# Patient Record
Sex: FEMALE | Race: WHITE | NOT HISPANIC OR LATINO | Employment: OTHER | URBAN - METROPOLITAN AREA
[De-identification: names, ages, dates, MRNs, and addresses within clinical notes are randomized per-mention and may not be internally consistent; named-entity substitution may affect disease eponyms.]

---

## 2020-11-24 ENCOUNTER — OFFICE VISIT (OUTPATIENT)
Dept: FAMILY MEDICINE CLINIC | Facility: CLINIC | Age: 80
End: 2020-11-24
Payer: COMMERCIAL

## 2020-11-24 VITALS
HEART RATE: 55 BPM | RESPIRATION RATE: 16 BRPM | SYSTOLIC BLOOD PRESSURE: 152 MMHG | HEIGHT: 60 IN | WEIGHT: 101 LBS | OXYGEN SATURATION: 96 % | BODY MASS INDEX: 19.83 KG/M2 | DIASTOLIC BLOOD PRESSURE: 64 MMHG | TEMPERATURE: 97.5 F

## 2020-11-24 DIAGNOSIS — E78.5 HYPERLIPIDEMIA, UNSPECIFIED HYPERLIPIDEMIA TYPE: ICD-10-CM

## 2020-11-24 DIAGNOSIS — R53.83 FATIGUE, UNSPECIFIED TYPE: ICD-10-CM

## 2020-11-24 DIAGNOSIS — I25.119 CORONARY ARTERY DISEASE INVOLVING NATIVE CORONARY ARTERY OF NATIVE HEART WITH ANGINA PECTORIS (HCC): ICD-10-CM

## 2020-11-24 DIAGNOSIS — B37.9 CANDIDIASIS: ICD-10-CM

## 2020-11-24 DIAGNOSIS — R25.1 TREMOR: Primary | ICD-10-CM

## 2020-11-24 DIAGNOSIS — Z78.0 POSTMENOPAUSAL: ICD-10-CM

## 2020-11-24 DIAGNOSIS — I10 ESSENTIAL HYPERTENSION: ICD-10-CM

## 2020-11-24 DIAGNOSIS — I77.9 BILATERAL CAROTID ARTERY DISEASE, UNSPECIFIED TYPE (HCC): ICD-10-CM

## 2020-11-24 PROCEDURE — 1036F TOBACCO NON-USER: CPT | Performed by: INTERNAL MEDICINE

## 2020-11-24 PROCEDURE — 3078F DIAST BP <80 MM HG: CPT | Performed by: INTERNAL MEDICINE

## 2020-11-24 PROCEDURE — 99203 OFFICE O/P NEW LOW 30 MIN: CPT | Performed by: INTERNAL MEDICINE

## 2020-11-24 PROCEDURE — G0439 PPPS, SUBSEQ VISIT: HCPCS | Performed by: INTERNAL MEDICINE

## 2020-11-24 PROCEDURE — 1170F FXNL STATUS ASSESSED: CPT | Performed by: INTERNAL MEDICINE

## 2020-11-24 PROCEDURE — 1160F RVW MEDS BY RX/DR IN RCRD: CPT | Performed by: INTERNAL MEDICINE

## 2020-11-24 PROCEDURE — 3725F SCREEN DEPRESSION PERFORMED: CPT | Performed by: INTERNAL MEDICINE

## 2020-11-24 PROCEDURE — 1125F AMNT PAIN NOTED PAIN PRSNT: CPT | Performed by: INTERNAL MEDICINE

## 2020-11-24 PROCEDURE — 3077F SYST BP >= 140 MM HG: CPT | Performed by: INTERNAL MEDICINE

## 2020-11-24 RX ORDER — ATORVASTATIN CALCIUM 40 MG/1
40 TABLET, FILM COATED ORAL DAILY
Qty: 90 TABLET | Refills: 1 | Status: SHIPPED | OUTPATIENT
Start: 2020-11-24 | End: 2021-05-02

## 2020-11-24 RX ORDER — LOSARTAN POTASSIUM 50 MG/1
TABLET ORAL
COMMUNITY
Start: 2020-08-19 | End: 2020-11-24 | Stop reason: SDUPTHER

## 2020-11-24 RX ORDER — ATORVASTATIN CALCIUM 40 MG/1
40 TABLET, FILM COATED ORAL DAILY
COMMUNITY
End: 2020-11-24 | Stop reason: SDUPTHER

## 2020-11-24 RX ORDER — AMLODIPINE BESYLATE 5 MG/1
5 TABLET ORAL 2 TIMES DAILY
COMMUNITY
End: 2020-11-24 | Stop reason: SDUPTHER

## 2020-11-24 RX ORDER — PROPRANOLOL HYDROCHLORIDE 40 MG/1
40 TABLET ORAL 2 TIMES DAILY
Qty: 180 TABLET | Refills: 1 | Status: SHIPPED | OUTPATIENT
Start: 2020-11-24 | End: 2021-08-03

## 2020-11-24 RX ORDER — LORAZEPAM 0.5 MG/1
0.25 TABLET ORAL 2 TIMES DAILY
Qty: 10 TABLET | Refills: 1 | Status: SHIPPED | OUTPATIENT
Start: 2020-11-24 | End: 2022-01-19

## 2020-11-24 RX ORDER — AMLODIPINE BESYLATE 5 MG/1
5 TABLET ORAL 2 TIMES DAILY
Qty: 180 TABLET | Refills: 1 | Status: SHIPPED | OUTPATIENT
Start: 2020-11-24 | End: 2021-05-02

## 2020-11-24 RX ORDER — PROPRANOLOL HYDROCHLORIDE 40 MG/1
TABLET ORAL
COMMUNITY
Start: 2020-11-04 | End: 2020-11-24 | Stop reason: SDUPTHER

## 2020-11-24 RX ORDER — LOSARTAN POTASSIUM 50 MG/1
50 TABLET ORAL DAILY
Qty: 90 TABLET | Refills: 1 | Status: SHIPPED | OUTPATIENT
Start: 2020-11-24 | End: 2020-12-24 | Stop reason: SDUPTHER

## 2020-12-21 ENCOUNTER — LAB (OUTPATIENT)
Dept: LAB | Facility: MEDICAL CENTER | Age: 80
End: 2020-12-21
Payer: COMMERCIAL

## 2020-12-21 ENCOUNTER — HOSPITAL ENCOUNTER (OUTPATIENT)
Dept: RADIOLOGY | Facility: MEDICAL CENTER | Age: 80
Discharge: HOME | End: 2020-12-21
Payer: COMMERCIAL

## 2020-12-21 DIAGNOSIS — E78.5 HYPERLIPIDEMIA, UNSPECIFIED HYPERLIPIDEMIA TYPE: ICD-10-CM

## 2020-12-21 DIAGNOSIS — Z78.0 POSTMENOPAUSAL: ICD-10-CM

## 2020-12-21 DIAGNOSIS — R53.83 FATIGUE, UNSPECIFIED TYPE: ICD-10-CM

## 2020-12-21 DIAGNOSIS — Z12.31 VISIT FOR SCREENING MAMMOGRAM: ICD-10-CM

## 2020-12-21 DIAGNOSIS — I10 ESSENTIAL HYPERTENSION: ICD-10-CM

## 2020-12-21 LAB
ALBUMIN SERPL BCP-MCNC: 4 G/DL (ref 3.5–5)
ALP SERPL-CCNC: 81 U/L (ref 46–116)
ALT SERPL W P-5'-P-CCNC: 34 U/L (ref 12–78)
ANION GAP SERPL CALCULATED.3IONS-SCNC: 6 MMOL/L (ref 4–13)
AST SERPL W P-5'-P-CCNC: 34 U/L (ref 5–45)
BILIRUB SERPL-MCNC: 0.52 MG/DL (ref 0.2–1)
BUN SERPL-MCNC: 17 MG/DL (ref 5–25)
CALCIUM SERPL-MCNC: 10.2 MG/DL (ref 8.3–10.1)
CHLORIDE SERPL-SCNC: 102 MMOL/L (ref 100–108)
CHOLEST SERPL-MCNC: 172 MG/DL (ref 50–200)
CO2 SERPL-SCNC: 31 MMOL/L (ref 21–32)
CREAT SERPL-MCNC: 1.13 MG/DL (ref 0.6–1.3)
ERYTHROCYTE [DISTWIDTH] IN BLOOD BY AUTOMATED COUNT: 13.4 % (ref 11.6–15.1)
GFR SERPL CREATININE-BSD FRML MDRD: 46 ML/MIN/1.73SQ M
GLUCOSE P FAST SERPL-MCNC: 90 MG/DL (ref 65–99)
HCT VFR BLD AUTO: 44.5 % (ref 34.8–46.1)
HDLC SERPL-MCNC: 48 MG/DL
HGB BLD-MCNC: 14 G/DL (ref 11.5–15.4)
LDLC SERPL CALC-MCNC: 94 MG/DL (ref 0–100)
MCH RBC QN AUTO: 30.8 PG (ref 26.8–34.3)
MCHC RBC AUTO-ENTMCNC: 31.5 G/DL (ref 31.4–37.4)
MCV RBC AUTO: 98 FL (ref 82–98)
NONHDLC SERPL-MCNC: 124 MG/DL
PLATELET # BLD AUTO: 216 THOUSANDS/UL (ref 149–390)
PMV BLD AUTO: 9.8 FL (ref 8.9–12.7)
POTASSIUM SERPL-SCNC: 4 MMOL/L (ref 3.5–5.3)
PROT SERPL-MCNC: 7.6 G/DL (ref 6.4–8.2)
RBC # BLD AUTO: 4.55 MILLION/UL (ref 3.81–5.12)
SODIUM SERPL-SCNC: 139 MMOL/L (ref 136–145)
TRIGL SERPL-MCNC: 149 MG/DL
TSH SERPL DL<=0.05 MIU/L-ACNC: 0.87 UIU/ML (ref 0.36–3.74)
WBC # BLD AUTO: 4.05 THOUSAND/UL (ref 4.31–10.16)

## 2020-12-21 PROCEDURE — 85027 COMPLETE CBC AUTOMATED: CPT

## 2020-12-21 PROCEDURE — 77080 DXA BONE DENSITY AXIAL: CPT

## 2020-12-21 PROCEDURE — 80061 LIPID PANEL: CPT

## 2020-12-21 PROCEDURE — 36415 COLL VENOUS BLD VENIPUNCTURE: CPT

## 2020-12-21 PROCEDURE — 84443 ASSAY THYROID STIM HORMONE: CPT

## 2020-12-21 PROCEDURE — 80053 COMPREHEN METABOLIC PANEL: CPT

## 2020-12-24 ENCOUNTER — OFFICE VISIT (OUTPATIENT)
Dept: FAMILY MEDICINE CLINIC | Facility: CLINIC | Age: 80
End: 2020-12-24
Payer: COMMERCIAL

## 2020-12-24 VITALS
HEIGHT: 60 IN | SYSTOLIC BLOOD PRESSURE: 148 MMHG | HEART RATE: 66 BPM | OXYGEN SATURATION: 96 % | BODY MASS INDEX: 19.44 KG/M2 | DIASTOLIC BLOOD PRESSURE: 64 MMHG | WEIGHT: 99 LBS | RESPIRATION RATE: 16 BRPM | TEMPERATURE: 97.3 F

## 2020-12-24 DIAGNOSIS — I10 ESSENTIAL HYPERTENSION: Primary | ICD-10-CM

## 2020-12-24 DIAGNOSIS — K12.1 MOUTH ULCER: ICD-10-CM

## 2020-12-24 DIAGNOSIS — M81.0 OSTEOPOROSIS, UNSPECIFIED OSTEOPOROSIS TYPE, UNSPECIFIED PATHOLOGICAL FRACTURE PRESENCE: ICD-10-CM

## 2020-12-24 DIAGNOSIS — E78.5 DYSLIPIDEMIA: ICD-10-CM

## 2020-12-24 PROCEDURE — 99214 OFFICE O/P EST MOD 30 MIN: CPT | Performed by: INTERNAL MEDICINE

## 2020-12-24 PROCEDURE — 1036F TOBACCO NON-USER: CPT | Performed by: INTERNAL MEDICINE

## 2020-12-24 PROCEDURE — 3077F SYST BP >= 140 MM HG: CPT | Performed by: INTERNAL MEDICINE

## 2020-12-24 PROCEDURE — 3725F SCREEN DEPRESSION PERFORMED: CPT | Performed by: INTERNAL MEDICINE

## 2020-12-24 PROCEDURE — 3078F DIAST BP <80 MM HG: CPT | Performed by: INTERNAL MEDICINE

## 2020-12-24 PROCEDURE — 1160F RVW MEDS BY RX/DR IN RCRD: CPT | Performed by: INTERNAL MEDICINE

## 2020-12-24 RX ORDER — EZETIMIBE 10 MG/1
10 TABLET ORAL DAILY
Qty: 90 TABLET | Refills: 1 | Status: SHIPPED | OUTPATIENT
Start: 2020-12-24 | End: 2021-05-10 | Stop reason: SDUPTHER

## 2020-12-24 RX ORDER — AMOXICILLIN 500 MG/1
500 CAPSULE ORAL EVERY 8 HOURS SCHEDULED
Qty: 30 CAPSULE | Refills: 0 | Status: SHIPPED | OUTPATIENT
Start: 2020-12-24 | End: 2021-01-03

## 2020-12-24 RX ORDER — LOSARTAN POTASSIUM 100 MG/1
100 TABLET ORAL DAILY
Qty: 90 TABLET | Refills: 1 | Status: SHIPPED | OUTPATIENT
Start: 2020-12-24 | End: 2021-08-10 | Stop reason: ALTCHOICE

## 2021-01-20 ENCOUNTER — LAB (OUTPATIENT)
Dept: LAB | Facility: MEDICAL CENTER | Age: 81
End: 2021-01-20
Payer: COMMERCIAL

## 2021-01-20 DIAGNOSIS — I10 ESSENTIAL HYPERTENSION: ICD-10-CM

## 2021-01-20 DIAGNOSIS — E78.5 DYSLIPIDEMIA: ICD-10-CM

## 2021-01-20 LAB
ALBUMIN SERPL BCP-MCNC: 4.1 G/DL (ref 3.5–5)
ALP SERPL-CCNC: 85 U/L (ref 46–116)
ALT SERPL W P-5'-P-CCNC: 23 U/L (ref 12–78)
ANION GAP SERPL CALCULATED.3IONS-SCNC: 0 MMOL/L (ref 4–13)
AST SERPL W P-5'-P-CCNC: 26 U/L (ref 5–45)
BILIRUB SERPL-MCNC: 0.51 MG/DL (ref 0.2–1)
BUN SERPL-MCNC: 19 MG/DL (ref 5–25)
CALCIUM SERPL-MCNC: 10.5 MG/DL (ref 8.3–10.1)
CHLORIDE SERPL-SCNC: 105 MMOL/L (ref 100–108)
CHOLEST SERPL-MCNC: 144 MG/DL (ref 50–200)
CO2 SERPL-SCNC: 33 MMOL/L (ref 21–32)
CREAT SERPL-MCNC: 1.25 MG/DL (ref 0.6–1.3)
GFR SERPL CREATININE-BSD FRML MDRD: 41 ML/MIN/1.73SQ M
GLUCOSE P FAST SERPL-MCNC: 88 MG/DL (ref 65–99)
HDLC SERPL-MCNC: 60 MG/DL
LDLC SERPL CALC-MCNC: 63 MG/DL (ref 0–100)
NONHDLC SERPL-MCNC: 84 MG/DL
POTASSIUM SERPL-SCNC: 4.2 MMOL/L (ref 3.5–5.3)
PROT SERPL-MCNC: 7.8 G/DL (ref 6.4–8.2)
SODIUM SERPL-SCNC: 138 MMOL/L (ref 136–145)
TRIGL SERPL-MCNC: 103 MG/DL

## 2021-01-20 PROCEDURE — 80053 COMPREHEN METABOLIC PANEL: CPT

## 2021-01-20 PROCEDURE — 80061 LIPID PANEL: CPT

## 2021-01-20 PROCEDURE — 36415 COLL VENOUS BLD VENIPUNCTURE: CPT

## 2021-01-25 ENCOUNTER — OFFICE VISIT (OUTPATIENT)
Dept: FAMILY MEDICINE CLINIC | Facility: CLINIC | Age: 81
End: 2021-01-25
Payer: COMMERCIAL

## 2021-01-25 VITALS
HEART RATE: 73 BPM | DIASTOLIC BLOOD PRESSURE: 68 MMHG | OXYGEN SATURATION: 99 % | WEIGHT: 100 LBS | BODY MASS INDEX: 19.63 KG/M2 | RESPIRATION RATE: 16 BRPM | HEIGHT: 60 IN | TEMPERATURE: 97.1 F | SYSTOLIC BLOOD PRESSURE: 140 MMHG

## 2021-01-25 DIAGNOSIS — I10 HYPERTENSION, UNSPECIFIED TYPE: ICD-10-CM

## 2021-01-25 DIAGNOSIS — I25.119 CORONARY ARTERY DISEASE INVOLVING NATIVE CORONARY ARTERY OF NATIVE HEART WITH ANGINA PECTORIS (HCC): Primary | ICD-10-CM

## 2021-01-25 DIAGNOSIS — I77.9 BILATERAL CAROTID ARTERY DISEASE, UNSPECIFIED TYPE (HCC): ICD-10-CM

## 2021-01-25 DIAGNOSIS — E78.5 DYSLIPIDEMIA: ICD-10-CM

## 2021-01-25 PROCEDURE — 1160F RVW MEDS BY RX/DR IN RCRD: CPT | Performed by: INTERNAL MEDICINE

## 2021-01-25 PROCEDURE — 3078F DIAST BP <80 MM HG: CPT | Performed by: INTERNAL MEDICINE

## 2021-01-25 PROCEDURE — 3077F SYST BP >= 140 MM HG: CPT | Performed by: INTERNAL MEDICINE

## 2021-01-25 PROCEDURE — 99213 OFFICE O/P EST LOW 20 MIN: CPT | Performed by: INTERNAL MEDICINE

## 2021-01-25 PROCEDURE — 1036F TOBACCO NON-USER: CPT | Performed by: INTERNAL MEDICINE

## 2021-01-25 RX ORDER — NITROGLYCERIN 0.4 MG/1
0.4 TABLET SUBLINGUAL
Qty: 25 TABLET | Refills: 1 | Status: SHIPPED | OUTPATIENT
Start: 2021-01-25 | End: 2022-02-08 | Stop reason: SDUPTHER

## 2021-01-25 NOTE — PROGRESS NOTES
Falls Plan of Care: balance, strength, and gait training instructions were provided  Denies fall      Assessment/Plan:         Diagnoses and all orders for this visit:    Coronary artery disease involving native coronary artery of native heart with angina pectoris ([place] [address] )  Comments:  stable  Orders:  -     nitroglycerin (NITROSTAT) 0 4 mg SL tablet; Place 1 tablet (0 4 mg total) under the tongue every 5 (five) minutes as needed for chest pain Nitrostat brand necessary    Hypertension, unspecified type  Comments:  continue same    Dyslipidemia  Comments:  on statin and zetia    Bilateral carotid artery disease, unspecified type ([place] [address] )  Comments:  denies amarousa fugax          Subjective:      Patient ID: Brit Blackwell is a [de-identified] y o  female  Denies cp/sob/h/a  Has not seen card lately  Will rx ntg   rto for f/u bp  Denies amarousa fugax      The following portions of the patient's history were reviewed and updated as appropriate: She  has a past medical history of Dyslipidemia, Hypertension, and Tremor  She   Patient Active Problem List    Diagnosis Date Noted    Bilateral carotid artery disease ([place] [address] ) 01/25/2021    Hypertension     Dyslipidemia     Coronary artery disease involving native coronary artery of native heart with angina pectoris ([place] [address] ) 11/24/2020     She  has a past surgical history that includes Cardiac surgery; Carotid angioplasty; and Closed manipulation shoulder  Her family history includes Coronary artery disease in her mother  She  reports that she has never smoked  She has never used smokeless tobacco  She reports that she does not drink alcohol or use drugs    Current Outpatient Medications   Medication Sig Dispense Refill    amLODIPine (NORVASC) 5 mg tablet Take 1 tablet (5 mg total) by mouth 2 (two) times a day 180 tablet 1    atorvastatin (LIPITOR) 40 mg tablet Take 1 tablet (40 mg total) by mouth daily 90 tablet 1    ezetimibe (ZETIA) 10 mg tablet Take 1 tablet (10 mg total) by mouth daily 90 tablet 1    LORazepam (ATIVAN) 0 5 mg tablet Take 0 5 tablets (0 25 mg total) by mouth 2 (two) times a day 10 tablet 1    losartan (COZAAR) 100 MG tablet Take 1 tablet (100 mg total) by mouth daily 90 tablet 1    propranolol (INDERAL) 40 mg tablet Take 1 tablet (40 mg total) by mouth 2 (two) times a day 180 tablet 1    nitroglycerin (NITROSTAT) 0 4 mg SL tablet Place 1 tablet (0 4 mg total) under the tongue every 5 (five) minutes as needed for chest pain Nitrostat brand necessary 25 tablet 1     No current facility-administered medications for this visit  Current Outpatient Medications on File Prior to Visit   Medication Sig    amLODIPine (NORVASC) 5 mg tablet Take 1 tablet (5 mg total) by mouth 2 (two) times a day    atorvastatin (LIPITOR) 40 mg tablet Take 1 tablet (40 mg total) by mouth daily    ezetimibe (ZETIA) 10 mg tablet Take 1 tablet (10 mg total) by mouth daily    LORazepam (ATIVAN) 0 5 mg tablet Take 0 5 tablets (0 25 mg total) by mouth 2 (two) times a day    losartan (COZAAR) 100 MG tablet Take 1 tablet (100 mg total) by mouth daily    propranolol (INDERAL) 40 mg tablet Take 1 tablet (40 mg total) by mouth 2 (two) times a day     No current facility-administered medications on file prior to visit  She has No Known Allergies       Review of Systems   Constitutional: Negative  HENT: Negative  Respiratory: Negative  Negative for shortness of breath  Cardiovascular: Negative  Negative for chest pain  Neurological: Negative  Negative for headaches  Objective:      /68 (BP Location: Left arm, Patient Position: Sitting, Cuff Size: Standard)   Pulse 73   Temp (!) 97 1 °F (36 2 °C) (Temporal)   Resp 16   Ht 4' 11 75" (1 518 m)   Wt 45 4 kg (100 lb)   SpO2 99%   BMI 19 69 kg/m²          Physical Exam  HENT:      Head: Normocephalic and atraumatic        Right Ear: Tympanic membrane and ear canal normal       Left Ear: Tympanic membrane and ear canal normal    Neck:      Musculoskeletal: Neck supple  Cardiovascular:      Rate and Rhythm: Normal rate and regular rhythm  Pulmonary:      Effort: Pulmonary effort is normal       Breath sounds: Normal breath sounds  Neurological:      Mental Status: She is alert

## 2021-03-06 ENCOUNTER — IMMUNIZATIONS (OUTPATIENT)
Dept: FAMILY MEDICINE CLINIC | Facility: HOSPITAL | Age: 81
End: 2021-03-06

## 2021-03-06 DIAGNOSIS — Z23 ENCOUNTER FOR IMMUNIZATION: Primary | ICD-10-CM

## 2021-03-06 PROCEDURE — 91300 SARS-COV-2 / COVID-19 MRNA VACCINE (PFIZER-BIONTECH) 30 MCG: CPT

## 2021-03-06 PROCEDURE — 0001A SARS-COV-2 / COVID-19 MRNA VACCINE (PFIZER-BIONTECH) 30 MCG: CPT

## 2021-03-25 ENCOUNTER — IMMUNIZATIONS (OUTPATIENT)
Dept: FAMILY MEDICINE CLINIC | Facility: HOSPITAL | Age: 81
End: 2021-03-25

## 2021-03-25 DIAGNOSIS — Z23 ENCOUNTER FOR IMMUNIZATION: Primary | ICD-10-CM

## 2021-03-25 PROCEDURE — 91300 SARS-COV-2 / COVID-19 MRNA VACCINE (PFIZER-BIONTECH) 30 MCG: CPT

## 2021-03-25 PROCEDURE — 0002A SARS-COV-2 / COVID-19 MRNA VACCINE (PFIZER-BIONTECH) 30 MCG: CPT

## 2021-03-29 ENCOUNTER — TRANSCRIBE ORDERS (OUTPATIENT)
Dept: NEUROLOGY | Facility: CLINIC | Age: 81
End: 2021-03-29

## 2021-04-01 ENCOUNTER — CONSULT (OUTPATIENT)
Dept: NEUROLOGY | Facility: CLINIC | Age: 81
End: 2021-04-01
Payer: COMMERCIAL

## 2021-04-01 VITALS
BODY MASS INDEX: 20.03 KG/M2 | HEIGHT: 60 IN | DIASTOLIC BLOOD PRESSURE: 60 MMHG | WEIGHT: 102 LBS | HEART RATE: 57 BPM | SYSTOLIC BLOOD PRESSURE: 120 MMHG

## 2021-04-01 DIAGNOSIS — R25.1 TREMOR: ICD-10-CM

## 2021-04-01 DIAGNOSIS — I65.23 BILATERAL CAROTID ARTERY STENOSIS: ICD-10-CM

## 2021-04-01 DIAGNOSIS — G25.0 TREMOR, ESSENTIAL: Primary | ICD-10-CM

## 2021-04-01 PROCEDURE — 99204 OFFICE O/P NEW MOD 45 MIN: CPT | Performed by: PSYCHIATRY & NEUROLOGY

## 2021-04-01 PROCEDURE — 1036F TOBACCO NON-USER: CPT | Performed by: PSYCHIATRY & NEUROLOGY

## 2021-04-01 RX ORDER — PRIMIDONE 50 MG/1
25 TABLET ORAL
Qty: 30 TABLET | Refills: 1 | Status: SHIPPED | OUTPATIENT
Start: 2021-04-01 | End: 2021-05-17 | Stop reason: SDUPTHER

## 2021-04-01 RX ORDER — ASPIRIN 325 MG
325 TABLET ORAL DAILY
COMMUNITY

## 2021-04-01 NOTE — PATIENT INSTRUCTIONS
Consider buying weighted utensils, available on [facility] primidone 25 mg at bedtime   Carotid US of bilateral carotids  Follow up in 6 months

## 2021-04-01 NOTE — ASSESSMENT & PLAN NOTE
Patient is an 25-year-old female with past medical history of vascular risk factors who presents with 1 year history of right hand tremors  Tremor is worse with intention, not present at rest   Tremor previously treated with propranolol 40 mg bid which offered some benefit initially however later became refractory to his medication  Physical exam is significant for a low-amplitude high-frequency tremor that is worse with intention  No presence of head bobbing or left hand involvement  Plan:  · Patient's history and physical exam is consistent with an essential tremor  · Will start patient on primidone 25 mg q h s   As this is long-acting showed provide benefit throughout today  · Will not discontinue propanolol now given her BP control, today stable 120/60  · Stew Fajardo will reach out to PCP about propanolol in case they would be comfortable discontinuing, not needed for tremor  · Discussed getting a MRI of the brain to evaluate for more central causes of her tremor however patient opted defer this option for the future  · Suggested patient to look into buying weighted utensils to assist with her eating, available on [address] [facility] [address] or at local pharmacy  · Will have patient follow up with me in 6 months

## 2021-04-01 NOTE — PROGRESS NOTES
Patient ID: Lorri Cuellar is a [de-identified] y o  female  Assessment/Plan:    Tremor, essential  Patient is an 71-year-old female with past medical history of vascular risk factors who presents with 1 year history of right hand tremors  Tremor is worse with intention, not present at rest   Tremor previously treated with propranolol 40 mg bid which offered some benefit initially however later became refractory to his medication  Physical exam is significant for a low-amplitude high-frequency tremor that is worse with intention  No presence of head bobbing or left hand involvement  Plan:  · Patient's history and physical exam is consistent with an essential tremor  · Will start patient on primidone 25 mg q h s  As this is long-acting showed provide benefit throughout today  · Will not discontinue propanolol now given her BP control, today stable 120/60  · Nicki Mayen will reach out to PCP about propanolol in case they would be comfortable discontinuing, not needed for tremor  · Discussed getting a MRI of the brain to evaluate for more central causes of her tremor however patient opted defer this option for the future  · Suggested patient to look into buying weighted utensils to assist with her eating, available on [employer] or at local pharmacy  · Will have patient follow up with me in 6 months    Bilateral carotid artery disease ([facility] [address] )  Patient with a history of bilateral carotid artery disease status post bilateral endarterectomy  Denies having any history of stroke  At home takes aspirin 325 daily as well as Lipitor 40 mg daily  Plan:  · Will order carotid ultrasound to evaluate progression  · Continue aspirin and statin  · Follow-up with results in 6 months       Diagnoses and all orders for this visit:    Tremor, essential  -     primidone (MYSOLINE) 50 mg tablet; Take 0 5 tablets (25 mg total) by mouth daily at bedtime    Tremor  Comments:  referr to neuro    her hr is  50's  Orders:  -     Ambulatory referral to Neurology  -     primidone (MYSOLINE) 50 mg tablet; Take 0 5 tablets (25 mg total) by mouth daily at bedtime    Bilateral carotid artery stenosis  -     VAS carotid complete study; Future    Other orders  -     aspirin 325 mg tablet; Take 325 mg by mouth daily         Subjective:    HPI     Patient is an 26-year-old right-handed female  with past medical history of CAD s/p quadruple bypass, HTN on Norvasc, carotid stenosis s/p bilateral endarterectomy (no hx of stroke) and  hyperlipidemia who presents accompanied by her sister for evaluation of right hand tremor  Tremor onset was approximately 1 year after patient suffered a fall and dislocated her right shoulder  Since then she has been having a worsening low amplitude high frequency tremor of the right hand that is worse with intention  Not present at rest   Left hand as well as head are spared  This has caused patient to have trouble with fine motor movement and doing things such as writing, eating and holding a cup of coffee (Patient only drinks a cup of decaffeinated coffee a day)  Patient cannot voluntarily control tremor  Patient recently moved from [facility]  Her physicians there started her on propranolol 40 mg b i d  She states that at the beginning she noticed a difference however it stopped working  On chart review I noticed that she was also prescribed Ativan 0 5 mg  She states that she took half of a little yellow pill however she does not recall if this helped with her tremors  patient states that about a year ago she had a change in medication however she does not recall which one  Prior to tremor starting Wendi Cornell tripped over something and dislocated her right shoulder  She did not require surgery, this was just readjusted and treated with a sling and medications  s   The only supplements patient takes his own may go, Co Q10, vitamin-D and calcium       On review of systems, patient endorses an unintentional weight loss accompanied by loss of appetite  In [facility] she used to weigh 110-115 lb  Today she weighs 103 and in November she used to weigh 101  She denies getting lost in familiar places, hallucinations, any problems sleeping or problems walking  She is able to do all her ADLs including cooking, bathing, fastening buttons and cleaning  Patient lives with her sister  Patient does not drive  Patient was never a smoker or drinker  She used to work at a [employer] factory, sewing  She denies being exposed to any strange chemical   Denies any family history of movement disorder tremors  Family history only significant for cardiac conditions and cancer  The following portions of the patient's history were reviewed and updated as appropriate: allergies, current medications, past family history, past medical history, past social history, past surgical history and problem list and ros  Objective:    Blood pressure 120/60, pulse 57, height 5' (1 524 m), weight 46 3 kg (102 lb)  Physical Exam  Eyes:      General: Lids are normal       Extraocular Movements: Extraocular movements intact  Neurological:      Deep Tendon Reflexes: Strength normal       Reflex Scores:       Bicep reflexes are 2+ on the right side and 2+ on the left side  Brachioradialis reflexes are 2+ on the right side and 2+ on the left side  Patellar reflexes are 2+ on the right side and 2+ on the left side  Achilles reflexes are 0 on the right side and 0 on the left side  Neurological Exam  Mental Status  Awake, alert and oriented to person, place and time  Cranial Nerves  CN II: Visual fields full to confrontation  CN III, IV, VI: Extraocular movements intact bilaterally  Normal lids and orbits bilaterally  CN VII: Full and symmetric facial movement  CN VIII: Hearing is normal   CN XI: Shoulder shrug strength is normal     Motor  Decreased muscle bulk throughout  Normal muscle tone   Strength is 5/5 throughout all four extremities  No asymmetric weakness of the upper extremities noted  Sensory  Light touch is normal in upper and lower extremities  Temperature is normal in upper and lower extremities  Vibration is normal in upper and lower extremities  Reflexes                                           Right                      Left  Brachioradialis                    2+                         2+  Biceps                                 2+                         2+  Patellar                                2+                         2+  Achilles                                0                         0    Right pathological reflexes: Crossed adductor absent  Left pathological reflexes: Crossed adductor absent  Coordination  Tremor worse with intention and increasing amplitude upon reaching target with right hand  No tremor on the left hand  Normal heel-to-shin bilaterally  Normal rapid alternating movements  Finger tap not rhythmic, no decrement noted  Gait  Stooped posture   Short strides  ROS:    Review of Systems   Constitutional: Negative  Negative for appetite change and fever  HENT: Negative  Negative for hearing loss, tinnitus, trouble swallowing and voice change  Eyes: Negative  Negative for photophobia and pain  Respiratory: Negative  Negative for shortness of breath  Cardiovascular: Negative  Negative for palpitations  Gastrointestinal: Negative  Negative for nausea and vomiting  Endocrine: Negative  Negative for cold intolerance  Genitourinary: Negative  Negative for dysuria, frequency and urgency  Musculoskeletal: Negative  Negative for myalgias and neck pain  Skin: Negative  Negative for rash  Neurological: Positive for tremors  Negative for dizziness, seizures, syncope, facial asymmetry, speech difficulty, weakness, light-headedness, numbness and headaches  Patient stated that she has right hand tremors  Hematological: Negative    Does not bruise/bleed easily  Psychiatric/Behavioral: Negative  Negative for confusion, hallucinations and sleep disturbance

## 2021-04-01 NOTE — ASSESSMENT & PLAN NOTE
Patient with a history of bilateral carotid artery disease status post bilateral endarterectomy  Denies having any history of stroke  At home takes aspirin 325 daily as well as Lipitor 40 mg daily      Plan:  · Will order carotid ultrasound to evaluate progression  · Continue aspirin and statin  · Follow-up with results in 6 months

## 2021-04-06 ENCOUNTER — TELEPHONE (OUTPATIENT)
Dept: FAMILY MEDICINE CLINIC | Facility: CLINIC | Age: 81
End: 2021-04-06

## 2021-04-06 NOTE — TELEPHONE ENCOUNTER
Patient was told by neuro that she needed to slowly stop the propranolol since she is now taking primidone, but was told to contact pcp to ask how to start weaning off medication?

## 2021-04-20 ENCOUNTER — HOSPITAL ENCOUNTER (OUTPATIENT)
Dept: RADIOLOGY | Facility: MEDICAL CENTER | Age: 81
Discharge: HOME | End: 2021-04-20
Payer: COMMERCIAL

## 2021-04-20 DIAGNOSIS — I65.23 BILATERAL CAROTID ARTERY STENOSIS: ICD-10-CM

## 2021-04-20 PROCEDURE — 93880 EXTRACRANIAL BILAT STUDY: CPT

## 2021-04-20 PROCEDURE — 93880 EXTRACRANIAL BILAT STUDY: CPT | Performed by: SURGERY

## 2021-04-21 ENCOUNTER — TELEPHONE (OUTPATIENT)
Dept: NEUROLOGY | Facility: CLINIC | Age: 81
End: 2021-04-21

## 2021-04-21 NOTE — TELEPHONE ENCOUNTER
Called patient with results of the VAS which showed a 50-69% stenosis noted in the R ICA with an heterogenous and irregular plaque  Due to patient being asymptomatic I advised her to continue taking her aspirin and her statin  Last lipid panel 1/20/21 shows good management of cholesterol  In regards to her tremor she reports not being quite as shaky as before  Will continue her on current dose  She has follow up appointment with me on 10/07/21

## 2021-04-30 DIAGNOSIS — I10 ESSENTIAL HYPERTENSION: ICD-10-CM

## 2021-04-30 DIAGNOSIS — E78.5 HYPERLIPIDEMIA, UNSPECIFIED HYPERLIPIDEMIA TYPE: ICD-10-CM

## 2021-05-02 RX ORDER — AMLODIPINE BESYLATE 5 MG/1
TABLET ORAL
Qty: 180 TABLET | Refills: 1 | Status: SHIPPED | OUTPATIENT
Start: 2021-05-02 | End: 2021-10-25 | Stop reason: SDUPTHER

## 2021-05-02 RX ORDER — ATORVASTATIN CALCIUM 40 MG/1
TABLET, FILM COATED ORAL
Qty: 90 TABLET | Refills: 1 | Status: SHIPPED | OUTPATIENT
Start: 2021-05-02 | End: 2021-10-25 | Stop reason: SDUPTHER

## 2021-05-02 RX ORDER — LOSARTAN POTASSIUM 50 MG/1
TABLET ORAL
Qty: 90 TABLET | Refills: 1 | Status: SHIPPED | OUTPATIENT
Start: 2021-05-02 | End: 2021-08-03

## 2021-05-03 ENCOUNTER — TELEPHONE (OUTPATIENT)
Dept: NEUROLOGY | Facility: CLINIC | Age: 81
End: 2021-05-03

## 2021-05-03 NOTE — TELEPHONE ENCOUNTER
Patient's sister Desiree Horton calling in  She states patient recently saw Dr Fern Infante who changed her medications for tremors  Patient previously on propranolol and was placed on primidone 50 mg  She states the medication was effective the first few days of taking the medication but her tremors have now returned as they were before  She confirmed patient is taking 50 mg nightly  Seeking further recommendations       Please advise    cb#: [phone], okay to leave detailed message

## 2021-05-05 NOTE — TELEPHONE ENCOUNTER
Ann Baldwin, DO  You 1 hour ago (8:34 AM)     Can increase primidone to half tab AM and continue with 50 mg nightly x 1 week if no sleepiness with morning dose can increase to 50 mg AM and PM thanks    Message text

## 2021-05-05 NOTE — TELEPHONE ENCOUNTER
Called and let Shital Porter know about patient's primidone prescription  Advised patient can increase to 50mg at night with 25mg qam x 1week and if no side effects (daytime sleepiness) can increase to 50mg BID

## 2021-05-10 DIAGNOSIS — I10 ESSENTIAL HYPERTENSION: ICD-10-CM

## 2021-05-11 RX ORDER — EZETIMIBE 10 MG/1
10 TABLET ORAL DAILY
Qty: 90 TABLET | Refills: 1 | Status: SHIPPED | OUTPATIENT
Start: 2021-05-11 | End: 2021-10-25 | Stop reason: SDUPTHER

## 2021-05-17 DIAGNOSIS — G25.0 TREMOR, ESSENTIAL: ICD-10-CM

## 2021-05-17 DIAGNOSIS — R25.1 TREMOR: ICD-10-CM

## 2021-05-17 NOTE — TELEPHONE ENCOUNTER
Patient's daughter returning call to office for refill    She confirms patient takes half tab in AM and full tab in PM    Please review order below and sign if agreeable

## 2021-05-17 NOTE — TELEPHONE ENCOUNTER
Received refill request for primidone from [address] [place] [address] daughter to confirm dose, continuous ring  Unable to leave message  Will attempt at later time

## 2021-05-18 RX ORDER — PRIMIDONE 50 MG/1
TABLET ORAL
Qty: 135 TABLET | Refills: 1 | Status: SHIPPED | OUTPATIENT
Start: 2021-05-18 | End: 2021-05-24 | Stop reason: SDUPTHER

## 2021-05-24 DIAGNOSIS — R25.1 TREMOR: ICD-10-CM

## 2021-05-24 DIAGNOSIS — G25.0 TREMOR, ESSENTIAL: ICD-10-CM

## 2021-05-24 RX ORDER — PRIMIDONE 50 MG/1
TABLET ORAL
Qty: 135 TABLET | Refills: 0 | Status: SHIPPED | OUTPATIENT
Start: 2021-05-24 | End: 2021-08-03 | Stop reason: SDUPTHER

## 2021-05-24 NOTE — TELEPHONE ENCOUNTER
Took call from [place], she states that they received primidone from [facility], but they lost the medication  They would like a one time 90 day rx sent to ihsan that they can use [facility] to obtain  Please sign script so they can get this, as patient will be out of medication    TY

## 2021-06-17 ENCOUNTER — TELEPHONE (OUTPATIENT)
Dept: FAMILY MEDICINE CLINIC | Facility: CLINIC | Age: 81
End: 2021-06-17

## 2021-06-17 NOTE — TELEPHONE ENCOUNTER
Pt's sister, David Martinez called to get name of pt's cardiologist   Pt is having cataract surgery & needs clearance from cardiology, but has not seen cardiology in some time  David Martinez is requesting cardiologists name & number  They only remember that it was an [address] [place] [address] provider

## 2021-07-06 ENCOUNTER — RA CDI HCC (OUTPATIENT)
Dept: OTHER | Facility: HOSPITAL | Age: 81
End: 2021-07-06

## 2021-07-06 NOTE — PROGRESS NOTES
[facility]  coding opportunities          Chart reviewed, no opportunity found: CHART REVIEWED, NO OPPORTUNITY FOUND      unsure on having enough clinical indication/rx to send for the [address] 119-cad w angina as unsure if having chest pain and is usually more for inpt care?  Not on any long-acting nitrates- and not seeing any usage of nitrostat--not sure is clinically appropriate for the outpt setting or currently LM               Patients insurance company: [employer] [address] [place] [address] ([employer])

## 2021-07-07 ENCOUNTER — OFFICE VISIT (OUTPATIENT)
Dept: CARDIOLOGY CLINIC | Facility: CLINIC | Age: 81
End: 2021-07-07
Payer: COMMERCIAL

## 2021-07-07 VITALS
BODY MASS INDEX: 19.04 KG/M2 | SYSTOLIC BLOOD PRESSURE: 130 MMHG | HEART RATE: 89 BPM | HEIGHT: 60 IN | OXYGEN SATURATION: 97 % | DIASTOLIC BLOOD PRESSURE: 60 MMHG | RESPIRATION RATE: 18 BRPM | TEMPERATURE: 97.8 F | WEIGHT: 97 LBS

## 2021-07-07 DIAGNOSIS — I65.23 BILATERAL CAROTID ARTERY STENOSIS: ICD-10-CM

## 2021-07-07 DIAGNOSIS — I25.119 CORONARY ARTERY DISEASE INVOLVING NATIVE CORONARY ARTERY OF NATIVE HEART WITH ANGINA PECTORIS (HCC): Primary | ICD-10-CM

## 2021-07-07 DIAGNOSIS — I10 HYPERTENSION, UNSPECIFIED TYPE: ICD-10-CM

## 2021-07-07 PROCEDURE — 3078F DIAST BP <80 MM HG: CPT | Performed by: INTERNAL MEDICINE

## 2021-07-07 PROCEDURE — 3075F SYST BP GE 130 - 139MM HG: CPT | Performed by: INTERNAL MEDICINE

## 2021-07-07 PROCEDURE — 1160F RVW MEDS BY RX/DR IN RCRD: CPT | Performed by: INTERNAL MEDICINE

## 2021-07-07 PROCEDURE — 93000 ELECTROCARDIOGRAM COMPLETE: CPT | Performed by: INTERNAL MEDICINE

## 2021-07-07 PROCEDURE — 1036F TOBACCO NON-USER: CPT | Performed by: INTERNAL MEDICINE

## 2021-07-07 PROCEDURE — 99214 OFFICE O/P EST MOD 30 MIN: CPT | Performed by: INTERNAL MEDICINE

## 2021-07-07 NOTE — PROGRESS NOTES
Consultation - Cardiology Office  [facility]  Charis ePng [de-identified] y o  female MRN: [id number]  : 1940  Unit/Bed#:  Encounter: [id number]      ASSESSMENT:  Perioperative cardiac risk assessment for bilateral cataract surgery    CAD, s/p CABG 2008 x 4    Hypertension    Dyslipidemia    History of bilateral carotid artery disease status post carotid revascularization probably with CEA      RECOMMENDATIONS:  Carotid ultrasound  Echocardiogram  Pharmacologic nuclear stress test            Thank you for your consultation  If you have any question please call me at [phone]      Primary Care Physician Requesting Consult: Arely Pham DO      Reason for Consult / Principal Problem:  Perioperative risk assessment        HPI :     Charis Peng is a [de-identified]y o  year old female who was referred by primary care doctor for evaluation of perioperative cardiac risk  Patient has a history of coronary artery disease and peripheral arterial disease  She had 4 vessel bypass in  and bilateral carotid artery revascularization 1 in  and 1 more recently  She has however had not had any vascular or cardiac follow-up and no recent testing that would show objective evaluation of her carotid arteries or her cardiac structure and coronary artery status  She does have a 2/6 systolic murmur and the amount of her activities restricted to walking across the street to her sister's house      REVIEW OF SYSTEMS:      Constitutional: Negative for activity change, appetite change, chills, fatigue, fever and unexpected weight change  HENT: Negative for congestion, sore throat and trouble swallowing  Eyes: Negative for discharge and redness  Respiratory: Negative for apnea, cough, chest tightness, shortness of breath and wheezing      Cardiovascular: Negative for chest pain, shortness of breath, palpitations and leg swelling  Gastrointestinal: Negative for abdominal distention, abdominal pain, anal bleeding, blood in stool, constipation, diarrhea, nausea and vomiting  Endocrine: Negative for polydipsia, polyphagia and polyuria  Genitourinary: Negative for difficulty urinating, dysuria, flank pain and hematuria  Musculoskeletal: Negative for arthralgias, myalgias and neck stiffness  Skin: Negative for pallor and rash  Allergic/Immunologic: Negative for environmental allergies  Neurological: Negative for dizziness, syncope, light-headedness, numbness and headaches  Hematological: Negative for adenopathy  Does not bruise/bleed easily  Psychiatric/Behavioral: Negative for confusion and hallucinations  The patient is not nervous/anxious        Historical Information   Past Medical History:   Diagnosis Date    Dyslipidemia     Hypertension     Tremor      Past Surgical History:   Procedure Laterality Date    CARDIAC SURGERY      CAROTID ARTERY ANGIOPLASTY      CLOSED MANIPULATION SHOULDER       Social History     Substance and Sexual Activity   Alcohol Use Never     Social History     Substance and Sexual Activity   Drug Use Never     Social History     Tobacco Use   Smoking Status Never Smoker   Smokeless Tobacco Never Used     Family History:   Family History   Problem Relation Age of Onset    Coronary artery disease Mother        Meds/Allergies     No Known Allergies    Current Outpatient Medications:     amLODIPine (NORVASC) 5 mg tablet, TAKE 1 TABLET BY MOUTH 2 TIMES DAILY, Disp: 180 tablet, Rfl: 1    aspirin 325 mg tablet, Take 325 mg by mouth daily, Disp: , Rfl:     atorvastatin (LIPITOR) 40 mg tablet, TAKE 1 TABLET BY MOUTH DAILY, Disp: 90 tablet, Rfl: 1    ezetimibe (ZETIA) 10 mg tablet, Take 1 tablet (10 mg total) by mouth daily, Disp: 90 tablet, Rfl: 1    LORazepam (ATIVAN) 0 5 mg tablet, Take 0 5 tablets (0 25 mg total) by mouth 2 (two) times a day, Disp: 10 tablet, Rfl: 1    losartan (COZAAR) 100 MG tablet, Take 1 tablet (100 mg total) by mouth daily, Disp: 90 tablet, Rfl: 1    losartan (COZAAR) 50 mg tablet, TAKE 1 TABLET BY MOUTH DAILY, Disp: 90 tablet, Rfl: 1    nitroglycerin (NITROSTAT) 0 4 mg SL tablet, Place 1 tablet (0 4 mg total) under the tongue every 5 (five) minutes as needed for chest pain Nitrostat brand necessary, Disp: 25 tablet, Rfl: 1    primidone (MYSOLINE) 50 mg tablet, Take half tab (25 mg) in AM and 1 full tab (50 mg) at bedtime, Disp: 135 tablet, Rfl: 0    propranolol (INDERAL) 40 mg tablet, Take 1 tablet (40 mg total) by mouth 2 (two) times a day (Patient not taking: Reported on 7/7/2021), Disp: 180 tablet, Rfl: 1    Vitals: Blood pressure 130/60, pulse 89, temperature 97 8 °F (36 6 °C), temperature source Temporal, resp  rate 18, height 5' (1 524 m), weight 44 kg (97 lb), SpO2 97 %  Body mass index is 18 94 kg/m²  Vitals:    07/07/21 0937   Weight: 44 kg (97 lb)     BP Readings from Last 3 Encounters:   07/07/21 130/60   04/01/21 120/60   01/25/21 140/68         PHYSICAL EXAMINATION:  Neurologic:  Alert & oriented x 3, no new focal deficits, Not in any acute distress,  Constitutional:  Well developed, well nourished, non-toxic appearance   Eyes: conjunctiva normal,   HENT:  Atraumatic, oropharynx moist, Neck- normal range of motion, no tenderness,  Neck supple   Respiratory:  Bilateral air entry, mostly clear to auscultation  Cardiovascular: S1-S2 regular with a I/VI systolic murmur   GI:  Soft, nondistended, normal bowel sounds, nontender, no hepatosplenomegaly appreciated  Musculoskeletal:  No edema, no tenderness, no deformities  Skin:  Well hydrated, no rash   Lymphatic:  No lymphadenopathy noted   Extremities:  No edema and distal pulses are present    Diagnostic Studies Review Cardio:      EKG:  Normal sinus rhythm, heart rate 73 per minute    Cardiac testing:   No results found for this or any previous visit  Imaging:  Chest X-Ray:   No Chest XR results available for this patient      CT-scan of the chest:     No CTA results available for this patient  Lab Review   Lab Results   Component Value Date    WBC 4 05 (L) 12/21/2020    HGB 14 0 12/21/2020    HCT 44 5 12/21/2020    MCV 98 12/21/2020    RDW 13 4 12/21/2020     12/21/2020     BMP:  Lab Results   Component Value Date    SODIUM 138 01/20/2021    K 4 2 01/20/2021     01/20/2021    CO2 33 (H) 01/20/2021    BUN 19 01/20/2021    CREATININE 1 25 01/20/2021    GLUF 88 01/20/2021    CALCIUM 10 5 (H) 01/20/2021    EGFR 41 01/20/2021     LFT:  Lab Results   Component Value Date    AST 26 01/20/2021    ALT 23 01/20/2021    ALKPHOS 85 01/20/2021    TP 7 8 01/20/2021    ALB 4 1 01/20/2021      Lab Results   Component Value Date    [id number] 0 874 12/21/2020     No components found for: TSH3  No results found for: HGBA1C  Lipid Profile:   Lab Results   Component Value Date    CHOLESTEROL 144 01/20/2021    HDL 60 01/20/2021    LDLCALC 63 01/20/2021    TRIG 103 01/20/2021     Lab Results   Component Value Date    CHOLESTEROL 144 01/20/2021    CHOLESTEROL 172 12/21/2020     No results found for: CKTOTAL, CKMB, CKMBINDEX, TROPONINI  No results found for: NTBNP   No results found for this or any previous visit (from the past 672 hour(s))  Dr Sophy Wilson MD, [facility]      "This note has been constructed using a voice recognition system  Therefore there may be syntax, spelling, and/or grammatical errors   Please call if you have any questions  "

## 2021-07-14 ENCOUNTER — HOSPITAL ENCOUNTER (OUTPATIENT)
Dept: RADIOLOGY | Facility: MEDICAL CENTER | Age: 81
Discharge: HOME | End: 2021-07-14
Payer: COMMERCIAL

## 2021-07-14 DIAGNOSIS — I65.23 BILATERAL CAROTID ARTERY STENOSIS: ICD-10-CM

## 2021-07-14 PROCEDURE — 93880 EXTRACRANIAL BILAT STUDY: CPT

## 2021-07-14 PROCEDURE — 93880 EXTRACRANIAL BILAT STUDY: CPT | Performed by: SURGERY

## 2021-07-15 ENCOUNTER — TELEPHONE (OUTPATIENT)
Dept: CARDIOLOGY CLINIC | Facility: CLINIC | Age: 81
End: 2021-07-15

## 2021-07-15 NOTE — TELEPHONE ENCOUNTER
----- Message from Ruth Green MD sent at 7/15/2021 12:59 PM EDT -----  Please call and inform patient  that the ultrasound of her neck shows  her left internal carotid artery is widely open  The right internal carotid artery has mild stenosis  The right external carotid artery has severe stenosis   No image it action needed   Will discuss further at next office visit

## 2021-07-16 NOTE — TELEPHONE ENCOUNTER
Spoke with patient's sister Denver Colander, message given per Dr Garlon Bare Denver Colander veralized understanding and will keep upcoming appointment

## 2021-07-19 ENCOUNTER — RA CDI HCC (OUTPATIENT)
Dept: OTHER | Facility: HOSPITAL | Age: 81
End: 2021-07-19

## 2021-07-19 NOTE — PROGRESS NOTES
[facility]  coding opportunities          Chart reviewed, no opportunity found: CHART REVIEWED, NO OPPORTUNITY FOUND                     Patients insurance company: [employer] (Medicare Advantage and Commercial)

## 2021-07-27 ENCOUNTER — RA CDI HCC (OUTPATIENT)
Dept: OTHER | Facility: HOSPITAL | Age: 81
End: 2021-07-27

## 2021-07-27 NOTE — PROGRESS NOTES
[facility]  coding opportunities          Chart reviewed, no opportunity found: CHART REVIEWED, NO OPPORTUNITY FOUND      noted bpa data-not enough clinical indication or rx to ask for malnutrition currenlty per outpt guidelines/policy-not seeing this dx in records and no rx               Patients insurance company: [employer] ([employer])

## 2021-07-30 NOTE — PROGRESS NOTES
Assessment & Plan:     I25 119 Coronary artery disease involving native coronary artery of native heart with angina pectoris ([facility] )  I have evaluated the patient and found the condition to be: Stable  I have evaluated the patient and: Recommended continue with same treatment plan    I77 9 Bilateral carotid artery disease ([facility] )  I have evaluated the patient and found the condition to be: Stable  I have evaluated the patient and: Recommended continue with same treatment plan    I10 Hypertension  I have evaluated the patient and found the condition to be: Stable  I have evaluated the patient and: Recommended continue with same treatment plan    E78 5 Dyslipidemia  I have evaluated the patient and found the condition to be: Stable  I have evaluated the patient and: Recommended continue with same treatment plan         Subjective:     Patient ID: Oh Monroe is a [de-identified] y o  female     Chief Complaint   Patient presents with    Enhanced visit    Follow-up      Denies amarousa fugax  Will see card for carotid results  Denies cp/sob      Review of Systems   Constitutional: Negative  HENT: Negative  Respiratory: Negative  Cardiovascular: Negative  Gastrointestinal: Negative  Objective:      /58 (BP Location: Left arm, Patient Position: Sitting, Cuff Size: Standard)   Pulse 68   Temp 98 °F (36 7 °C) (Temporal)   Resp 16   Ht 5' (1 524 m)   Wt 43 5 kg (96 lb)   SpO2 96%   BMI 18 75 kg/m²     Problem List Items Addressed This Visit        Nervous and Auditory    Tremor, essential    Relevant Medications    primidone (MYSOLINE) 50 mg tablet      Other Visit Diagnoses     Candidiasis    -  Primary    Relevant Medications    terconazole (TERAZOL 7) 0 4 % vaginal cream    Tremor        referr to neuro  her hr is  50's    Relevant Medications    primidone (MYSOLINE) 50 mg tablet          Physical Exam  Constitutional:       Appearance: Normal appearance     HENT:      Head: Normocephalic and atraumatic  Right Ear: Tympanic membrane and ear canal normal       Left Ear: Tympanic membrane and ear canal normal    Cardiovascular:      Rate and Rhythm: Normal rate and regular rhythm  Pulmonary:      Effort: Pulmonary effort is normal       Breath sounds: Normal breath sounds  Musculoskeletal:      Cervical back: Neck supple  Lymphadenopathy:      Cervical: No cervical adenopathy  Neurological:      Mental Status: She is alert

## 2021-08-03 ENCOUNTER — OFFICE VISIT (OUTPATIENT)
Dept: FAMILY MEDICINE CLINIC | Facility: CLINIC | Age: 81
End: 2021-08-03
Payer: COMMERCIAL

## 2021-08-03 VITALS
OXYGEN SATURATION: 96 % | HEART RATE: 68 BPM | RESPIRATION RATE: 16 BRPM | WEIGHT: 96 LBS | TEMPERATURE: 98 F | HEIGHT: 60 IN | BODY MASS INDEX: 18.85 KG/M2 | SYSTOLIC BLOOD PRESSURE: 136 MMHG | DIASTOLIC BLOOD PRESSURE: 58 MMHG

## 2021-08-03 DIAGNOSIS — I25.119 CORONARY ARTERY DISEASE INVOLVING NATIVE CORONARY ARTERY OF NATIVE HEART WITH ANGINA PECTORIS (HCC): ICD-10-CM

## 2021-08-03 DIAGNOSIS — I65.23 BILATERAL CAROTID ARTERY STENOSIS: ICD-10-CM

## 2021-08-03 DIAGNOSIS — G25.0 TREMOR, ESSENTIAL: ICD-10-CM

## 2021-08-03 DIAGNOSIS — E78.5 DYSLIPIDEMIA: ICD-10-CM

## 2021-08-03 DIAGNOSIS — I10 HYPERTENSION, UNSPECIFIED TYPE: ICD-10-CM

## 2021-08-03 DIAGNOSIS — B37.9 CANDIDIASIS: Primary | ICD-10-CM

## 2021-08-03 DIAGNOSIS — R25.1 TREMOR: ICD-10-CM

## 2021-08-03 PROCEDURE — 3075F SYST BP GE 130 - 139MM HG: CPT | Performed by: INTERNAL MEDICINE

## 2021-08-03 PROCEDURE — 3725F SCREEN DEPRESSION PERFORMED: CPT | Performed by: INTERNAL MEDICINE

## 2021-08-03 PROCEDURE — 99213 OFFICE O/P EST LOW 20 MIN: CPT | Performed by: INTERNAL MEDICINE

## 2021-08-03 PROCEDURE — 1101F PT FALLS ASSESS-DOCD LE1/YR: CPT | Performed by: INTERNAL MEDICINE

## 2021-08-03 PROCEDURE — 3078F DIAST BP <80 MM HG: CPT | Performed by: INTERNAL MEDICINE

## 2021-08-03 PROCEDURE — T1015 CLINIC SERVICE: HCPCS | Performed by: INTERNAL MEDICINE

## 2021-08-03 PROCEDURE — 3288F FALL RISK ASSESSMENT DOCD: CPT | Performed by: INTERNAL MEDICINE

## 2021-08-03 RX ORDER — PRIMIDONE 50 MG/1
TABLET ORAL
Qty: 135 TABLET | Refills: 0 | Status: SHIPPED | OUTPATIENT
Start: 2021-08-03 | End: 2021-11-18

## 2021-08-03 NOTE — PROGRESS NOTES
Assessment/Plan:         Diagnoses and all orders for this visit:    Candidiasis  -     terconazole (TERAZOL 7) 0 4 % vaginal cream; Insert 1 applicator into the vagina daily at bedtime    Tremor  Comments:  referr to neuro  her hr is  50's  Orders:  -     primidone (MYSOLINE) 50 mg tablet; Take half tab (25 mg) in AM and 1 full tab (50 mg) at bedtime    Tremor, essential  -     primidone (MYSOLINE) 50 mg tablet; Take half tab (25 mg) in AM and 1 full tab (50 mg) at bedtime    Coronary artery disease involving native coronary artery of native heart with angina pectoris (HCC)    Hypertension, unspecified type    Bilateral carotid artery stenosis    Dyslipidemia          Subjective:      Patient ID: Dea Otoole is a [de-identified] y o  female  Pt denies cp/sob/h/a or amarousa fugax  Needs some rx      The following portions of the patient's history were reviewed and updated as appropriate: She  has a past medical history of Dyslipidemia, Hypertension, and Tremor  She   Patient Active Problem List    Diagnosis Date Noted    Tremor, essential 04/01/2021    Bilateral carotid artery disease ([facility] ) 01/25/2021    Hypertension     Dyslipidemia     Coronary artery disease involving native coronary artery of native heart with angina pectoris ([facility] ) 11/24/2020     She  has a past surgical history that includes Cardiac surgery; Carotid angioplasty; and Closed manipulation shoulder  Her family history includes Coronary artery disease in her mother  She  reports that she has never smoked  She has never used smokeless tobacco  She reports that she does not drink alcohol and does not use drugs    Current Outpatient Medications   Medication Sig Dispense Refill    amLODIPine (NORVASC) 5 mg tablet TAKE 1 TABLET BY MOUTH 2 TIMES DAILY 180 tablet 1    aspirin 325 mg tablet Take 325 mg by mouth daily      atorvastatin (LIPITOR) 40 mg tablet TAKE 1 TABLET BY MOUTH DAILY 90 tablet 1    ezetimibe (ZETIA) 10 mg tablet Take 1 tablet (10 mg total) by mouth daily 90 tablet 1    nitroglycerin (NITROSTAT) 0 4 mg SL tablet Place 1 tablet (0 4 mg total) under the tongue every 5 (five) minutes as needed for chest pain Nitrostat brand necessary 25 tablet 1    primidone (MYSOLINE) 50 mg tablet Take half tab (25 mg) in AM and 1 full tab (50 mg) at bedtime 135 tablet 0    LORazepam (ATIVAN) 0 5 mg tablet Take 0 5 tablets (0 25 mg total) by mouth 2 (two) times a day 10 tablet 1    losartan (COZAAR) 100 MG tablet Take 1 tablet (100 mg total) by mouth daily 90 tablet 1    terconazole (TERAZOL 7) 0 4 % vaginal cream Insert 1 applicator into the vagina daily at bedtime 45 g 1     No current facility-administered medications for this visit  Current Outpatient Medications on File Prior to Visit   Medication Sig    amLODIPine (NORVASC) 5 mg tablet TAKE 1 TABLET BY MOUTH 2 TIMES DAILY    aspirin 325 mg tablet Take 325 mg by mouth daily    atorvastatin (LIPITOR) 40 mg tablet TAKE 1 TABLET BY MOUTH DAILY    ezetimibe (ZETIA) 10 mg tablet Take 1 tablet (10 mg total) by mouth daily    nitroglycerin (NITROSTAT) 0 4 mg SL tablet Place 1 tablet (0 4 mg total) under the tongue every 5 (five) minutes as needed for chest pain Nitrostat brand necessary    LORazepam (ATIVAN) 0 5 mg tablet Take 0 5 tablets (0 25 mg total) by mouth 2 (two) times a day    losartan (COZAAR) 100 MG tablet Take 1 tablet (100 mg total) by mouth daily     No current facility-administered medications on file prior to visit  She has No Known Allergies       Review of Systems   Constitutional: Negative  HENT: Negative  Respiratory: Negative  Cardiovascular: Negative  Gastrointestinal: Negative            Objective:      /58 (BP Location: Left arm, Patient Position: Sitting, Cuff Size: Standard)   Pulse 68   Temp 98 °F (36 7 °C) (Temporal)   Resp 16   Ht 5' (1 524 m)   Wt 43 5 kg (96 lb)   SpO2 96%   BMI 18 75 kg/m²          Physical Exam  Constitutional: Appearance: Normal appearance  HENT:      Head: Normocephalic and atraumatic  Right Ear: Tympanic membrane and ear canal normal       Left Ear: Tympanic membrane and ear canal normal    Cardiovascular:      Rate and Rhythm: Normal rate and regular rhythm  Pulmonary:      Effort: Pulmonary effort is normal       Breath sounds: Normal breath sounds  Musculoskeletal:      Cervical back: Neck supple  Lymphadenopathy:      Cervical: No cervical adenopathy  Neurological:      Mental Status: She is alert

## 2021-08-04 ENCOUNTER — OFFICE VISIT (OUTPATIENT)
Dept: CARDIOLOGY CLINIC | Facility: CLINIC | Age: 81
End: 2021-08-04
Payer: COMMERCIAL

## 2021-08-04 VITALS
TEMPERATURE: 97.7 F | BODY MASS INDEX: 18.97 KG/M2 | HEIGHT: 60 IN | WEIGHT: 96.6 LBS | DIASTOLIC BLOOD PRESSURE: 60 MMHG | HEART RATE: 80 BPM | RESPIRATION RATE: 18 BRPM | SYSTOLIC BLOOD PRESSURE: 122 MMHG | OXYGEN SATURATION: 99 %

## 2021-08-04 DIAGNOSIS — I65.23 BILATERAL CAROTID ARTERY STENOSIS: ICD-10-CM

## 2021-08-04 DIAGNOSIS — E78.5 DYSLIPIDEMIA: Primary | ICD-10-CM

## 2021-08-04 PROCEDURE — 1160F RVW MEDS BY RX/DR IN RCRD: CPT | Performed by: INTERNAL MEDICINE

## 2021-08-04 PROCEDURE — 99214 OFFICE O/P EST MOD 30 MIN: CPT | Performed by: INTERNAL MEDICINE

## 2021-08-04 PROCEDURE — 1036F TOBACCO NON-USER: CPT | Performed by: INTERNAL MEDICINE

## 2021-08-04 NOTE — PROGRESS NOTES
Progress Note - Cardiology Office  [facility]    Jorge Joe [de-identified] y o  female MRN: [id number]  : 1940  Encounter: [id number]      ASSESSMENT:  Perioperative cardiac risk assessment for bilateral cataract surgery    Waiting for cardiac tests which are scheduled for      CAD, s/p CABG 2008 x 4  Patient has not had any regular follow-up or testing    Hypertension  BP today is 122/60 mmHg     Dyslipidemia  On atorvastatin 40 mg and Zetia 10 mg  Last labs were in January     History of bilateral carotid artery disease , s/p left CEA   Carotid ultrasound, 2021    Right: ICA 50/69% stenosis  ECA severe stenosis, antegrade vertebral flow    Left: widely patent ICA and endarterectomy site  Antegrade vertebral flow     RECOMMENDATIONS:  Vascular follow-up  Echocardiogram 21  Pharmacologic nuclear stress test 21   Lipid profile and LFTs    Will determine perioperative cardiac risk after reviewing results of above cardiac tests         Please call [phone] if any questions  HPI :     Jorge Joe is a [de-identified]y o  year old female who came for follow up  She denies any new or acute symptoms today  She has history of carotid artery stenosis, CEA, s/p CABG in  with no follow-up or testing since then  She was requested to have echocardiogram, pharmacologic nuclear stress test which are still pending      REVIEW OF SYSTEMS:  Complains of visual disturbance  Denies chest pain, unusual dyspnea, palpitations or syncope    Historical Information   Past Medical History:   Diagnosis Date    Dyslipidemia     Hypertension     Tremor      Past Surgical History:   Procedure Laterality Date    CARDIAC SURGERY      CAROTID ARTERY ANGIOPLASTY      CLOSED MANIPULATION SHOULDER       Social History     Substance and Sexual Activity   Alcohol Use Never     Social History     Substance and Sexual Activity   Drug Use Never     Social History     Tobacco Use   Smoking Status Never Smoker   Smokeless Tobacco Never Used     Family History:   Family History   Problem Relation Age of Onset    Coronary artery disease Mother        Meds/Allergies     No Known Allergies    Current Outpatient Medications:     amLODIPine (NORVASC) 5 mg tablet, TAKE 1 TABLET BY MOUTH 2 TIMES DAILY, Disp: 180 tablet, Rfl: 1    aspirin 325 mg tablet, Take 325 mg by mouth daily, Disp: , Rfl:     atorvastatin (LIPITOR) 40 mg tablet, TAKE 1 TABLET BY MOUTH DAILY, Disp: 90 tablet, Rfl: 1    ezetimibe (ZETIA) 10 mg tablet, Take 1 tablet (10 mg total) by mouth daily, Disp: 90 tablet, Rfl: 1    LORazepam (ATIVAN) 0 5 mg tablet, Take 0 5 tablets (0 25 mg total) by mouth 2 (two) times a day, Disp: 10 tablet, Rfl: 1    losartan (COZAAR) 100 MG tablet, Take 1 tablet (100 mg total) by mouth daily, Disp: 90 tablet, Rfl: 1    nitroglycerin (NITROSTAT) 0 4 mg SL tablet, Place 1 tablet (0 4 mg total) under the tongue every 5 (five) minutes as needed for chest pain Nitrostat brand necessary, Disp: 25 tablet, Rfl: 1    primidone (MYSOLINE) 50 mg tablet, Take half tab (25 mg) in AM and 1 full tab (50 mg) at bedtime, Disp: 135 tablet, Rfl: 0    terconazole (TERAZOL 7) 0 4 % vaginal cream, Insert 1 applicator into the vagina daily at bedtime, Disp: 45 g, Rfl: 1    Vitals:  /60 mmHg  HR 80/Min  BP Readings from Last 3 Encounters:   08/03/21 136/58   07/07/21 130/60   04/01/21 120/60       Physical Exam:  Physical Exam    Neurologic:  Alert & oriented x 3, no new focal deficits, Not in any acute distress,  Constitutional:  Well developed, well nourished, non-toxic appearance   Eyes: conjunctiva normal,   HENT:  Atraumatic, oropharynx moist, Neck- normal range of motion, no tenderness,  Neck supple, No JVP, + right carotid bruit  Respiratory:  Bilateral air entry, mostly clear to auscultation  Cardiovascular: S1-S2 regular with a I/VI ejection systolic murmur   GI:  Soft, nondistended, normal bowel sounds, nontender, no hepatosplenomegaly appreciated  Musculoskeletal:  No edema, no tenderness, no deformities  Skin:  Well hydrated, no rash   Lymphatic:  No lymphadenopathy noted   Extremities:  No edema and distal pulses are present          Cardiac testing:   No results found for this or any previous visit  Imaging:  Chest X-Ray:   No Chest XR results available for this patient  CT-scan of the chest:     No CTA results available for this patient  Lab Review   Lab Results   Component Value Date    WBC 4 05 (L) 12/21/2020    HGB 14 0 12/21/2020    HCT 44 5 12/21/2020    MCV 98 12/21/2020    RDW 13 4 12/21/2020     12/21/2020     BMP:  Lab Results   Component Value Date    SODIUM 138 01/20/2021    K 4 2 01/20/2021     01/20/2021    CO2 33 (H) 01/20/2021    BUN 19 01/20/2021    CREATININE 1 25 01/20/2021    GLUF 88 01/20/2021    CALCIUM 10 5 (H) 01/20/2021    EGFR 41 01/20/2021     LFT:  Lab Results   Component Value Date    AST 26 01/20/2021    ALT 23 01/20/2021    ALKPHOS 85 01/20/2021    TP 7 8 01/20/2021    ALB 4 1 01/20/2021      No components found for: [employer] [facility]  Lab Results   Component Value Date    [id number] 0 874 12/21/2020     No results found for: HGBA1C  Lipid Profile:   Lab Results   Component Value Date    CHOLESTEROL 144 01/20/2021    HDL 60 01/20/2021    LDLCALC 63 01/20/2021    TRIG 103 01/20/2021     Lab Results   Component Value Date    CHOLESTEROL 144 01/20/2021    CHOLESTEROL 172 12/21/2020     No results found for: CKTOTAL, CKMB, CKMBINDEX, TROPONINI  No results found for: NTBNP   No results found for this or any previous visit (from the past 672 hour(s))  Dr Ramiro Diaz MD, [facility]      "This note has been constructed using a voice recognition system  Therefore there may be syntax, spelling, and/or grammatical errors   Please call if you have any questions  "    ADDENDUM:  08/26/2021:   Patient's pharmacologic stress test was reviewed which is reported as normal study with normal LV systolic function  In view of this result, the patient has acceptable cardiac risk to undergo cataract surgery  If needed aspirin can be stopped for 5-7 days prior to surgery and then restarted once cleared by the ophthalmologist       Dr Nieves Solis MD, [facility]      "This note has been constructed using a voice recognition system  Therefore there may be syntax, spelling, and/or grammatical errors  Please call if you have any questions  "    12/01/2021:  Patient was cleared with acceptable cardiac risk to undergo cataract surgery on 08/26/2021    Dr Nieves Solis MD, [facility]      "This note has been constructed using a voice recognition system  Therefore there may be syntax, spelling, and/or grammatical errors   Please call if you have any questions  "

## 2021-08-05 ENCOUNTER — TELEPHONE (OUTPATIENT)
Dept: FAMILY MEDICINE CLINIC | Facility: CLINIC | Age: 81
End: 2021-08-05

## 2021-08-05 NOTE — TELEPHONE ENCOUNTER
Kristie Yang called in today confused about her Losartan  She was reading over visit summary and saw it saw Cathy Anthony was to be taking 100mg daily  However she's currently taking 50mg daily  Please advise on correct dosage       OK to leave message if Kristie Yang doesn't answer #[phone]

## 2021-08-06 ENCOUNTER — APPOINTMENT (OUTPATIENT)
Dept: LAB | Facility: MEDICAL CENTER | Age: 81
End: 2021-08-06
Payer: COMMERCIAL

## 2021-08-06 DIAGNOSIS — E78.5 DYSLIPIDEMIA: ICD-10-CM

## 2021-08-06 DIAGNOSIS — I10 ESSENTIAL HYPERTENSION: ICD-10-CM

## 2021-08-06 LAB
ALBUMIN SERPL BCP-MCNC: 3.9 G/DL (ref 3.5–5)
ALP SERPL-CCNC: 94 U/L (ref 46–116)
ALT SERPL W P-5'-P-CCNC: 26 U/L (ref 12–78)
AST SERPL W P-5'-P-CCNC: 29 U/L (ref 5–45)
BILIRUB DIRECT SERPL-MCNC: 0.08 MG/DL (ref 0–0.2)
BILIRUB SERPL-MCNC: 0.37 MG/DL (ref 0.2–1)
CHOLEST SERPL-MCNC: 162 MG/DL (ref 50–200)
HDLC SERPL-MCNC: 85 MG/DL
LDLC SERPL CALC-MCNC: 60 MG/DL (ref 0–100)
NONHDLC SERPL-MCNC: 77 MG/DL
PROT SERPL-MCNC: 7.8 G/DL (ref 6.4–8.2)
TRIGL SERPL-MCNC: 85 MG/DL

## 2021-08-06 PROCEDURE — 80076 HEPATIC FUNCTION PANEL: CPT

## 2021-08-06 PROCEDURE — 80061 LIPID PANEL: CPT | Performed by: INTERNAL MEDICINE

## 2021-08-06 PROCEDURE — 36415 COLL VENOUS BLD VENIPUNCTURE: CPT

## 2021-08-09 ENCOUNTER — TELEPHONE (OUTPATIENT)
Dept: CARDIOLOGY CLINIC | Facility: CLINIC | Age: 81
End: 2021-08-09

## 2021-08-09 NOTE — TELEPHONE ENCOUNTER
----- Message from Amos Dixon MD sent at 8/6/2021  2:10 PM EDT -----   Please call and inform patient that her blood test blood test showed that  her cholesterol is in normal range, andliver enzymes are normal     Continue current medications  Recommend repeat lipid profile and Liver Enzymes/Hepatic Panel in 6 months

## 2021-08-09 NOTE — TELEPHONE ENCOUNTER
Attempt to reach patient no avail v/m to leave a message;the [phone];PZUZ no v/m to leave a message

## 2021-08-10 RX ORDER — LOSARTAN POTASSIUM 50 MG/1
50 TABLET ORAL DAILY
COMMUNITY
Start: 2021-08-07 | End: 2021-10-25 | Stop reason: SDUPTHER

## 2021-08-10 NOTE — TELEPHONE ENCOUNTER
Pt is taking 50mg and Bp is stable  She'll continue on the dose per Dr Codi Sepulveda  Can we correct this in her chart?

## 2021-08-25 ENCOUNTER — HOSPITAL ENCOUNTER (OUTPATIENT)
Dept: NON INVASIVE DIAGNOSTICS | Facility: CLINIC | Age: 81
Discharge: HOME | End: 2021-08-25
Payer: COMMERCIAL

## 2021-08-25 DIAGNOSIS — I10 HYPERTENSION, UNSPECIFIED TYPE: ICD-10-CM

## 2021-08-25 DIAGNOSIS — I25.119 CORONARY ARTERY DISEASE INVOLVING NATIVE CORONARY ARTERY OF NATIVE HEART WITH ANGINA PECTORIS (HCC): ICD-10-CM

## 2021-08-25 DIAGNOSIS — I65.23 BILATERAL CAROTID ARTERY STENOSIS: ICD-10-CM

## 2021-08-25 PROCEDURE — 78452 HT MUSCLE IMAGE SPECT MULT: CPT | Performed by: INTERNAL MEDICINE

## 2021-08-25 PROCEDURE — 93017 CV STRESS TEST TRACING ONLY: CPT

## 2021-08-25 PROCEDURE — 93016 CV STRESS TEST SUPVJ ONLY: CPT | Performed by: INTERNAL MEDICINE

## 2021-08-25 PROCEDURE — A9502 TC99M TETROFOSMIN: HCPCS

## 2021-08-25 PROCEDURE — 78452 HT MUSCLE IMAGE SPECT MULT: CPT

## 2021-08-25 PROCEDURE — G1004 CDSM NDSC: HCPCS

## 2021-08-25 PROCEDURE — 93018 CV STRESS TEST I&R ONLY: CPT | Performed by: INTERNAL MEDICINE

## 2021-08-25 RX ADMIN — REGADENOSON 0.4 MG: 0.08 INJECTION, SOLUTION INTRAVENOUS at 12:45

## 2021-08-27 ENCOUNTER — TELEPHONE (OUTPATIENT)
Dept: CARDIOLOGY CLINIC | Facility: CLINIC | Age: 81
End: 2021-08-27

## 2021-08-27 NOTE — TELEPHONE ENCOUNTER
Judy Huitron will inform patient of stress test results and cardiac clearance for Cataract Extraction  Judy Huitron stated, patient cannot not do the cataract procedure due to other appointments

## 2021-08-27 NOTE — TELEPHONE ENCOUNTER
----- Message from Umesh Hernandez MD sent at 8/26/2021  4:11 PM EDT -----  Please inform the patient that the stress test showed NO evidence of any significant blockage in the blood vessels of your heart    Cardiac clearance for cataract surgery has been updated in my last progress note

## 2021-09-02 ENCOUNTER — HOSPITAL ENCOUNTER (OUTPATIENT)
Dept: NON INVASIVE DIAGNOSTICS | Facility: MEDICAL CENTER | Age: 81
Discharge: HOME | End: 2021-09-02
Payer: COMMERCIAL

## 2021-09-02 DIAGNOSIS — I25.119 CORONARY ARTERY DISEASE INVOLVING NATIVE CORONARY ARTERY OF NATIVE HEART WITH ANGINA PECTORIS (HCC): ICD-10-CM

## 2021-09-02 PROCEDURE — 93306 TTE W/DOPPLER COMPLETE: CPT | Performed by: INTERNAL MEDICINE

## 2021-09-02 PROCEDURE — 93306 TTE W/DOPPLER COMPLETE: CPT

## 2021-09-03 ENCOUNTER — TELEPHONE (OUTPATIENT)
Dept: CARDIOLOGY CLINIC | Facility: CLINIC | Age: 81
End: 2021-09-03

## 2021-09-03 NOTE — TELEPHONE ENCOUNTER
----- Message from Santos Garcia MD sent at 9/3/2021 11:46 AM EDT -----   Please call and inform patient that the Echocardiogram showed normal pumping function of the heart  No significant valve abnormality was seen

## 2021-09-17 ENCOUNTER — TELEPHONE (OUTPATIENT)
Dept: ADMINISTRATIVE | Facility: HOSPITAL | Age: 81
End: 2021-09-17

## 2021-09-17 ENCOUNTER — CONSULT (OUTPATIENT)
Dept: VASCULAR SURGERY | Facility: CLINIC | Age: 81
End: 2021-09-17
Payer: COMMERCIAL

## 2021-09-17 VITALS
DIASTOLIC BLOOD PRESSURE: 55 MMHG | TEMPERATURE: 98 F | WEIGHT: 93 LBS | HEIGHT: 60 IN | HEART RATE: 64 BPM | SYSTOLIC BLOOD PRESSURE: 119 MMHG | BODY MASS INDEX: 18.26 KG/M2

## 2021-09-17 DIAGNOSIS — I65.23 BILATERAL CAROTID ARTERY STENOSIS: ICD-10-CM

## 2021-09-17 PROCEDURE — 1036F TOBACCO NON-USER: CPT | Performed by: SURGERY

## 2021-09-17 PROCEDURE — 3078F DIAST BP <80 MM HG: CPT | Performed by: SURGERY

## 2021-09-17 PROCEDURE — 99204 OFFICE O/P NEW MOD 45 MIN: CPT | Performed by: SURGERY

## 2021-09-17 PROCEDURE — 3074F SYST BP LT 130 MM HG: CPT | Performed by: SURGERY

## 2021-09-17 NOTE — TELEPHONE ENCOUNTER
Spoke with patient  Patient expressed they would not like to schedule the appointment(s) listed below  Per patient, they will reach out to our office when they are ready to schedule  Provided patient with call back number ([phone] option 3, should they need to reschedule  Provided patient with call back number ([phone] option 3, should they need to reschedule  Patient's appointment(s) are scheduled for 09/17/2022      Dopplers  [] Abdominal Aorta Iliac (AOIL)  [x] Carotid (CV)   [] Celiac and/or Mesenteric  [] Endovascular Aortic Repair (EVAR)   [] Hemodialysis Access (HD)   [] Lower Limb Arterial (TRUDY)  [] Lower Limb Venous Duplex with Reflux (LEVDR)  [] Renal Artery  [] Upper Limb (UEA)    Advanced Imaging   [] CTA abdomen    [] CTA abdomen & pelvis    [] CT abdomen with/ without contrast  [] CT abdomen with contrast  [] CT abdomen without contrast    [] CT abdomen & pelvis with/ without contrast  [] CT abdomen & pelvis with contrast  [] CT abdomen & pelvis without contrast    Office Visit   [] New patient, patient last seen over 3 years ago  [] Risk factor modification   No OV per Dr Bao Ortega

## 2021-09-17 NOTE — LETTER
September 17, 2021     Alberta Lawrence, DO  [address] [facility] [address]    Patient: Casa Rangel   YOB: 1940   Date of Visit: 9/17/2021       Dear Dr Melissa Hay:    Thank you for referring Mitch Cho to me for evaluation  Below are the relevant portions of my assessment and plan of care  Patient is an 80-year-old female with history of bilateral carotid endarterectomy in approximately 2008, she cannot remember the actual date  The left carotid endarterectomy was performed within the [facility] system, the right carotid endarterectomy was performed in [facility] where the patient had lived for the last several years  She has now moved back to the area  Patient had a CV done on 7/14/21, which shows velocities consistent with 50-69% stenosis in the right ICA and no significant velocity elevations left ICA  Patient remains asymptomatic  Patient denies stroke/TIA symptoms  Patient denies confusion, sudden change in the eye site and headaches  Recommend annual surveillance with carotid duplex  Patient is non smoker  Patient is taking aspirin and atorvastatin  If you have questions, please do not hesitate to call me  I look forward to following Cathy Cruz along with you           Sincerely,        Ghazal Grier MD        CC: Rickey Curry MD

## 2021-09-17 NOTE — PROGRESS NOTES
Assessment/Plan:    Bilateral carotid artery disease (HCC)  Asymptomatic carotid stenosis  Patient with remote history of bilateral carotid endarterectomy  Minimal atherosclerotic disease in the left ICA with velocities consistent with less than 50% stenosis  Velocities in right ICA consistent with 50-69% stenosis  Patient is on aspirin and statin  Recommend annual carotid duplex evaluation  Diagnoses and all orders for this visit:    Bilateral carotid artery stenosis  -     Ambulatory referral to Vascular Surgery  -     VAS carotid complete study; Future          Subjective:      Patient ID: Mai Hines is a [de-identified] y o  female  Patient is an 49-year-old female with history of bilateral carotid endarterectomy in approximately 2008, she cannot remember the actual date  The left carotid endarterectomy was performed within the [facility] system, the right carotid endarterectomy was performed in [facility] where the patient had lived for the last several years  She has now moved back to the area  Patient had a CV done on 7/14/21, which shows velocities consistent with 50-69% stenosis in the right ICA and no significant velocity elevations left ICA  Patient remains asymptomatic  Patient denies stroke/TIA symptoms  Patient denies confusion, sudden change in the eye site and headaches  Recommend annual surveillance with carotid duplex  Patient is non smoker  Patient is taking aspirin and atorvastatin  The following portions of the patient's history were reviewed and updated as appropriate: allergies, current medications, past family history, past medical history, past social history, past surgical history and problem list     Review of Systems   Constitutional: Negative  HENT: Negative  Eyes: Negative  Respiratory: Negative  Cardiovascular: Negative  Gastrointestinal: Negative  Endocrine: Negative  Genitourinary: Negative  Musculoskeletal: Negative  Skin: Negative  Allergic/Immunologic: Negative  Neurological: Negative  Hematological: Negative  Psychiatric/Behavioral: Negative  Objective:      /55 (BP Location: Right arm, Patient Position: Sitting, Cuff Size: Standard)   Pulse 64   Temp 98 °F (36 7 °C) (Tympanic)   Ht 5' (1 524 m)   Wt 42 2 kg (93 lb)   BMI 18 16 kg/m²          Physical Exam  Vitals and nursing note reviewed  Constitutional:       Appearance: Normal appearance  She is well-developed  HENT:      Head: Normocephalic and atraumatic  Eyes:      Conjunctiva/sclera: Conjunctivae normal       Pupils: Pupils are equal, round, and reactive to light  Neck:      Vascular: No JVD  Cardiovascular:      Rate and Rhythm: Normal rate and regular rhythm  Heart sounds: Normal heart sounds  Pulmonary:      Effort: Pulmonary effort is normal  No respiratory distress  Breath sounds: Normal breath sounds  No stridor  No wheezing or rales  Chest:      Chest wall: No tenderness  Abdominal:      General: There is no distension  Palpations: Abdomen is soft  There is no mass  Tenderness: There is no abdominal tenderness  There is no guarding or rebound  Musculoskeletal:         General: No tenderness or deformity  Normal range of motion  Cervical back: Normal range of motion and neck supple  Skin:     General: Skin is warm and dry  Findings: No erythema or rash  Neurological:      General: No focal deficit present  Mental Status: She is alert and oriented to person, place, and time  Cranial Nerves: No cranial nerve deficit  Sensory: No sensory deficit  Motor: No weakness  Gait: Gait normal    Psychiatric:         Behavior: Behavior normal          Thought Content:  Thought content normal

## 2021-09-17 NOTE — ASSESSMENT & PLAN NOTE
Asymptomatic carotid stenosis  Patient with remote history of bilateral carotid endarterectomy  Minimal atherosclerotic disease in the left ICA with velocities consistent with less than 50% stenosis  Velocities in right ICA consistent with 50-69% stenosis  Patient is on aspirin and statin  Recommend annual carotid duplex evaluation

## 2021-10-07 ENCOUNTER — OFFICE VISIT (OUTPATIENT)
Dept: NEUROLOGY | Facility: CLINIC | Age: 81
End: 2021-10-07
Payer: COMMERCIAL

## 2021-10-07 VITALS
SYSTOLIC BLOOD PRESSURE: 110 MMHG | WEIGHT: 93.6 LBS | HEART RATE: 66 BPM | BODY MASS INDEX: 17.67 KG/M2 | DIASTOLIC BLOOD PRESSURE: 70 MMHG | HEIGHT: 61 IN | TEMPERATURE: 97.6 F

## 2021-10-07 DIAGNOSIS — G25.0 TREMOR, ESSENTIAL: Primary | ICD-10-CM

## 2021-10-07 PROCEDURE — 1160F RVW MEDS BY RX/DR IN RCRD: CPT | Performed by: PSYCHIATRY & NEUROLOGY

## 2021-10-07 PROCEDURE — 1036F TOBACCO NON-USER: CPT | Performed by: PSYCHIATRY & NEUROLOGY

## 2021-10-07 PROCEDURE — 99214 OFFICE O/P EST MOD 30 MIN: CPT | Performed by: PSYCHIATRY & NEUROLOGY

## 2021-10-07 PROCEDURE — 3074F SYST BP LT 130 MM HG: CPT | Performed by: PSYCHIATRY & NEUROLOGY

## 2021-10-07 PROCEDURE — 3078F DIAST BP <80 MM HG: CPT | Performed by: PSYCHIATRY & NEUROLOGY

## 2021-10-07 RX ORDER — MIRTAZAPINE 15 MG/1
TABLET, FILM COATED ORAL
Qty: 30 TABLET | Refills: 3 | Status: SHIPPED | OUTPATIENT
Start: 2021-10-07 | End: 2022-02-08 | Stop reason: SDUPTHER

## 2021-10-25 DIAGNOSIS — I10 ESSENTIAL HYPERTENSION: ICD-10-CM

## 2021-10-25 DIAGNOSIS — E78.5 HYPERLIPIDEMIA, UNSPECIFIED HYPERLIPIDEMIA TYPE: ICD-10-CM

## 2021-10-25 RX ORDER — LOSARTAN POTASSIUM 50 MG/1
50 TABLET ORAL DAILY
Qty: 90 TABLET | Refills: 1 | Status: SHIPPED | OUTPATIENT
Start: 2021-10-25 | End: 2022-02-08 | Stop reason: SDUPTHER

## 2021-10-25 RX ORDER — EZETIMIBE 10 MG/1
10 TABLET ORAL DAILY
Qty: 90 TABLET | Refills: 1 | Status: SHIPPED | OUTPATIENT
Start: 2021-10-25 | End: 2022-02-08 | Stop reason: SDUPTHER

## 2021-10-25 RX ORDER — AMLODIPINE BESYLATE 5 MG/1
5 TABLET ORAL 2 TIMES DAILY
Qty: 180 TABLET | Refills: 1 | Status: SHIPPED | OUTPATIENT
Start: 2021-10-25 | End: 2022-02-08 | Stop reason: SDUPTHER

## 2021-10-25 RX ORDER — ATORVASTATIN CALCIUM 40 MG/1
40 TABLET, FILM COATED ORAL DAILY
Qty: 90 TABLET | Refills: 1 | Status: SHIPPED | OUTPATIENT
Start: 2021-10-25 | End: 2022-02-08 | Stop reason: SDUPTHER

## 2021-11-12 ENCOUNTER — PRE-OP CATARACT MEASUREMENTS (OUTPATIENT)
Dept: URBAN - METROPOLITAN AREA CLINIC 6 | Facility: CLINIC | Age: 81
End: 2021-11-12

## 2021-11-12 DIAGNOSIS — H25.813: ICD-10-CM

## 2021-11-12 PROCEDURE — 1036F TOBACCO NON-USER: CPT

## 2021-11-12 PROCEDURE — 92014 COMPRE OPH EXAM EST PT 1/>: CPT

## 2021-11-12 PROCEDURE — G8427 DOCREV CUR MEDS BY ELIG CLIN: HCPCS

## 2021-11-12 PROCEDURE — 92136 OPHTHALMIC BIOMETRY: CPT

## 2021-11-12 ASSESSMENT — VISUAL ACUITY
OS_PH: 20/40-2
OD_CC: 20/60
OS_CC: 20/60
OD_PH: 20/50
OS_GLARE: 20/400
OD_GLARE: 20/400-1

## 2021-11-13 ENCOUNTER — IMMUNIZATIONS (OUTPATIENT)
Dept: FAMILY MEDICINE CLINIC | Facility: HOSPITAL | Age: 81
End: 2021-11-13

## 2021-11-13 DIAGNOSIS — Z23 ENCOUNTER FOR IMMUNIZATION: Primary | ICD-10-CM

## 2021-11-13 PROCEDURE — 0001A COVID-19 PFIZER VACC 0.3 ML: CPT | Performed by: NURSE PRACTITIONER

## 2021-11-13 PROCEDURE — 91300 COVID-19 PFIZER VACC 0.3 ML: CPT | Performed by: NURSE PRACTITIONER

## 2021-11-18 ENCOUNTER — OFFICE VISIT (OUTPATIENT)
Dept: FAMILY MEDICINE CLINIC | Facility: CLINIC | Age: 81
End: 2021-11-18
Payer: COMMERCIAL

## 2021-11-18 VITALS
BODY MASS INDEX: 17.94 KG/M2 | OXYGEN SATURATION: 98 % | DIASTOLIC BLOOD PRESSURE: 68 MMHG | SYSTOLIC BLOOD PRESSURE: 120 MMHG | HEART RATE: 64 BPM | RESPIRATION RATE: 16 BRPM | WEIGHT: 95 LBS | TEMPERATURE: 97.4 F | HEIGHT: 61 IN

## 2021-11-18 DIAGNOSIS — I25.119 CORONARY ARTERY DISEASE INVOLVING NATIVE CORONARY ARTERY OF NATIVE HEART WITH ANGINA PECTORIS (HCC): ICD-10-CM

## 2021-11-18 DIAGNOSIS — I10 HYPERTENSION, UNSPECIFIED TYPE: ICD-10-CM

## 2021-11-18 DIAGNOSIS — I65.23 BILATERAL CAROTID ARTERY STENOSIS: ICD-10-CM

## 2021-11-18 DIAGNOSIS — G25.0 TREMOR, ESSENTIAL: ICD-10-CM

## 2021-11-18 DIAGNOSIS — Z01.818 PRE-OP EXAM: ICD-10-CM

## 2021-11-18 DIAGNOSIS — E78.5 DYSLIPIDEMIA: ICD-10-CM

## 2021-11-18 DIAGNOSIS — H26.9 CATARACT OF BOTH EYES, UNSPECIFIED CATARACT TYPE: Primary | ICD-10-CM

## 2021-11-18 PROBLEM — E46 PROTEIN-CALORIE MALNUTRITION, UNSPECIFIED SEVERITY (HCC): Status: ACTIVE | Noted: 2021-11-18

## 2021-11-18 PROCEDURE — 93000 ELECTROCARDIOGRAM COMPLETE: CPT | Performed by: INTERNAL MEDICINE

## 2021-11-18 PROCEDURE — 99214 OFFICE O/P EST MOD 30 MIN: CPT | Performed by: INTERNAL MEDICINE

## 2021-11-18 PROCEDURE — 3078F DIAST BP <80 MM HG: CPT | Performed by: INTERNAL MEDICINE

## 2021-11-18 PROCEDURE — 1036F TOBACCO NON-USER: CPT | Performed by: INTERNAL MEDICINE

## 2021-11-18 PROCEDURE — 3725F SCREEN DEPRESSION PERFORMED: CPT | Performed by: INTERNAL MEDICINE

## 2021-11-18 PROCEDURE — 1160F RVW MEDS BY RX/DR IN RCRD: CPT | Performed by: INTERNAL MEDICINE

## 2021-11-18 PROCEDURE — 1101F PT FALLS ASSESS-DOCD LE1/YR: CPT | Performed by: INTERNAL MEDICINE

## 2021-11-18 PROCEDURE — 3288F FALL RISK ASSESSMENT DOCD: CPT | Performed by: INTERNAL MEDICINE

## 2021-11-18 PROCEDURE — 3074F SYST BP LT 130 MM HG: CPT | Performed by: INTERNAL MEDICINE

## 2021-11-30 ENCOUNTER — PREPPED CHART (OUTPATIENT)
Dept: URBAN - METROPOLITAN AREA CLINIC 6 | Facility: CLINIC | Age: 81
End: 2021-11-30

## 2021-12-01 ENCOUNTER — TELEPHONE (OUTPATIENT)
Dept: CARDIOLOGY CLINIC | Facility: CLINIC | Age: 81
End: 2021-12-01

## 2021-12-02 ENCOUNTER — SURGERY/PROCEDURE (OUTPATIENT)
Dept: URBAN - METROPOLITAN AREA SURGICAL CENTER 6 | Facility: SURGICAL CENTER | Age: 81
End: 2021-12-02

## 2021-12-02 DIAGNOSIS — H25.812: ICD-10-CM

## 2021-12-02 PROCEDURE — 66984 XCAPSL CTRC RMVL W/O ECP: CPT

## 2021-12-03 ENCOUNTER — 1 DAY POST-OP (OUTPATIENT)
Dept: URBAN - METROPOLITAN AREA CLINIC 6 | Facility: CLINIC | Age: 81
End: 2021-12-03

## 2021-12-03 DIAGNOSIS — Z96.1: ICD-10-CM

## 2021-12-03 PROCEDURE — 1036F TOBACCO NON-USER: CPT

## 2021-12-03 PROCEDURE — 99024 POSTOP FOLLOW-UP VISIT: CPT

## 2021-12-03 PROCEDURE — G8427 DOCREV CUR MEDS BY ELIG CLIN: HCPCS

## 2021-12-03 ASSESSMENT — TONOMETRY: OS_IOP_MMHG: 9

## 2021-12-03 ASSESSMENT — VISUAL ACUITY: OS_SC: 20/50+1

## 2021-12-10 ENCOUNTER — 1 WEEK POST-OP (OUTPATIENT)
Dept: URBAN - METROPOLITAN AREA CLINIC 6 | Facility: CLINIC | Age: 81
End: 2021-12-10

## 2021-12-10 DIAGNOSIS — Z96.1: ICD-10-CM

## 2021-12-10 DIAGNOSIS — H25.811: ICD-10-CM

## 2021-12-10 PROCEDURE — 92136 OPHTHALMIC BIOMETRY: CPT

## 2021-12-10 PROCEDURE — 1036F TOBACCO NON-USER: CPT

## 2021-12-10 PROCEDURE — G8427 DOCREV CUR MEDS BY ELIG CLIN: HCPCS

## 2021-12-10 PROCEDURE — 99024 POSTOP FOLLOW-UP VISIT: CPT

## 2021-12-10 ASSESSMENT — TONOMETRY
OS_IOP_MMHG: 7
OD_IOP_MMHG: 10

## 2021-12-10 ASSESSMENT — VISUAL ACUITY
OS_SC: 20/70-1
OD_CC: 20/60

## 2021-12-13 DIAGNOSIS — B37.9 CANDIDIASIS: ICD-10-CM

## 2021-12-16 ENCOUNTER — SURGERY/PROCEDURE (OUTPATIENT)
Dept: URBAN - METROPOLITAN AREA SURGICAL CENTER 6 | Facility: SURGICAL CENTER | Age: 81
End: 2021-12-16

## 2021-12-16 DIAGNOSIS — H57.03: ICD-10-CM

## 2021-12-16 DIAGNOSIS — H25.811: ICD-10-CM

## 2021-12-16 PROCEDURE — 66982 XCAPSL CTRC RMVL CPLX WO ECP: CPT | Mod: 79,RT

## 2021-12-17 ENCOUNTER — 1 DAY POST-OP (OUTPATIENT)
Dept: URBAN - METROPOLITAN AREA CLINIC 6 | Facility: CLINIC | Age: 81
End: 2021-12-17

## 2021-12-17 DIAGNOSIS — Z96.1: ICD-10-CM

## 2021-12-17 PROCEDURE — G8427 DOCREV CUR MEDS BY ELIG CLIN: HCPCS

## 2021-12-17 PROCEDURE — 1036F TOBACCO NON-USER: CPT

## 2021-12-17 PROCEDURE — 99024 POSTOP FOLLOW-UP VISIT: CPT

## 2021-12-17 ASSESSMENT — TONOMETRY
OS_IOP_MMHG: 10
OD_IOP_MMHG: 16

## 2021-12-17 ASSESSMENT — VISUAL ACUITY
OD_SC: 20/70
OS_SC: 20/80+2

## 2021-12-23 ENCOUNTER — 1 WEEK POST-OP (OUTPATIENT)
Dept: URBAN - METROPOLITAN AREA CLINIC 6 | Facility: CLINIC | Age: 81
End: 2021-12-23

## 2021-12-23 DIAGNOSIS — Z96.1: ICD-10-CM

## 2021-12-23 PROCEDURE — G8427 DOCREV CUR MEDS BY ELIG CLIN: HCPCS

## 2021-12-23 PROCEDURE — 1036F TOBACCO NON-USER: CPT

## 2021-12-23 PROCEDURE — 99024 POSTOP FOLLOW-UP VISIT: CPT

## 2021-12-23 ASSESSMENT — VISUAL ACUITY
OD_SC: 20/60
OS_SC: CF 4FT

## 2021-12-23 ASSESSMENT — TONOMETRY
OD_IOP_MMHG: 9
OS_IOP_MMHG: 7

## 2022-01-14 ENCOUNTER — 3 WEEK POST-OP (OUTPATIENT)
Dept: URBAN - METROPOLITAN AREA CLINIC 6 | Facility: CLINIC | Age: 82
End: 2022-01-14

## 2022-01-14 DIAGNOSIS — Z96.1: ICD-10-CM

## 2022-01-14 DIAGNOSIS — H52.13: ICD-10-CM

## 2022-01-14 DIAGNOSIS — H16.222: ICD-10-CM

## 2022-01-14 PROCEDURE — 92015 DETERMINE REFRACTIVE STATE: CPT

## 2022-01-14 PROCEDURE — 99024 POSTOP FOLLOW-UP VISIT: CPT

## 2022-01-14 ASSESSMENT — VISUAL ACUITY
OD_SC: 20/200
OS_SC: 20/100+1

## 2022-01-18 ENCOUNTER — TELEPHONE (OUTPATIENT)
Dept: FAMILY MEDICINE CLINIC | Facility: CLINIC | Age: 82
End: 2022-01-18

## 2022-01-18 NOTE — TELEPHONE ENCOUNTER
Patent's sister called in- stated she's still having chronic issues with vaginal yeast infections  Asking for a refill of Terconazole or maybe something that would work better? Used the refill sent last month and the refill  Please advise       Juan Armendariz

## 2022-01-19 ENCOUNTER — OFFICE VISIT (OUTPATIENT)
Dept: FAMILY MEDICINE CLINIC | Facility: CLINIC | Age: 82
End: 2022-01-19
Payer: COMMERCIAL

## 2022-01-19 VITALS
BODY MASS INDEX: 17.94 KG/M2 | SYSTOLIC BLOOD PRESSURE: 122 MMHG | OXYGEN SATURATION: 97 % | HEART RATE: 66 BPM | WEIGHT: 95 LBS | DIASTOLIC BLOOD PRESSURE: 74 MMHG | TEMPERATURE: 97.6 F | HEIGHT: 61 IN

## 2022-01-19 DIAGNOSIS — E78.5 DYSLIPIDEMIA: ICD-10-CM

## 2022-01-19 DIAGNOSIS — I10 HYPERTENSION, UNSPECIFIED TYPE: ICD-10-CM

## 2022-01-19 DIAGNOSIS — B37.9 CANDIDIASIS: ICD-10-CM

## 2022-01-19 DIAGNOSIS — R73.09 ELEVATED GLUCOSE: ICD-10-CM

## 2022-01-19 DIAGNOSIS — N89.8 VAGINAL ITCHING: Primary | ICD-10-CM

## 2022-01-19 LAB
SL AMB POCT GLUCOSE BLD: 160
SL AMB POCT HEMOGLOBIN AIC: 6 (ref ?–6.5)

## 2022-01-19 PROCEDURE — 82948 REAGENT STRIP/BLOOD GLUCOSE: CPT | Performed by: PHYSICIAN ASSISTANT

## 2022-01-19 PROCEDURE — 1036F TOBACCO NON-USER: CPT | Performed by: PHYSICIAN ASSISTANT

## 2022-01-19 PROCEDURE — 3078F DIAST BP <80 MM HG: CPT | Performed by: PHYSICIAN ASSISTANT

## 2022-01-19 PROCEDURE — 83036 HEMOGLOBIN GLYCOSYLATED A1C: CPT | Performed by: PHYSICIAN ASSISTANT

## 2022-01-19 PROCEDURE — 99214 OFFICE O/P EST MOD 30 MIN: CPT | Performed by: PHYSICIAN ASSISTANT

## 2022-01-19 PROCEDURE — 3074F SYST BP LT 130 MM HG: CPT | Performed by: PHYSICIAN ASSISTANT

## 2022-01-19 PROCEDURE — 1160F RVW MEDS BY RX/DR IN RCRD: CPT | Performed by: PHYSICIAN ASSISTANT

## 2022-01-19 NOTE — PROGRESS NOTES
Assessment/Plan:     Diagnoses and all orders for this visit:    Vaginal itching    Hypertension, unspecified type  -     Comprehensive metabolic panel    Dyslipidemia  -     Lipid panel    Elevated glucose  -     POCT blood glucose  -     POCT hemoglobin A1c    Candidiasis  -     terconazole (TERAZOL 7) 0 4 % vaginal cream; Apply a small amount nightly externally  Subjective:      Patient ID: Heather Dejesus is a 80 y o  female  Patient presents with external vaginal itching or discharge no bleeding  She has used Terazol 7 cream in the past with some relief  Patient states he had intermittent recurrence  I checked her random blood sugar was 161  Her hemoglobin A1c is 6 0  Patient is prediabetic  The patient is very very thin  Await high sugar foods such as cookies  More protein  Suggest supplements such as Glucerna  External exam shows atrophic vaginitis  Some redness around the urethra in 7 redness right inside the labia  There is no discharge  Patient has been searching the Terazol 7 cream into the vagina  Explained to the patient that this is going to be a chronic problem and she should apply the vaginal cream externally        The following portions of the patient's history were reviewed and updated as appropriate:   She   Patient Active Problem List    Diagnosis Date Noted    Protein-calorie malnutrition, unspecified severity ([facility] ) 11/18/2021    Tremor, essential 04/01/2021    Bilateral carotid artery disease ([facility] ) 01/25/2021    Hypertension     Dyslipidemia     Coronary artery disease involving native coronary artery of native heart with angina pectoris ([facility] ) 11/24/2020     Current Outpatient Medications   Medication Sig Dispense Refill    amLODIPine (NORVASC) 5 mg tablet Take 1 tablet (5 mg total) by mouth 2 (two) times a day 180 tablet 1    aspirin 325 mg tablet Take 325 mg by mouth daily      atorvastatin (LIPITOR) 40 mg tablet Take 1 tablet (40 mg total) by mouth daily 90 tablet 1    ezetimibe (ZETIA) 10 mg tablet Take 1 tablet (10 mg total) by mouth daily 90 tablet 1    losartan (COZAAR) 50 mg tablet Take 1 tablet (50 mg total) by mouth daily 90 tablet 1    mirtazapine (REMERON) 15 mg tablet Take half a tab at bedtime for one week, if tolerates well but tremor persists may increase to full tablet 30 tablet 3    nitroglycerin (NITROSTAT) 0 4 mg SL tablet Place 1 tablet (0 4 mg total) under the tongue every 5 (five) minutes as needed for chest pain Nitrostat brand necessary 25 tablet 1    terconazole (TERAZOL 7) 0 4 % vaginal cream Apply a small amount nightly externally  45 g 1     No current facility-administered medications for this visit  She has No Known Allergies       Review of Systems   Constitutional: Negative for activity change and appetite change  Genitourinary: Negative for difficulty urinating, dysuria, flank pain, frequency and vaginal discharge  Skin: Positive for rash  Vaginal  Rash           Objective:        Physical Exam  Vitals and nursing note reviewed  Exam conducted with a chaperone present  Constitutional:       General: She is not in acute distress  Appearance: Normal appearance  She is not ill-appearing  Comments: Thin   White  female   HENT:      Head: Normocephalic and atraumatic  Right Ear: External ear normal       Left Ear: External ear normal    Cardiovascular:      Rate and Rhythm: Regular rhythm  Heart sounds: Normal heart sounds  Pulmonary:      Effort: Pulmonary effort is normal       Breath sounds: Normal breath sounds  Genitourinary:     Exam position: Knee-chest position  Vagina: No vaginal discharge  Comments: Redness around the urethra also redness around the labia  atrophic vaginitis  Musculoskeletal:      Right lower leg: No edema  Left lower leg: No edema  Skin:     General: Skin is warm and dry  Coloration: Skin is not pale  Findings: No erythema     Neurological: General: No focal deficit present  Mental Status: She is alert

## 2022-02-08 ENCOUNTER — OFFICE VISIT (OUTPATIENT)
Dept: FAMILY MEDICINE CLINIC | Facility: CLINIC | Age: 82
End: 2022-02-08
Payer: COMMERCIAL

## 2022-02-08 VITALS
OXYGEN SATURATION: 98 % | DIASTOLIC BLOOD PRESSURE: 58 MMHG | RESPIRATION RATE: 16 BRPM | HEIGHT: 61 IN | HEART RATE: 74 BPM | WEIGHT: 94 LBS | BODY MASS INDEX: 17.75 KG/M2 | TEMPERATURE: 96.8 F | SYSTOLIC BLOOD PRESSURE: 110 MMHG

## 2022-02-08 DIAGNOSIS — Z13.29 SCREENING FOR THYROID DISORDER: ICD-10-CM

## 2022-02-08 DIAGNOSIS — B37.9 CANDIDIASIS: ICD-10-CM

## 2022-02-08 DIAGNOSIS — I77.9 DISORDER OF ARTERIES AND ARTERIOLES, UNSPECIFIED (HCC): ICD-10-CM

## 2022-02-08 DIAGNOSIS — E46 PROTEIN-CALORIE MALNUTRITION, UNSPECIFIED SEVERITY (HCC): ICD-10-CM

## 2022-02-08 DIAGNOSIS — E78.5 HYPERLIPIDEMIA, UNSPECIFIED HYPERLIPIDEMIA TYPE: ICD-10-CM

## 2022-02-08 DIAGNOSIS — I25.119 CORONARY ARTERY DISEASE INVOLVING NATIVE CORONARY ARTERY OF NATIVE HEART WITH ANGINA PECTORIS (HCC): ICD-10-CM

## 2022-02-08 DIAGNOSIS — N18.30 STAGE 3 CHRONIC KIDNEY DISEASE, UNSPECIFIED WHETHER STAGE 3A OR 3B CKD (HCC): ICD-10-CM

## 2022-02-08 DIAGNOSIS — I10 ESSENTIAL HYPERTENSION: Primary | ICD-10-CM

## 2022-02-08 DIAGNOSIS — G25.0 TREMOR, ESSENTIAL: ICD-10-CM

## 2022-02-08 DIAGNOSIS — Z13.0 SCREENING, ANEMIA, DEFICIENCY, IRON: ICD-10-CM

## 2022-02-08 PROCEDURE — 99214 OFFICE O/P EST MOD 30 MIN: CPT | Performed by: INTERNAL MEDICINE

## 2022-02-08 PROCEDURE — 1170F FXNL STATUS ASSESSED: CPT | Performed by: INTERNAL MEDICINE

## 2022-02-08 PROCEDURE — 1125F AMNT PAIN NOTED PAIN PRSNT: CPT | Performed by: INTERNAL MEDICINE

## 2022-02-08 PROCEDURE — G0439 PPPS, SUBSEQ VISIT: HCPCS | Performed by: INTERNAL MEDICINE

## 2022-02-08 RX ORDER — MIRTAZAPINE 15 MG/1
TABLET, FILM COATED ORAL
Qty: 90 TABLET | Refills: 1 | Status: SHIPPED | OUTPATIENT
Start: 2022-02-08 | End: 2022-06-09 | Stop reason: SDUPTHER

## 2022-02-08 RX ORDER — EZETIMIBE 10 MG/1
10 TABLET ORAL DAILY
Qty: 90 TABLET | Refills: 1 | Status: SHIPPED | OUTPATIENT
Start: 2022-02-08 | End: 2022-06-09 | Stop reason: SDUPTHER

## 2022-02-08 RX ORDER — NITROGLYCERIN 0.4 MG/1
0.4 TABLET SUBLINGUAL
Qty: 100 TABLET | Refills: 1 | Status: SHIPPED | OUTPATIENT
Start: 2022-02-08 | End: 2022-02-15 | Stop reason: SDUPTHER

## 2022-02-08 RX ORDER — ATORVASTATIN CALCIUM 40 MG/1
40 TABLET, FILM COATED ORAL DAILY
Qty: 90 TABLET | Refills: 1 | Status: SHIPPED | OUTPATIENT
Start: 2022-02-08 | End: 2022-06-09 | Stop reason: SDUPTHER

## 2022-02-08 RX ORDER — LOSARTAN POTASSIUM 50 MG/1
50 TABLET ORAL DAILY
Qty: 90 TABLET | Refills: 1 | Status: SHIPPED | OUTPATIENT
Start: 2022-02-08 | End: 2022-06-09 | Stop reason: SDUPTHER

## 2022-02-08 RX ORDER — AMLODIPINE BESYLATE 5 MG/1
5 TABLET ORAL 2 TIMES DAILY
Qty: 180 TABLET | Refills: 1 | Status: SHIPPED | OUTPATIENT
Start: 2022-02-08 | End: 2022-06-09 | Stop reason: SDUPTHER

## 2022-02-08 NOTE — PATIENT INSTRUCTIONS
Medicare Preventive Visit Patient Instructions  Thank you for completing your Welcome to Medicare Visit or Medicare Annual Wellness Visit today  Your next wellness visit will be due in one year (2/9/2023)  The screening/preventive services that you may require over the next 5-10 years are detailed below  Some tests may not apply to you based off risk factors and/or age  Screening tests ordered at today's visit but not completed yet may show as past due  Also, please note that scanned in results may not display below  Preventive Screenings:  Service Recommendations Previous Testing/Comments   Colorectal Cancer Screening  * Colonoscopy    * Fecal Occult Blood Test (FOBT)/Fecal Immunochemical Test (FIT)  * Fecal DNA/Cologuard Test  * Flexible Sigmoidoscopy Age: 54-65 years old   Colonoscopy: every 10 years (may be performed more frequently if at higher risk)  OR  FOBT/FIT: every 1 year  OR  Cologuard: every 3 years  OR  Sigmoidoscopy: every 5 years  Screening may be recommended earlier than age 48 if at higher risk for colorectal cancer  Also, an individualized decision between you and your healthcare provider will decide whether screening between the ages of 74-80 would be appropriate  Colonoscopy: Not on file  FOBT/FIT: Not on file  Cologuard: Not on file  Sigmoidoscopy: Not on file          Breast Cancer Screening Age: 36 years old  Frequency: every 1-2 years  Not required if history of left and right mastectomy Mammogram: Not on file        Cervical Cancer Screening Between the ages of 21-29, pap smear recommended once every 3 years  Between the ages of 33-67, can perform pap smear with HPV co-testing every 5 years     Recommendations may differ for women with a history of total hysterectomy, cervical cancer, or abnormal pap smears in past  Pap Smear: Not on file    Screening Not Indicated   Hepatitis C Screening Once for adults born between [facility] and [place]  More frequently in patients at high risk for Hepatitis C Hep C Antibody: Not on file        Diabetes Screening 1-2 times per year if you're at risk for diabetes or have pre-diabetes Fasting glucose: 88 mg/dL   A1C: 6 0    Screening Current   Cholesterol Screening Once every 5 years if you don't have a lipid disorder  May order more often based on risk factors  Lipid panel: 08/06/2021    Screening Not Indicated  History Lipid Disorder     Other Preventive Screenings Covered by Medicare:  1  Abdominal Aortic Aneurysm (AAA) Screening: covered once if your at risk  You're considered to be at risk if you have a family history of AAA  2  Lung Cancer Screening: covers low dose CT scan once per year if you meet all of the following conditions: (1) Age 50-69; (2) No signs or symptoms of lung cancer; (3) Current smoker or have quit smoking within the last 15 years; (4) You have a tobacco smoking history of at least 30 pack years (packs per day multiplied by number of years you smoked); (5) You get a written order from a healthcare provider  3  Glaucoma Screening: covered annually if you're considered high risk: (1) You have diabetes OR (2) Family history of glaucoma OR (3)  aged 48 and older OR (3)  American aged 72 and older  3  Osteoporosis Screening: covered every 2 years if you meet one of the following conditions: (1) You're estrogen deficient and at risk for osteoporosis based off medical history and other findings; (2) Have a vertebral abnormality; (3) On glucocorticoid therapy for more than 3 months; (4) Have primary hyperparathyroidism; (5) On osteoporosis medications and need to assess response to drug therapy  · Last bone density test (DXA Scan): 12/21/2020   5  HIV Screening: covered annually if you're between the age of 15-65  Also covered annually if you are younger than 13 and older than 72 with risk factors for HIV infection  For pregnant patients, it is covered up to 3 times per pregnancy      Immunizations:  Immunization Recommendations   Influenza Vaccine Annual influenza vaccination during flu season is recommended for all persons aged >= 6 months who do not have contraindications   Pneumococcal Vaccine (Prevnar and Pneumovax)  * Prevnar = PCV13  * Pneumovax = PPSV23   Adults 25-60 years old: 1-3 doses may be recommended based on certain risk factors  Adults 72 years old: Prevnar (PCV13) vaccine recommended followed by Pneumovax (PPSV23) vaccine  If already received PPSV23 since turning 65, then PCV13 recommended at least one year after PPSV23 dose  Hepatitis B Vaccine 3 dose series if at intermediate or high risk (ex: diabetes, end stage renal disease, liver disease)   Tetanus (Td) Vaccine - COST NOT COVERED BY MEDICARE PART B Following completion of primary series, a booster dose should be given every 10 years to maintain immunity against tetanus  Td may also be given as tetanus wound prophylaxis  Tdap Vaccine - COST NOT COVERED BY MEDICARE PART B Recommended at least once for all adults  For pregnant patients, recommended with each pregnancy  Shingles Vaccine (Shingrix) - COST NOT COVERED BY MEDICARE PART B  2 shot series recommended in those aged 48 and above     Health Maintenance Due:  There are no preventive care reminders to display for this patient  Immunizations Due:      Topic Date Due    Pneumococcal Vaccine: 65+ Years (1 of 2 - PPSV23) Never done    DTaP,Tdap,and Td Vaccines (1 - Tdap) Never done    Influenza Vaccine (1) Never done     Advance Directives   What are advance directives? Advance directives are legal documents that state your wishes and plans for medical care  These plans are made ahead of time in case you lose your ability to make decisions for yourself  Advance directives can apply to any medical decision, such as the treatments you want, and if you want to donate organs  What are the types of advance directives?   There are many types of advance directives, and each state has rules about how to use them  You may choose a combination of any of the following:  · Living will: This is a written record of the treatment you want  You can also choose which treatments you do not want, which to limit, and which to stop at a certain time  This includes surgery, medicine, IV fluid, and tube feedings  · Durable power of  for healthcare [facility]): This is a written record that states who you want to make healthcare choices for you when you are unable to make them for yourself  This person, called a proxy, is usually a family member or a friend  You may choose more than 1 proxy  · Do not resuscitate (DNR) order:  A DNR order is used in case your heart stops beating or you stop breathing  It is a request not to have certain forms of treatment, such as CPR  A DNR order may be included in other types of advance directives  · Medical directive: This covers the care that you want if you are in a coma, near death, or unable to make decisions for yourself  You can list the treatments you want for each condition  Treatment may include pain medicine, surgery, blood transfusions, dialysis, IV or tube feedings, and a ventilator (breathing machine)  · Values history: This document has questions about your views, beliefs, and how you feel and think about life  This information can help others choose the care that you would choose  Why are advance directives important? An advance directive helps you control your care  Although spoken wishes may be used, it is better to have your wishes written down  Spoken wishes can be misunderstood, or not followed  Treatments may be given even if you do not want them  An advance directive may make it easier for your family to make difficult choices about your care  Underweight  Underweight is defined as having a body mass index (BMI) of less than 18 5 kg/m2   Anorexia  is a loss of appetite, decreased food intake, or both   Your appetite naturally decreases as you get older  You also get full faster than you used to  This occurs because your body needs less energy  Other body changes can also lead to a decreased appetite  Even though some appetite loss is normal, you still need to get enough calories and nutrients to keep you healthy  You can start to lose too much weight if you do not eat as much food as your body needs  Unwanted weight loss can cause health problems, or worsen health problems you already have  You can also become dehydrated if you do not drink enough liquid  How to eat healthy and get enough nutrients:   · Choose healthy foods  Eat a variety of fruits, vegetables, whole grains, low-fat dairy foods, lean meats, and other protein foods  Limit foods high in fat, sugar, and salt  Limit or avoid alcohol as directed  Work with a dietitian to help you plan your meals if you need to follow a special diet  A dietitian can also teach you how to modify foods if you have trouble chewing or swallowing  · Snack on healthy foods between meals  if you only eat a small amount during meals  Snacks provide extra healthy nutrients and calories between meals  Examples include fruit, cheese, and whole grain crackers  · Drink liquids as directed  to avoid dehydration  Drink liquids between meals if they cause you to get full too quickly during meals  Ask how much liquid to drink each day and which liquids are best for you  · Use herbs, spices, and flavor enhancers to add flavor to foods  Avoid using herbs and spice blends that also contain sodium  Ask your healthcare provider or dietitian about flavor enhancers  Flavor enhancers with ham, natural edward, and roast beef flavors can also be sprinkled on food to add flavor  · Share meals with others as often as you can  Eating with others may help you to eat better during meal time  Ask family members, neighbors, or friends to join you for lunch   There are also senior centers where you can meet people, and share meals with them    · Ask family and friends for help  with shopping or preparing foods  Ask for a ride to the grocery store, if needed  © Copyright [employer] 2018 Information is for End User's use only and may not be sold, redistributed or otherwise used for commercial purposes   All illustrations and images included in CareNotes® are the copyrighted property of A D A M , Inc  or [address] [place] [address]

## 2022-02-08 NOTE — PROGRESS NOTES
Assessment and Plan:     Problem List Items Addressed This Visit        Cardiovascular and Mediastinum    Coronary artery disease involving native coronary artery of native heart with angina pectoris (HCC)    Relevant Medications    nitroglycerin (NITROSTAT) 0 4 mg SL tablet    amLODIPine (NORVASC) 5 mg tablet       Nervous and Auditory    Tremor, essential    Relevant Medications    mirtazapine (REMERON) 15 mg tablet       Genitourinary    Stage 3 chronic kidney disease, unspecified whether stage 3a or 3b CKD (HCC)       Other    Protein-calorie malnutrition, unspecified severity ([facility] )      Other Visit Diagnoses     Essential hypertension    -  Primary    rto 6 month recheck    Relevant Medications    losartan (COZAAR) 50 mg tablet    ezetimibe (ZETIA) 10 mg tablet    amLODIPine (NORVASC) 5 mg tablet    Candidiasis        prn external use of terazol  asks to have on hand    Relevant Medications    terconazole (TERAZOL 7) 0 4 % vaginal cream    Hyperlipidemia, unspecified hyperlipidemia type        on statin due for lab    Relevant Medications    ezetimibe (ZETIA) 10 mg tablet    atorvastatin (LIPITOR) 40 mg tablet    Other Relevant Orders    Lipid panel    Comprehensive metabolic panel    Disorder of arteries and arterioles, unspecified ([place] [address] )        Screening for thyroid disorder        Relevant Orders    TSH, 3rd generation with Free T4 reflex    Screening, anemia, deficiency, iron        Relevant Orders    CBC        BMI Counseling: Body mass index is 18 06 kg/m²  The BMI is below normal  Patient advised to gain weight  Rationale for BMI follow-up plan is due to patient being underweight  Depression Screening and Follow-up Plan: Patient was screened for depression during today's encounter  They screened negative with a PHQ-2 score of 0  Preventive health issues were discussed with patient, and age appropriate screening tests were ordered as noted in patient's After Visit Summary    In*Situ Architecture advice and appropriate referrals for health education or preventive services given if needed, as noted in patient's After Visit Summary       History of Present Illness:     Patient presents for Medicare Annual Wellness visit    Patient Care Team:  Edith Coates DO as PCP - General (Internal Medicine)     Problem List:     Patient Active Problem List   Diagnosis    Coronary artery disease involving native coronary artery of native heart with angina pectoris ([facility] )    Hypertension    Dyslipidemia    Bilateral carotid artery disease (HCC)    Tremor, essential    Protein-calorie malnutrition, unspecified severity ([facility] )    Stage 3 chronic kidney disease, unspecified whether stage 3a or 3b CKD ([facility] )      Past Medical and Surgical History:     Past Medical History:   Diagnosis Date    Dyslipidemia     Hypertension     Tremor      Past Surgical History:   Procedure Laterality Date    CARDIAC SURGERY      CAROTID ARTERY ANGIOPLASTY      CLOSED MANIPULATION SHOULDER        Family History:     Family History   Problem Relation Age of Onset    Coronary artery disease Mother       Social History:     Social History     Socioeconomic History    Marital status: Single     Spouse name: None    Number of children: None    Years of education: None    Highest education level: None   Occupational History    None   Tobacco Use    Smoking status: Never Smoker    Smokeless tobacco: Never Used   Vaping Use    Vaping Use: Never used   Substance and Sexual Activity    Alcohol use: Never    Drug use: Never    Sexual activity: None   Other Topics Concern    None   Social History Narrative    None     Social Determinants of Health     Financial Resource Strain: Not on file   Food Insecurity: Not on file   Transportation Needs: Not on file   Physical Activity: Not on file   Stress: Not on file   Social Connections: Not on file   Intimate Partner Violence: Not on file   Housing Stability: Not on file      Medications and Allergies:     Current Outpatient Medications   Medication Sig Dispense Refill    amLODIPine (NORVASC) 5 mg tablet Take 1 tablet (5 mg total) by mouth 2 (two) times a day 180 tablet 1    aspirin 325 mg tablet Take 325 mg by mouth daily      atorvastatin (LIPITOR) 40 mg tablet Take 1 tablet (40 mg total) by mouth daily 90 tablet 1    ezetimibe (ZETIA) 10 mg tablet Take 1 tablet (10 mg total) by mouth daily 90 tablet 1    losartan (COZAAR) 50 mg tablet Take 1 tablet (50 mg total) by mouth daily 90 tablet 1    mirtazapine (REMERON) 15 mg tablet Take half a tab at bedtime for one week, if tolerates well but tremor persists may increase to full tablet 90 tablet 1    nitroglycerin (NITROSTAT) 0 4 mg SL tablet Place 1 tablet (0 4 mg total) under the tongue every 5 (five) minutes as needed for chest pain Nitrostat brand necessary 100 tablet 1    terconazole (TERAZOL 7) 0 4 % vaginal cream Apply a small amount nightly externally  45 g 1     No current facility-administered medications for this visit  No Known Allergies   Immunizations:     Immunization History   Administered Date(s) Administered    COVID-19 PFIZER VACCINE 0 3 ML IM 03/06/2021, 03/25/2021, 11/13/2021      Health Maintenance: There are no preventive care reminders to display for this patient  Topic Date Due    Pneumococcal Vaccine: 65+ Years (1 of 2 - PPSV23) Never done    DTaP,Tdap,and Td Vaccines (1 - Tdap) Never done    Influenza Vaccine (1) Never done      Medicare Health Risk Assessment:     /58 (BP Location: Right arm, Patient Position: Sitting, Cuff Size: Standard)   Pulse 74   Temp (!) 96 8 °F (36 °C) (Temporal)   Resp 16   Ht 5' 0 5" (1 537 m)   Wt 42 6 kg (94 lb)   SpO2 98%   BMI 18 06 kg/m²      Pascualleonardo Dickerson is here for her Subsequent Wellness visit  Health Risk Assessment:   Patient rates overall health as good  Patient feels that their physical health rating is same  Patient is satisfied with their life  Eyesight was rated as same  Hearing was rated as same  Patient feels that their emotional and mental health rating is same  Patients states they are never, rarely angry  Patient states they are sometimes unusually tired/fatigued  Pain experienced in the last 7 days has been none  Patient states that she has experienced no weight loss or gain in last 6 months  Depression Screening:   PHQ-2 Score: 0      Fall Risk Screening: In the past year, patient has experienced: no history of falling in past year      Urinary Incontinence Screening:   Patient has not leaked urine accidently in the last six months  Home Safety:  Patient has trouble with stairs inside or outside of their home  Patient has working smoke alarms and has working carbon monoxide detector  Nutrition:   Current diet is No Added Salt and Regular  Low sugar    Medications:   Patient is currently taking over-the-counter supplements  OTC medications include: see medication list  Patient is not able to manage medications  Has family help    Activities of Daily Living (ADLs)/Instrumental Activities of Daily Living (IADLs):   Walk and transfer into and out of bed and chair?: Yes  Dress and groom yourself?: Yes    Bathe or shower yourself?: Yes    Feed yourself? Yes  Do your laundry/housekeeping?: Yes  Manage your money, pay your bills and track your expenses?: Yes  Make your own meals?: No    Do your own shopping?: Yes    ADL comments: Has hand tremors    Previous Hospitalizations:   Any hospitalizations or ED visits within the last 12 months?: No      Advance Care Planning:   Living will: No    Durable POA for healthcare:  Yes    Advanced directive: No      Comments: Sister is durable poa    Cognitive Screening:   Provider or family/friend/caregiver concerned regarding cognition?: No    PREVENTIVE SCREENINGS      Cardiovascular Screening:    General: Screening Not Indicated and History Lipid Disorder      Diabetes Screening:     General: Screening Current      Cervical Cancer Screening:    General: Screening Not Indicated      Lung Cancer Screening:     General: Screening Not Indicated    Screening, Brief Intervention, and Referral to Treatment (SBIRT)    Screening    Typical number of drinks in a week: 0    Single Item Drug Screening:  How often have you used an illegal drug (including marijuana) or a prescription medication for non-medical reasons in the past year? never    Single Item Drug Screen Score: 0  Interpretation: Negative screen for possible drug use disorder      Kallie Mortimer, DO

## 2022-02-11 ENCOUNTER — APPOINTMENT (OUTPATIENT)
Dept: LAB | Facility: MEDICAL CENTER | Age: 82
End: 2022-02-11
Payer: COMMERCIAL

## 2022-02-11 DIAGNOSIS — E78.5 HYPERLIPIDEMIA, UNSPECIFIED HYPERLIPIDEMIA TYPE: ICD-10-CM

## 2022-02-11 DIAGNOSIS — Z13.29 SCREENING FOR THYROID DISORDER: ICD-10-CM

## 2022-02-11 DIAGNOSIS — Z13.0 SCREENING, ANEMIA, DEFICIENCY, IRON: ICD-10-CM

## 2022-02-11 LAB
ALBUMIN SERPL BCP-MCNC: 4.4 G/DL (ref 3.5–5)
ALP SERPL-CCNC: 82 U/L (ref 46–116)
ALT SERPL W P-5'-P-CCNC: 34 U/L (ref 12–78)
ANION GAP SERPL CALCULATED.3IONS-SCNC: 6 MMOL/L (ref 4–13)
AST SERPL W P-5'-P-CCNC: 33 U/L (ref 5–45)
BILIRUB SERPL-MCNC: 0.44 MG/DL (ref 0.2–1)
BUN SERPL-MCNC: 32 MG/DL (ref 5–25)
CALCIUM SERPL-MCNC: 10.7 MG/DL (ref 8.3–10.1)
CHLORIDE SERPL-SCNC: 103 MMOL/L (ref 100–108)
CHOLEST SERPL-MCNC: 140 MG/DL
CO2 SERPL-SCNC: 28 MMOL/L (ref 21–32)
CREAT SERPL-MCNC: 1.52 MG/DL (ref 0.6–1.3)
ERYTHROCYTE [DISTWIDTH] IN BLOOD BY AUTOMATED COUNT: 13.1 % (ref 11.6–15.1)
GFR SERPL CREATININE-BSD FRML MDRD: 31 ML/MIN/1.73SQ M
GLUCOSE P FAST SERPL-MCNC: 88 MG/DL (ref 65–99)
HCT VFR BLD AUTO: 43.7 % (ref 34.8–46.1)
HDLC SERPL-MCNC: 65 MG/DL
HGB BLD-MCNC: 13.5 G/DL (ref 11.5–15.4)
LDLC SERPL CALC-MCNC: 59 MG/DL (ref 0–100)
MCH RBC QN AUTO: 29.7 PG (ref 26.8–34.3)
MCHC RBC AUTO-ENTMCNC: 30.9 G/DL (ref 31.4–37.4)
MCV RBC AUTO: 96 FL (ref 82–98)
NONHDLC SERPL-MCNC: 75 MG/DL
PLATELET # BLD AUTO: 202 THOUSANDS/UL (ref 149–390)
PMV BLD AUTO: 10.4 FL (ref 8.9–12.7)
POTASSIUM SERPL-SCNC: 4.5 MMOL/L (ref 3.5–5.3)
PROT SERPL-MCNC: 8.3 G/DL (ref 6.4–8.2)
RBC # BLD AUTO: 4.54 MILLION/UL (ref 3.81–5.12)
SODIUM SERPL-SCNC: 137 MMOL/L (ref 136–145)
TRIGL SERPL-MCNC: 82 MG/DL
TSH SERPL DL<=0.05 MIU/L-ACNC: 1.1 UIU/ML (ref 0.36–3.74)
WBC # BLD AUTO: 2.74 THOUSAND/UL (ref 4.31–10.16)

## 2022-02-11 PROCEDURE — 36415 COLL VENOUS BLD VENIPUNCTURE: CPT | Performed by: PHYSICIAN ASSISTANT

## 2022-02-11 PROCEDURE — 84443 ASSAY THYROID STIM HORMONE: CPT

## 2022-02-11 PROCEDURE — 80061 LIPID PANEL: CPT | Performed by: PHYSICIAN ASSISTANT

## 2022-02-11 PROCEDURE — 85027 COMPLETE CBC AUTOMATED: CPT

## 2022-02-11 PROCEDURE — 80053 COMPREHEN METABOLIC PANEL: CPT | Performed by: PHYSICIAN ASSISTANT

## 2022-02-15 DIAGNOSIS — I25.119 CORONARY ARTERY DISEASE INVOLVING NATIVE CORONARY ARTERY OF NATIVE HEART WITH ANGINA PECTORIS (HCC): ICD-10-CM

## 2022-02-15 RX ORDER — NITROGLYCERIN 0.4 MG/1
0.4 TABLET SUBLINGUAL
Qty: 100 TABLET | Refills: 1 | Status: SHIPPED | OUTPATIENT
Start: 2022-02-15

## 2022-02-24 ENCOUNTER — OFFICE VISIT (OUTPATIENT)
Dept: FAMILY MEDICINE CLINIC | Facility: CLINIC | Age: 82
End: 2022-02-24
Payer: COMMERCIAL

## 2022-02-24 VITALS
HEIGHT: 61 IN | OXYGEN SATURATION: 100 % | BODY MASS INDEX: 17.37 KG/M2 | SYSTOLIC BLOOD PRESSURE: 130 MMHG | DIASTOLIC BLOOD PRESSURE: 60 MMHG | WEIGHT: 92 LBS | RESPIRATION RATE: 16 BRPM | TEMPERATURE: 97.8 F | HEART RATE: 74 BPM

## 2022-02-24 DIAGNOSIS — E83.52 HYPERCALCEMIA: Primary | ICD-10-CM

## 2022-02-24 PROCEDURE — 1160F RVW MEDS BY RX/DR IN RCRD: CPT | Performed by: INTERNAL MEDICINE

## 2022-02-24 PROCEDURE — 3288F FALL RISK ASSESSMENT DOCD: CPT | Performed by: INTERNAL MEDICINE

## 2022-02-24 PROCEDURE — 3078F DIAST BP <80 MM HG: CPT | Performed by: INTERNAL MEDICINE

## 2022-02-24 PROCEDURE — 1036F TOBACCO NON-USER: CPT | Performed by: INTERNAL MEDICINE

## 2022-02-24 PROCEDURE — 1101F PT FALLS ASSESS-DOCD LE1/YR: CPT | Performed by: INTERNAL MEDICINE

## 2022-02-24 PROCEDURE — 99213 OFFICE O/P EST LOW 20 MIN: CPT | Performed by: INTERNAL MEDICINE

## 2022-02-24 PROCEDURE — 3725F SCREEN DEPRESSION PERFORMED: CPT | Performed by: INTERNAL MEDICINE

## 2022-02-24 PROCEDURE — 3075F SYST BP GE 130 - 139MM HG: CPT | Performed by: INTERNAL MEDICINE

## 2022-02-24 NOTE — PROGRESS NOTES
Depression Screening and Follow-up Plan: Patient was screened for depression during today's encounter  They screened negative with a PHQ-2 score of 1  Assessment/Plan:         Diagnoses and all orders for this visit:    Hypercalcemia  Comments:  d/c calcium/ check lab and cxr  Orders:  -     Angiotensin converting enzyme; Future  -     PTH, intact; Future  -     TSH, 3rd generation with Free T4 reflex; Future  -     Vitamin D 25 hydroxy; Future  -     Comprehensive metabolic panel; Future  -     XR chest pa & lateral; Future          Subjective:      Patient ID: Carlitos Gimenez is a 80 y o  female  Pt back  Her calcium level has slowly increased over the last 3 lab draws  Will d/c her calcium 600mg bid with vit d  Discussed to check lab  The following portions of the patient's history were reviewed and updated as appropriate: She  has a past medical history of Dyslipidemia, Hypertension, and Tremor  She   Patient Active Problem List    Diagnosis Date Noted    Stage 3 chronic kidney disease, unspecified whether stage 3a or 3b CKD ([place] [address] ) 02/08/2022    Protein-calorie malnutrition, unspecified severity ([place] [address] ) 11/18/2021    Tremor, essential 04/01/2021    Bilateral carotid artery disease ([place] [address] ) 01/25/2021    Hypertension     Dyslipidemia     Coronary artery disease involving native coronary artery of native heart with angina pectoris ([place] [address] ) 11/24/2020     She  has a past surgical history that includes Cardiac surgery; Carotid angioplasty; and Closed manipulation shoulder  Her family history includes Coronary artery disease in her mother  She  reports that she has never smoked  She has never used smokeless tobacco  She reports that she does not drink alcohol and does not use drugs    Current Outpatient Medications   Medication Sig Dispense Refill    amLODIPine (NORVASC) 5 mg tablet Take 1 tablet (5 mg total) by mouth 2 (two) times a day 180 tablet 1    aspirin 325 mg tablet Take 325 mg by mouth daily      atorvastatin (LIPITOR) 40 mg tablet Take 1 tablet (40 mg total) by mouth daily 90 tablet 1    ezetimibe (ZETIA) 10 mg tablet Take 1 tablet (10 mg total) by mouth daily 90 tablet 1    losartan (COZAAR) 50 mg tablet Take 1 tablet (50 mg total) by mouth daily 90 tablet 1    mirtazapine (REMERON) 15 mg tablet Take half a tab at bedtime for one week, if tolerates well but tremor persists may increase to full tablet (Patient taking differently: 15 mg daily at bedtime Take half a tab at bedtime for one week, if tolerates well but tremor persists may increase to full tablet ) 90 tablet 1    nitroglycerin (NITROSTAT) 0 4 mg SL tablet Place 1 tablet (0 4 mg total) under the tongue every 5 (five) minutes as needed for chest pain 100 tablet 1    terconazole (TERAZOL 7) 0 4 % vaginal cream Apply a small amount nightly externally  45 g 1     No current facility-administered medications for this visit  Current Outpatient Medications on File Prior to Visit   Medication Sig    amLODIPine (NORVASC) 5 mg tablet Take 1 tablet (5 mg total) by mouth 2 (two) times a day    aspirin 325 mg tablet Take 325 mg by mouth daily    atorvastatin (LIPITOR) 40 mg tablet Take 1 tablet (40 mg total) by mouth daily    ezetimibe (ZETIA) 10 mg tablet Take 1 tablet (10 mg total) by mouth daily    losartan (COZAAR) 50 mg tablet Take 1 tablet (50 mg total) by mouth daily    mirtazapine (REMERON) 15 mg tablet Take half a tab at bedtime for one week, if tolerates well but tremor persists may increase to full tablet (Patient taking differently: 15 mg daily at bedtime Take half a tab at bedtime for one week, if tolerates well but tremor persists may increase to full tablet )    nitroglycerin (NITROSTAT) 0 4 mg SL tablet Place 1 tablet (0 4 mg total) under the tongue every 5 (five) minutes as needed for chest pain    terconazole (TERAZOL 7) 0 4 % vaginal cream Apply a small amount nightly externally       No current facility-administered medications on file prior to visit  She has No Known Allergies       Review of Systems   Constitutional: Negative  HENT: Negative  Respiratory: Negative  Cardiovascular: Negative  Psychiatric/Behavioral: Negative for confusion  Objective:      /60 (BP Location: Right arm, Patient Position: Sitting, Cuff Size: Standard)   Pulse 74   Temp 97 8 °F (36 6 °C) (Temporal)   Resp 16   Ht 5' 0 5" (1 537 m)   Wt 41 7 kg (92 lb)   SpO2 100%   BMI 17 67 kg/m²          Physical Exam  Constitutional:       Appearance: Normal appearance  HENT:      Head: Normocephalic and atraumatic  Right Ear: Tympanic membrane, ear canal and external ear normal       Left Ear: Tympanic membrane, ear canal and external ear normal    Cardiovascular:      Rate and Rhythm: Normal rate and regular rhythm  Pulmonary:      Effort: Pulmonary effort is normal       Breath sounds: Normal breath sounds  Musculoskeletal:      Cervical back: Neck supple  Lymphadenopathy:      Cervical: No cervical adenopathy  Neurological:      Mental Status: She is alert

## 2022-03-02 ENCOUNTER — TELEPHONE (OUTPATIENT)
Dept: FAMILY MEDICINE CLINIC | Facility: CLINIC | Age: 82
End: 2022-03-02

## 2022-03-23 ENCOUNTER — APPOINTMENT (OUTPATIENT)
Dept: RADIOLOGY | Facility: MEDICAL CENTER | Age: 82
End: 2022-03-23
Payer: COMMERCIAL

## 2022-03-23 ENCOUNTER — APPOINTMENT (OUTPATIENT)
Dept: LAB | Facility: MEDICAL CENTER | Age: 82
End: 2022-03-23
Payer: COMMERCIAL

## 2022-03-23 DIAGNOSIS — E83.52 HYPERCALCEMIA: ICD-10-CM

## 2022-03-23 LAB
25(OH)D3 SERPL-MCNC: 70.6 NG/ML (ref 30–100)
ALBUMIN SERPL BCP-MCNC: 4 G/DL (ref 3.5–5)
ALP SERPL-CCNC: 83 U/L (ref 46–116)
ALT SERPL W P-5'-P-CCNC: 37 U/L (ref 12–78)
ANION GAP SERPL CALCULATED.3IONS-SCNC: 4 MMOL/L (ref 4–13)
AST SERPL W P-5'-P-CCNC: 34 U/L (ref 5–45)
BILIRUB SERPL-MCNC: 0.4 MG/DL (ref 0.2–1)
BUN SERPL-MCNC: 20 MG/DL (ref 5–25)
CALCIUM SERPL-MCNC: 10.1 MG/DL (ref 8.3–10.1)
CHLORIDE SERPL-SCNC: 106 MMOL/L (ref 100–108)
CO2 SERPL-SCNC: 30 MMOL/L (ref 21–32)
CREAT SERPL-MCNC: 1.25 MG/DL (ref 0.6–1.3)
GFR SERPL CREATININE-BSD FRML MDRD: 40 ML/MIN/1.73SQ M
GLUCOSE P FAST SERPL-MCNC: 94 MG/DL (ref 65–99)
POTASSIUM SERPL-SCNC: 4.1 MMOL/L (ref 3.5–5.3)
PROT SERPL-MCNC: 7.7 G/DL (ref 6.4–8.2)
PTH-INTACT SERPL-MCNC: 23.7 PG/ML (ref 18.4–80.1)
SODIUM SERPL-SCNC: 140 MMOL/L (ref 136–145)
TSH SERPL DL<=0.05 MIU/L-ACNC: 1.53 UIU/ML (ref 0.36–3.74)

## 2022-03-23 PROCEDURE — 82164 ANGIOTENSIN I ENZYME TEST: CPT

## 2022-03-23 PROCEDURE — 80053 COMPREHEN METABOLIC PANEL: CPT

## 2022-03-23 PROCEDURE — 36415 COLL VENOUS BLD VENIPUNCTURE: CPT

## 2022-03-23 PROCEDURE — 83970 ASSAY OF PARATHORMONE: CPT

## 2022-03-23 PROCEDURE — 84443 ASSAY THYROID STIM HORMONE: CPT

## 2022-03-23 PROCEDURE — 71046 X-RAY EXAM CHEST 2 VIEWS: CPT

## 2022-03-23 PROCEDURE — 82306 VITAMIN D 25 HYDROXY: CPT

## 2022-03-24 LAB — ACE SERPL-CCNC: 64 U/L (ref 14–82)

## 2022-05-20 ENCOUNTER — 3 MONTH FOLLOW UP (OUTPATIENT)
Dept: URBAN - METROPOLITAN AREA CLINIC 6 | Facility: CLINIC | Age: 82
End: 2022-05-20

## 2022-05-20 DIAGNOSIS — Z96.1: ICD-10-CM

## 2022-05-20 PROCEDURE — 92014 COMPRE OPH EXAM EST PT 1/>: CPT

## 2022-05-20 ASSESSMENT — VISUAL ACUITY
OS_CC: J10
OD_CC: J7
OS_PH: 20/200+1
OS_CC: 20/200
OD_CC: 20/80-2

## 2022-05-20 ASSESSMENT — TONOMETRY
OS_IOP_MMHG: 10
OD_IOP_MMHG: 12

## 2022-06-02 ENCOUNTER — RA CDI HCC (OUTPATIENT)
Dept: OTHER | Facility: HOSPITAL | Age: 82
End: 2022-06-02

## 2022-06-02 NOTE — PROGRESS NOTES
[facility]  coding opportunities       Chart reviewed, no opportunity found: CHART REVIEWED, NO OPPORTUNITY FOUND        Patients Insurance     Medicare Insurance: [employer] [facility] Advantage

## 2022-06-09 ENCOUNTER — OFFICE VISIT (OUTPATIENT)
Dept: FAMILY MEDICINE CLINIC | Facility: CLINIC | Age: 82
End: 2022-06-09
Payer: COMMERCIAL

## 2022-06-09 VITALS
SYSTOLIC BLOOD PRESSURE: 136 MMHG | TEMPERATURE: 97.6 F | WEIGHT: 97 LBS | RESPIRATION RATE: 16 BRPM | HEIGHT: 61 IN | BODY MASS INDEX: 18.31 KG/M2 | HEART RATE: 74 BPM | OXYGEN SATURATION: 98 % | DIASTOLIC BLOOD PRESSURE: 58 MMHG

## 2022-06-09 DIAGNOSIS — I10 ESSENTIAL HYPERTENSION: Primary | ICD-10-CM

## 2022-06-09 DIAGNOSIS — N89.8 VAGINAL ITCHING: ICD-10-CM

## 2022-06-09 DIAGNOSIS — G25.0 TREMOR, ESSENTIAL: ICD-10-CM

## 2022-06-09 DIAGNOSIS — H93.90 EAR LESION: ICD-10-CM

## 2022-06-09 DIAGNOSIS — E78.5 HYPERLIPIDEMIA, UNSPECIFIED HYPERLIPIDEMIA TYPE: ICD-10-CM

## 2022-06-09 PROCEDURE — 3075F SYST BP GE 130 - 139MM HG: CPT | Performed by: INTERNAL MEDICINE

## 2022-06-09 PROCEDURE — 3725F SCREEN DEPRESSION PERFORMED: CPT | Performed by: INTERNAL MEDICINE

## 2022-06-09 PROCEDURE — 3078F DIAST BP <80 MM HG: CPT | Performed by: INTERNAL MEDICINE

## 2022-06-09 PROCEDURE — 3288F FALL RISK ASSESSMENT DOCD: CPT | Performed by: INTERNAL MEDICINE

## 2022-06-09 PROCEDURE — 99214 OFFICE O/P EST MOD 30 MIN: CPT | Performed by: INTERNAL MEDICINE

## 2022-06-09 PROCEDURE — 1101F PT FALLS ASSESS-DOCD LE1/YR: CPT | Performed by: INTERNAL MEDICINE

## 2022-06-09 PROCEDURE — 1160F RVW MEDS BY RX/DR IN RCRD: CPT | Performed by: INTERNAL MEDICINE

## 2022-06-09 PROCEDURE — 1036F TOBACCO NON-USER: CPT | Performed by: INTERNAL MEDICINE

## 2022-06-09 RX ORDER — EZETIMIBE 10 MG/1
10 TABLET ORAL DAILY
Qty: 90 TABLET | Refills: 1 | Status: SHIPPED | OUTPATIENT
Start: 2022-06-09 | End: 2022-06-28 | Stop reason: SDUPTHER

## 2022-06-09 RX ORDER — METRONIDAZOLE 500 MG/1
500 TABLET ORAL EVERY 8 HOURS SCHEDULED
Qty: 30 TABLET | Refills: 0 | Status: SHIPPED | OUTPATIENT
Start: 2022-06-09 | End: 2022-06-19

## 2022-06-09 RX ORDER — ATORVASTATIN CALCIUM 40 MG/1
40 TABLET, FILM COATED ORAL DAILY
Qty: 90 TABLET | Refills: 1 | Status: SHIPPED | OUTPATIENT
Start: 2022-06-09 | End: 2022-06-28 | Stop reason: SDUPTHER

## 2022-06-09 RX ORDER — LOSARTAN POTASSIUM 50 MG/1
50 TABLET ORAL DAILY
Qty: 90 TABLET | Refills: 1 | Status: SHIPPED | OUTPATIENT
Start: 2022-06-09 | End: 2022-06-28 | Stop reason: SDUPTHER

## 2022-06-09 RX ORDER — MIRTAZAPINE 15 MG/1
15 TABLET, FILM COATED ORAL
Qty: 90 TABLET | Refills: 1 | Status: SHIPPED | OUTPATIENT
Start: 2022-06-09 | End: 2022-06-30 | Stop reason: SDUPTHER

## 2022-06-09 RX ORDER — AMLODIPINE BESYLATE 5 MG/1
5 TABLET ORAL 2 TIMES DAILY
Qty: 180 TABLET | Refills: 1 | Status: SHIPPED | OUTPATIENT
Start: 2022-06-09 | End: 2022-06-28 | Stop reason: SDUPTHER

## 2022-06-09 NOTE — ADDENDUM NOTE
Addended by: [facility] on: 6/9/2022 09:30 AM     Modules accepted: Orders

## 2022-06-09 NOTE — PROGRESS NOTES
Depression Screening and Follow-up Plan: Patient was screened for depression during today's encounter  They screened negative with a PHQ-2 score of 0  Assessment/Plan:         Diagnoses and all orders for this visit:    Tremor, essential  Comments:  on remeron  Orders:  -     mirtazapine (REMERON) 15 mg tablet; Take 1 tablet (15 mg total) by mouth daily at bedtime Take half a tab at bedtime for one week, if tolerates well but tremor persists may increase to full tablet    Essential hypertension  Comments:  rto 6 month recheck  Orders:  -     losartan (COZAAR) 50 mg tablet; Take 1 tablet (50 mg total) by mouth daily  -     ezetimibe (ZETIA) 10 mg tablet; Take 1 tablet (10 mg total) by mouth daily  -     amLODIPine (NORVASC) 5 mg tablet; Take 1 tablet (5 mg total) by mouth 2 (two) times a day    Hyperlipidemia, unspecified hyperlipidemia type  Comments:  on statin due for lab  Orders:  -     atorvastatin (LIPITOR) 40 mg tablet; Take 1 tablet (40 mg total) by mouth daily          Subjective:      Patient ID: Yuniel Riley is a 80 y o  female  Pt complains of vaginal itching at night  It was not relieved with terazone  Also cut her left ear  ? Nonhealing ear lesion  Referr to derm  She is on remeron for tremor but does not think helpful  Discussed trial d/c and see if unchanged  Her sister wants to keep her on it      The following portions of the patient's history were reviewed and updated as appropriate: She  has a past medical history of Dyslipidemia, Hypertension, and Tremor    She   Patient Active Problem List    Diagnosis Date Noted    Stage 3 chronic kidney disease, unspecified whether stage 3a or 3b CKD ([place] [address] ) 02/08/2022    Protein-calorie malnutrition, unspecified severity ([place] [address] ) 11/18/2021    Tremor, essential 04/01/2021    Bilateral carotid artery disease ([place] [address] ) 01/25/2021    Hypertension     Dyslipidemia     Coronary artery disease involving native coronary artery of native heart with angina pectoris ([facility] ) 11/24/2020     She  has a past surgical history that includes Cardiac surgery; Carotid angioplasty; and Closed manipulation shoulder  Her family history includes Coronary artery disease in her mother  She  reports that she has never smoked  She has never used smokeless tobacco  She reports that she does not drink alcohol and does not use drugs  Current Outpatient Medications   Medication Sig Dispense Refill    amLODIPine (NORVASC) 5 mg tablet Take 1 tablet (5 mg total) by mouth 2 (two) times a day 180 tablet 1    aspirin 325 mg tablet Take 325 mg by mouth daily      atorvastatin (LIPITOR) 40 mg tablet Take 1 tablet (40 mg total) by mouth daily 90 tablet 1    ezetimibe (ZETIA) 10 mg tablet Take 1 tablet (10 mg total) by mouth daily 90 tablet 1    losartan (COZAAR) 50 mg tablet Take 1 tablet (50 mg total) by mouth daily 90 tablet 1    mirtazapine (REMERON) 15 mg tablet Take 1 tablet (15 mg total) by mouth daily at bedtime Take half a tab at bedtime for one week, if tolerates well but tremor persists may increase to full tablet 90 tablet 1    nitroglycerin (NITROSTAT) 0 4 mg SL tablet Place 1 tablet (0 4 mg total) under the tongue every 5 (five) minutes as needed for chest pain 100 tablet 1    terconazole (TERAZOL 7) 0 4 % vaginal cream Apply a small amount nightly externally  45 g 1     No current facility-administered medications for this visit  Current Outpatient Medications on File Prior to Visit   Medication Sig    aspirin 325 mg tablet Take 325 mg by mouth daily    nitroglycerin (NITROSTAT) 0 4 mg SL tablet Place 1 tablet (0 4 mg total) under the tongue every 5 (five) minutes as needed for chest pain    terconazole (TERAZOL 7) 0 4 % vaginal cream Apply a small amount nightly externally      [DISCONTINUED] amLODIPine (NORVASC) 5 mg tablet Take 1 tablet (5 mg total) by mouth 2 (two) times a day    [DISCONTINUED] atorvastatin (LIPITOR) 40 mg tablet Take 1 tablet (40 mg total) by mouth daily    [DISCONTINUED] ezetimibe (ZETIA) 10 mg tablet Take 1 tablet (10 mg total) by mouth daily    [DISCONTINUED] losartan (COZAAR) 50 mg tablet Take 1 tablet (50 mg total) by mouth daily    [DISCONTINUED] mirtazapine (REMERON) 15 mg tablet Take half a tab at bedtime for one week, if tolerates well but tremor persists may increase to full tablet (Patient taking differently: 15 mg daily at bedtime Take half a tab at bedtime for one week, if tolerates well but tremor persists may increase to full tablet)     No current facility-administered medications on file prior to visit  She has No Known Allergies       Review of Systems   Constitutional: Negative  HENT: Negative  Respiratory: Negative  Cardiovascular: Negative  Gastrointestinal: Negative  Skin:        + left ear lesion   Neurological: Positive for tremors  Objective:      /58 (BP Location: Left arm, Patient Position: Sitting, Cuff Size: Standard)   Pulse 74   Temp 97 6 °F (36 4 °C) (Temporal)   Resp 16   Ht 5' 0 5" (1 537 m)   Wt 44 kg (97 lb)   SpO2 98%   BMI 18 63 kg/m²          Physical Exam  Constitutional:       Appearance: She is obese  HENT:      Head: Normocephalic and atraumatic  Right Ear: Tympanic membrane and ear canal normal       Left Ear: Tympanic membrane and ear canal normal       Ears:      Comments: Left lesion on helix  Cardiovascular:      Rate and Rhythm: Normal rate and regular rhythm  Pulmonary:      Effort: Pulmonary effort is normal       Breath sounds: Normal breath sounds  Musculoskeletal:      Cervical back: Neck supple  Lymphadenopathy:      Cervical: No cervical adenopathy  Neurological:      Mental Status: She is alert

## 2022-06-28 DIAGNOSIS — I10 ESSENTIAL HYPERTENSION: ICD-10-CM

## 2022-06-28 DIAGNOSIS — E78.5 HYPERLIPIDEMIA, UNSPECIFIED HYPERLIPIDEMIA TYPE: ICD-10-CM

## 2022-06-28 RX ORDER — EZETIMIBE 10 MG/1
10 TABLET ORAL DAILY
Qty: 90 TABLET | Refills: 1 | Status: SHIPPED | OUTPATIENT
Start: 2022-06-28

## 2022-06-28 RX ORDER — EZETIMIBE 10 MG/1
TABLET ORAL
Qty: 90 TABLET | Refills: 1 | OUTPATIENT
Start: 2022-06-28

## 2022-06-28 RX ORDER — ATORVASTATIN CALCIUM 40 MG/1
40 TABLET, FILM COATED ORAL DAILY
Qty: 90 TABLET | Refills: 1 | Status: SHIPPED | OUTPATIENT
Start: 2022-06-28

## 2022-06-28 RX ORDER — ATORVASTATIN CALCIUM 40 MG/1
TABLET, FILM COATED ORAL
Qty: 90 TABLET | Refills: 1 | OUTPATIENT
Start: 2022-06-28

## 2022-06-28 RX ORDER — AMLODIPINE BESYLATE 5 MG/1
5 TABLET ORAL 2 TIMES DAILY
Qty: 180 TABLET | Refills: 1 | Status: SHIPPED | OUTPATIENT
Start: 2022-06-28

## 2022-06-28 RX ORDER — AMLODIPINE BESYLATE 5 MG/1
TABLET ORAL
Qty: 180 TABLET | Refills: 1 | OUTPATIENT
Start: 2022-06-28

## 2022-06-28 RX ORDER — LOSARTAN POTASSIUM 50 MG/1
TABLET ORAL
Qty: 90 TABLET | Refills: 1 | OUTPATIENT
Start: 2022-06-28

## 2022-06-28 RX ORDER — LOSARTAN POTASSIUM 50 MG/1
50 TABLET ORAL DAILY
Qty: 90 TABLET | Refills: 1 | Status: SHIPPED | OUTPATIENT
Start: 2022-06-28

## 2022-06-30 DIAGNOSIS — G25.0 TREMOR, ESSENTIAL: ICD-10-CM

## 2022-06-30 RX ORDER — MIRTAZAPINE 15 MG/1
15 TABLET, FILM COATED ORAL
Qty: 90 TABLET | Refills: 1 | Status: SHIPPED | OUTPATIENT
Start: 2022-06-30 | End: 2022-07-05 | Stop reason: SDUPTHER

## 2022-07-05 DIAGNOSIS — G25.0 TREMOR, ESSENTIAL: ICD-10-CM

## 2022-07-05 RX ORDER — MIRTAZAPINE 15 MG/1
15 TABLET, FILM COATED ORAL
Qty: 90 TABLET | Refills: 1 | Status: SHIPPED | OUTPATIENT
Start: 2022-07-05

## 2022-08-25 ENCOUNTER — OFFICE VISIT (OUTPATIENT)
Dept: OBGYN CLINIC | Facility: MEDICAL CENTER | Age: 82
End: 2022-08-25
Payer: COMMERCIAL

## 2022-08-25 VITALS
BODY MASS INDEX: 18.09 KG/M2 | SYSTOLIC BLOOD PRESSURE: 150 MMHG | DIASTOLIC BLOOD PRESSURE: 70 MMHG | WEIGHT: 95.8 LBS | HEIGHT: 61 IN

## 2022-08-25 DIAGNOSIS — N95.2 ATROPHIC VAGINITIS: Primary | ICD-10-CM

## 2022-08-25 DIAGNOSIS — L29.2 VULVAR ITCHING: ICD-10-CM

## 2022-08-25 PROCEDURE — 99202 OFFICE O/P NEW SF 15 MIN: CPT | Performed by: PHYSICIAN ASSISTANT

## 2022-08-25 PROCEDURE — 1160F RVW MEDS BY RX/DR IN RCRD: CPT | Performed by: PHYSICIAN ASSISTANT

## 2022-08-25 RX ORDER — ESTRADIOL 0.1 MG/G
CREAM VAGINAL
Qty: 42.5 G | Refills: 0 | Status: SHIPPED | OUTPATIENT
Start: 2022-08-25

## 2022-08-25 NOTE — PROGRESS NOTES
Assessment/Plan:    1  Atrophic vaginitis  - Advised trial of estrace cream to be applied to the vulva QD x 2 wks in AM  Then twice weekly  Advised follow up in 4-6 wks for exam  If no improvement consider biopsy  - estradiol (ESTRACE VAGINAL) 0 1 mg/g vaginal cream; Apply pea sized amount to vulva once daily for two weeks in the morning  Then apply 2 times weekly  Dispense: 42 5 g; Refill: 0    2  Vulvar itching  - Advised trial of kenalog cream to be applied to the vulva QD x 2 wks in PM  Then twice weekly to treat irritation and inflammation  Advised use at night since patient feels most irritated during nighttime  Advised follow up in 4-6 wks for exam  If no improvement consider biopsy  - triamcinolone (KENALOG) 0 1 % ointment; Apply pea sized amount to the vulva once daily at night for two weeks  Then use 2 times weekly  Dispense: 30 g; Refill: 0    RTO in 4-6 wks for follow up    Subjective:      Patient ID: Dimitri Carrasco is a 80 y o  female  Pt presents today as a new patient for a problem visit  She complains of vulvar itching x 2 years  Lately states itching has been present daily  Worsens at night  Itching is external only  She has tried using terazol cream externally "for awhile now" without any relief  She does wear pantyliners daily for urinary leakage  Changes panty liners regularly  Denies any VB, discharge, odor, lesions, dysuria, hematuria, urgency, frequency, fever, chills, changes to bowel      The following portions of the patient's history were reviewed and updated as appropriate: allergies, current medications, past family history, past medical history, past social history, past surgical history and problem list     Review of Systems   Constitutional: Negative for chills, fatigue and fever  Respiratory: Negative for chest tightness and shortness of breath  Cardiovascular: Negative for chest pain  Gastrointestinal: Negative for abdominal pain, constipation and diarrhea  Genitourinary: Negative for difficulty urinating, dysuria, frequency, genital sores, hematuria, pelvic pain, urgency, vaginal bleeding, vaginal discharge and vaginal pain  Skin: Negative for rash  Objective:      /70 (BP Location: Left arm, Patient Position: Sitting, Cuff Size: Standard)   Ht 5' 0 5" (1 537 m)   Wt 43 5 kg (95 lb 12 8 oz)   BMI 18 40 kg/m²          Physical Exam  Abdominal:      General: Abdomen is flat  There is no distension  Palpations: Abdomen is soft  There is no mass  Hernia: There is no hernia in the left inguinal area or right inguinal area  Genitourinary:     Pubic Area: No rash  Labia:         Right: No rash, tenderness or lesion  Left: No rash, tenderness or lesion  Comments: Labia minor and clitoral ledesma with a hypertrophic appearance  Skin appears smooth, pale, mildly inflamed  Surrounding skin with smooth, pale, atrophic appearance  Introitus is atrophic and dry  No plaque-like lesions, no rashes, no thickening of skin  Lymphadenopathy:      Lower Body: No right inguinal adenopathy  No left inguinal adenopathy

## 2022-09-29 ENCOUNTER — OFFICE VISIT (OUTPATIENT)
Dept: OBGYN CLINIC | Facility: MEDICAL CENTER | Age: 82
End: 2022-09-29
Payer: COMMERCIAL

## 2022-09-29 VITALS
HEIGHT: 60 IN | SYSTOLIC BLOOD PRESSURE: 110 MMHG | BODY MASS INDEX: 19.24 KG/M2 | DIASTOLIC BLOOD PRESSURE: 60 MMHG | WEIGHT: 98 LBS

## 2022-09-29 DIAGNOSIS — L29.2 VULVAR ITCHING: Primary | ICD-10-CM

## 2022-09-29 PROCEDURE — 99213 OFFICE O/P EST LOW 20 MIN: CPT | Performed by: PHYSICIAN ASSISTANT

## 2022-09-29 RX ORDER — CLOBETASOL PROPIONATE 0.5 MG/G
OINTMENT TOPICAL
Qty: 30 G | Refills: 0 | Status: SHIPPED | OUTPATIENT
Start: 2022-09-29

## 2022-09-30 NOTE — PROGRESS NOTES
Assessment/Plan:    1  Vulvar itching  -advise continued use of twice weekly Estrace cream externally and at vaginal opening  Will try higher potency steroid cream for vulvar itching  Rx sent to pharmacy for clobetasol ointment  Patient was advised to use this once daily for 2 weeks then decrease down to 2 times daily  Will have patient return to office in 4-6 weeks for another follow-up visit  - clobetasol (TEMOVATE) 0 05 % ointment; Please apply nightly for two weeks  Then decrease to twice weekly  Dispense: 30 g; Refill: 0    RTO in 4-6 wks for follow up    Subjective:      Patient ID: Bridget Nino is a 80 y o  female  Pt presents today for a follow-up visit visit after being treated for atrophic vaginitis and vulvar itching  She used Estrace cream once daily for 2 weeks in the the in and Kenalog cream once daily for 2 weeks in the p m  She then decreased both creams to twice weekly use as instructed  Patient states she noted relief of her itching after completion of the 2 week course of daily Estrace and Kenalog  She reports after tapering down to twice weekly the nightly itching returned  Itching is relieved by use of the Kenalog at night  Denies any VB, discharge, odor, lesions, dysuria, hematuria, urgency, frequency, fever, chills, changes to bowel      The following portions of the patient's history were reviewed and updated as appropriate: allergies, current medications, past family history, past medical history, past social history, past surgical history and problem list     Review of Systems   Constitutional: Negative for chills, fatigue and fever  Respiratory: Negative for chest tightness and shortness of breath  Cardiovascular: Negative for chest pain  Gastrointestinal: Negative for abdominal pain, constipation and diarrhea     Genitourinary: Negative for difficulty urinating, dysuria, frequency, genital sores, hematuria, pelvic pain, urgency, vaginal bleeding, vaginal discharge and vaginal pain  Skin: Negative for rash  Objective:      /60 (BP Location: Left arm, Patient Position: Sitting, Cuff Size: Standard)   Ht 5' (1 524 m)   Wt 44 5 kg (98 lb)   BMI 19 14 kg/m²          Physical Exam  Abdominal:      General: Abdomen is flat  There is no distension  Palpations: Abdomen is soft  There is no mass  Hernia: There is no hernia in the left inguinal area or right inguinal area  Genitourinary:     Pubic Area: No rash  Labia:         Right: No rash, tenderness or lesion  Left: No rash, tenderness or lesion  Comments: Labia minor and clitoral ledesma with less of a hypertrophic appearance  Skin appears smooth, pale  No inflammation noted today  Surrounding skin with smooth, pale, atrophic appearance  Introitus is atrophic and dry  No plaque-like lesions, no rashes, no thickening of skin  Lymphadenopathy:      Lower Body: No right inguinal adenopathy  No left inguinal adenopathy

## 2022-10-27 ENCOUNTER — TELEPHONE (OUTPATIENT)
Dept: VASCULAR SURGERY | Facility: CLINIC | Age: 82
End: 2022-10-27

## 2022-10-27 NOTE — TELEPHONE ENCOUNTER
Attempted to contact patient to schedule appointment(s) listed below  Requested patient call [phone] option 3 to schedule appointment(s)  Patient's appointment(s) are due now        PER DR DUBON PATIENT DUE FOR CAROTID DOPPLER ONLY --NO OV NEEDED!!!    Dopplers  [] Abdominal Aorta Iliac (AOIL)  [x] Carotid (CV)   [] Celiac and/or Mesenteric  [] Endovascular Aortic Repair (EVAR)   [] Hemodialysis Access (HD)   [] Lower Limb Arterial (TRUDY)  [] Lower Limb Venous Duplex with Reflux (LEVDR)  [] Renal Artery  [] Upper Limb Arterial (UEA)    [] Upper Limb Venous (UEV)              [] KAMRON and Waveform analysis     Advanced Imaging   [] CTA head/neck    [] CTA abdomen    [] CTA abdomen & pelvis    [] CT abdomen with/ without contrast  [] CT abdomen with contrast  [] CT abdomen without contrast    [] CT abdomen & pelvis with/ without contrast  [] CT abdomen & pelvis with contrast  [] CT abdomen & pelvis without contrast    Office Visit   [] New patient, patient last seen over 3 years ago  [] Risk factor modification (RFM)   [] Follow up   [] Lost to follow up (LTFU)

## 2022-10-28 ENCOUNTER — TRANSCRIBE ORDERS (OUTPATIENT)
Dept: ADMINISTRATIVE | Facility: HOSPITAL | Age: 82
End: 2022-10-28

## 2022-10-28 DIAGNOSIS — I65.23 CAROTID STENOSIS, ASYMPTOMATIC, BILATERAL: Primary | ICD-10-CM

## 2022-11-08 ENCOUNTER — OFFICE VISIT (OUTPATIENT)
Dept: CARDIOLOGY CLINIC | Facility: MEDICAL CENTER | Age: 82
End: 2022-11-08

## 2022-11-08 VITALS
WEIGHT: 99.4 LBS | BODY MASS INDEX: 19.52 KG/M2 | OXYGEN SATURATION: 97 % | DIASTOLIC BLOOD PRESSURE: 60 MMHG | SYSTOLIC BLOOD PRESSURE: 124 MMHG | HEART RATE: 72 BPM | HEIGHT: 60 IN

## 2022-11-08 DIAGNOSIS — G25.0 TREMOR, ESSENTIAL: ICD-10-CM

## 2022-11-08 DIAGNOSIS — I77.9 BILATERAL CAROTID ARTERY DISEASE, UNSPECIFIED TYPE (HCC): ICD-10-CM

## 2022-11-08 DIAGNOSIS — E78.5 DYSLIPIDEMIA: Primary | ICD-10-CM

## 2022-11-08 DIAGNOSIS — I25.119 CORONARY ARTERY DISEASE INVOLVING NATIVE CORONARY ARTERY OF NATIVE HEART WITH ANGINA PECTORIS (HCC): ICD-10-CM

## 2022-11-08 DIAGNOSIS — I10 PRIMARY HYPERTENSION: ICD-10-CM

## 2022-11-08 DIAGNOSIS — N18.30 STAGE 3 CHRONIC KIDNEY DISEASE, UNSPECIFIED WHETHER STAGE 3A OR 3B CKD (HCC): ICD-10-CM

## 2022-11-08 RX ORDER — MIRTAZAPINE 15 MG/1
TABLET, FILM COATED ORAL
Qty: 90 TABLET | Refills: 1 | Status: SHIPPED | OUTPATIENT
Start: 2022-11-08

## 2022-11-08 RX ORDER — ASPIRIN 81 MG/1
81 TABLET ORAL DAILY
Qty: 90 TABLET | Refills: 3 | Status: SHIPPED | OUTPATIENT
Start: 2022-11-08

## 2022-11-08 NOTE — PROGRESS NOTES
Cardiology Follow Up    Amy Aguayo  1940  [id number]  [facility] [address] [id number] [phone]    1  Dyslipidemia     2  Coronary artery disease involving native coronary artery of native heart with angina pectoris ([address] )     3  Primary hypertension     4  Bilateral carotid artery disease, unspecified type ([address] )     5  Stage 3 chronic kidney disease, unspecified whether stage 3a or 3b CKD ([address] )         Diagnoses and all orders for this visit:    Dyslipidemia    Coronary artery disease involving native coronary artery of native heart with angina pectoris ([address] )    Primary hypertension    Bilateral carotid artery disease, unspecified type ([address] )    Stage 3 chronic kidney disease, unspecified whether stage 3a or 3b CKD ([address] )      I had the pleasure of seeing Amy Aguayo for a follow up visit  INTERVAL HISTORY: none    History of the presenting illness, Discussion/Summary and My Plan are as follows:::    Mirta Christopher is a pleasant 80year-old with hypertension, dyslipidemia, coronary artery disease and coronary artery bypass grafting in 2008, bilateral (Left at the time  of her bypass and subsequently right in [facility]) carotid endarterectomy, CKD with baseline creatinine around 1 2-1 3, who had followed with Dr Ena Ace but living close to [address], would like to transfer her care here  She does not have any symptoms  She walks for about 15 minutes in her basement daily  She uses a cane when she walks outside  No chest pains or shortness of breath or change in physical capacity  Weight has also been stable  Cardiac exam is unremarkable      ECG shows normal sinus rhythm      Plan:    Hypertension:  Controlled   she has CKD with baseline creatinine around 1 2-1 3, stable    Dyslipidemia:  Controlled, on a statin and Zetia with good control, no changes at this time    Coronary artery disease and prior CABG x 4-2008--Hallman-lad, SVG-diagonal 1, SVG-OM1, SVG-distal right coronary artery  , clinically stable, continue aspirin-can decrease dose to 81 mg, ARB, statin    History of bilateral carotid endarterectomy:  Not sure of this shows up on her history but she had carotid endarterectomy bilaterally, 1 at [facility], 1 in [facility], has an upcoming vascular ultrasound      Follow-up in 6 months   Latest Reference Range & Units 08/06/21 09:18 02/11/22 09:02   Cholesterol See Comment mg/dL 162 140   Triglycerides See Comment mg/dL 85 82   HDL >=50 mg/dL 85 65   Non-HDL Cholesterol mg/dl 77 75   LDL Calculated 0 - 100 mg/dL 60 59      Latest Reference Range & Units 02/11/22 09:02 03/23/22 09:30   BUN 5 - 25 mg/dL 32 (H) 20   Creatinine 0 60 - 1 30 mg/dL 1 52 (H) 1 25   (H): Data is abnormally high    Patient Active Problem List   Diagnosis   • Coronary artery disease involving native coronary artery of native heart with angina pectoris (HCC)   • Hypertension   • Dyslipidemia   • Bilateral carotid artery disease (HCC)   • Tremor, essential   • Protein-calorie malnutrition, unspecified severity ([facility])   • Stage 3 chronic kidney disease, unspecified whether stage 3a or 3b CKD ([facility])     Past Medical History:   Diagnosis Date   • Dyslipidemia    • Hypertension    • Tremor      Social History     Socioeconomic History   • Marital status: Single     Spouse name: Not on file   • Number of children: Not on file   • Years of education: Not on file   • Highest education level: Not on file   Occupational History   • Not on file   Tobacco Use   • Smoking status: Never Smoker   • Smokeless tobacco: Never Used   Vaping Use   • Vaping Use: Never used   Substance and Sexual Activity   • Alcohol use: Never   • Drug use: Never   • Sexual activity: Not on file   Other Topics Concern   • Not on file   Social History Narrative   • Not on file     Social Determinants of Health     Financial Resource Strain: Not on file   Food Insecurity: Not on file   Transportation Needs: Not on file   Physical Activity: Not on file   Stress: Not on file   Social Connections: Not on file   Intimate Partner Violence: Not on file   Housing Stability: Not on file      Family History   Problem Relation Age of Onset   • Coronary artery disease Mother      Past Surgical History:   Procedure Laterality Date   • CARDIAC SURGERY     • CAROTID ARTERY ANGIOPLASTY     • CLOSED MANIPULATION SHOULDER         Current Outpatient Medications:   •  amLODIPine (NORVASC) 5 mg tablet, Take 1 tablet (5 mg total) by mouth 2 (two) times a day, Disp: 180 tablet, Rfl: 1  •  aspirin 325 mg tablet, Take 325 mg by mouth daily, Disp: , Rfl:   •  atorvastatin (LIPITOR) 40 mg tablet, Take 1 tablet (40 mg total) by mouth daily, Disp: 90 tablet, Rfl: 1  •  clobetasol (TEMOVATE) 0 05 % ointment, Please apply nightly for two weeks  Then decrease to twice weekly  , Disp: 30 g, Rfl: 0  •  estradiol (ESTRACE VAGINAL) 0 1 mg/g vaginal cream, Apply pea sized amount to vulva once daily for two weeks in the morning  Then apply 2 times weekly, Disp: 42 5 g, Rfl: 0  •  ezetimibe (ZETIA) 10 mg tablet, Take 1 tablet (10 mg total) by mouth daily, Disp: 90 tablet, Rfl: 1  •  losartan (COZAAR) 50 mg tablet, Take 1 tablet (50 mg total) by mouth daily, Disp: 90 tablet, Rfl: 1  •  mirtazapine (REMERON) 15 mg tablet, TAKE 1 TABLET BY MOUTH DAILY AT BEDTIME, Disp: 90 tablet, Rfl: 1  •  nitroglycerin (NITROSTAT) 0 4 mg SL tablet, Place 1 tablet (0 4 mg total) under the tongue every 5 (five) minutes as needed for chest pain, Disp: 100 tablet, Rfl: 1  •  terconazole (TERAZOL 7) 0 4 % vaginal cream, Apply a small amount nightly externally  , Disp: 45 g, Rfl: 1  No Known Allergies    Imaging: No results found      Review of Systems:  Review of Systems    Physical Exam:  /60 (BP Location: Left arm, Patient Position: Sitting, Cuff Size: Adult)   Pulse 72   Ht 5' (1 524 m)   Wt 45 1 kg (99 lb 6 4 oz)   SpO2 97%   BMI 19 41 kg/m²   Physical Exam  Constitutional:       General: She is not in acute distress  Appearance: Normal appearance  She is not ill-appearing  HENT:      Nose: Nose normal  No congestion or rhinorrhea  Mouth/Throat:      Mouth: Mucous membranes are moist       Pharynx: Oropharynx is clear  No oropharyngeal exudate or posterior oropharyngeal erythema  Cardiovascular:      Rate and Rhythm: Normal rate and regular rhythm  Pulses: Normal pulses  Heart sounds: No murmur heard  No friction rub  Pulmonary:      Effort: Pulmonary effort is normal  No respiratory distress  Breath sounds: No stridor  No wheezing or rhonchi  Musculoskeletal:         General: No swelling, deformity or signs of injury  Normal range of motion  Cervical back: Normal range of motion  No rigidity or tenderness  Neurological:      Mental Status: She is alert  This note was completed in part utilizing [employer] direct voice recognition software  Grammatical errors, random word insertion, spelling mistakes, occasional wrong word or "sound-alike" substitutions and incomplete sentences may be an occasional consequence of the system secondary to software limitations, ambient noise and hardware issues  At the time of dictation, efforts were made to edit, clarify and /or correct errors  Please read the chart carefully and recognize, using context, where substitutions have occurred  If you have any questions or concerns about the context, text or information contained within the body of this dictation, please contact myself, the provider, for further clarification

## 2022-11-09 ENCOUNTER — HOSPITAL ENCOUNTER (OUTPATIENT)
Dept: RADIOLOGY | Facility: MEDICAL CENTER | Age: 82
Discharge: HOME | End: 2022-11-09

## 2022-11-09 DIAGNOSIS — I65.23 CAROTID STENOSIS, ASYMPTOMATIC, BILATERAL: ICD-10-CM

## 2022-11-14 DIAGNOSIS — I10 ESSENTIAL HYPERTENSION: ICD-10-CM

## 2022-11-15 RX ORDER — EZETIMIBE 10 MG/1
10 TABLET ORAL DAILY
Qty: 90 TABLET | Refills: 0 | Status: SHIPPED | OUTPATIENT
Start: 2022-11-15

## 2022-11-15 RX ORDER — LOSARTAN POTASSIUM 50 MG/1
50 TABLET ORAL DAILY
Qty: 90 TABLET | Refills: 0 | Status: SHIPPED | OUTPATIENT
Start: 2022-11-15

## 2022-11-17 ENCOUNTER — OFFICE VISIT (OUTPATIENT)
Dept: OBGYN CLINIC | Facility: MEDICAL CENTER | Age: 82
End: 2022-11-17

## 2022-11-17 VITALS — WEIGHT: 98.6 LBS | SYSTOLIC BLOOD PRESSURE: 150 MMHG | BODY MASS INDEX: 19.26 KG/M2 | DIASTOLIC BLOOD PRESSURE: 70 MMHG

## 2022-11-17 DIAGNOSIS — N95.2 ATROPHIC VAGINITIS: ICD-10-CM

## 2022-11-17 DIAGNOSIS — L29.2 VULVAR ITCHING: Primary | ICD-10-CM

## 2022-11-17 RX ORDER — ESTRADIOL 0.1 MG/G
CREAM VAGINAL
Qty: 42.5 G | Refills: 0 | Status: SHIPPED | OUTPATIENT
Start: 2022-11-17

## 2022-11-17 RX ORDER — CLOBETASOL PROPIONATE 0.5 MG/G
OINTMENT TOPICAL 2 TIMES WEEKLY
Qty: 30 G | Refills: 0 | Status: SHIPPED | OUTPATIENT
Start: 2022-11-17

## 2022-11-17 NOTE — PROGRESS NOTES
Assessment/Plan:    1  Vulvar itching  - continue to use clobetasol twice weekly for maintenance   - clobetasol (TEMOVATE) 0 05 % ointment; Apply topically 2 (two) times a week  Dispense: 30 g; Refill: 0    2  Atrophic vaginitis  -continue to use clobetasol twice weekly for maintenance   - estradiol (ESTRACE VAGINAL) 0 1 mg/g vaginal cream; Apply a pea sized amount to vulva twice weekly  Dispense: 42 5 g; Refill: 0      RTO for annual exam    Subjective:      Patient ID: Stephie Trinidad is a 80 y o  female  Pt presents today for a follow-up visit visit after being treated for atrophic vaginitis and vulvar itching  She has been using Estrace cream twice weekly for maintenance of atrophic vaginitis  She was started on a clobetasol taper after last follow up visit due to persistent itching  She was advised to use this once daily for two weeks then taper to twice weekly  She reports her symptoms has improved and mostly resolved with this treatment  Denies any VB, discharge, odor, lesions, dysuria, hematuria, urgency, frequency, fever, chills, changes to bowel      The following portions of the patient's history were reviewed and updated as appropriate: allergies, current medications, past family history, past medical history, past social history, past surgical history and problem list     Review of Systems   Constitutional: Negative for chills, fatigue and fever  Respiratory: Negative for chest tightness and shortness of breath  Cardiovascular: Negative for chest pain  Gastrointestinal: Negative for abdominal pain, constipation and diarrhea  Genitourinary: Negative for difficulty urinating, dysuria, frequency, genital sores, hematuria, pelvic pain, urgency, vaginal bleeding, vaginal discharge and vaginal pain  Skin: Negative for rash           Objective:      /70 (BP Location: Left arm, Patient Position: Sitting, Cuff Size: Standard)   Wt 44 7 kg (98 lb 9 6 oz)   BMI 19 26 kg/m²     BP elevated today but pt's sister states pt did not take her blood pressure medication today     Physical Exam  Abdominal:      General: Abdomen is flat  There is no distension  Palpations: Abdomen is soft  There is no mass  Hernia: There is no hernia in the left inguinal area or right inguinal area  Genitourinary:     Pubic Area: No rash  Labia:         Right: No rash, tenderness or lesion  Left: No rash, tenderness or lesion  Comments: Labia minor and clitoral ledesma with less of a hypertrophic appearance  Skin appears smooth, pale  No inflammation noted today  Surrounding skin with smooth, pale, atrophic appearance  Introitus is atrophic and dry  No plaque-like lesions, no rashes, no thickening of skin  Lymphadenopathy:      Lower Body: No right inguinal adenopathy  No left inguinal adenopathy

## 2022-12-02 ENCOUNTER — RA CDI HCC (OUTPATIENT)
Dept: OTHER | Facility: HOSPITAL | Age: 82
End: 2022-12-02

## 2022-12-02 NOTE — PROGRESS NOTES
[facility]  coding opportunities       Chart reviewed, no opportunity found: CHART REVIEWED, NO OPPORTUNITY FOUND        Patients Insurance     Medicare Insurance: [facility] Ad[employer]

## 2022-12-07 ENCOUNTER — OFFICE VISIT (OUTPATIENT)
Dept: VASCULAR SURGERY | Facility: CLINIC | Age: 82
End: 2022-12-07

## 2022-12-07 VITALS
BODY MASS INDEX: 19.24 KG/M2 | WEIGHT: 98 LBS | HEART RATE: 64 BPM | SYSTOLIC BLOOD PRESSURE: 130 MMHG | DIASTOLIC BLOOD PRESSURE: 60 MMHG | HEIGHT: 60 IN

## 2022-12-07 DIAGNOSIS — I65.23 BILATERAL CAROTID ARTERY STENOSIS: Primary | ICD-10-CM

## 2022-12-07 NOTE — PATIENT INSTRUCTIONS
Carotid duplex in 6 months with return office visit in 1 year  Continue aspirin  Continue statin  Call 911 or go to the ED with TIA or strokelike symptoms that we discussed in office today  Call return with any questions or concerns    Carotid Artery Disease   AMBULATORY CARE:   Carotid artery disease (CAD)  means the major blood vessels in your neck are narrowed or becoming blocked  These 2 major blood vessels are called the carotid arteries  They supply your brain with blood  The narrow or blocked blood vessels increase your risk for a stroke  CAD is also called carotid artery stenosis  Have someone call your local emergency number (911 in the [address] [facility] [facility]) if:   You have any of the following signs of a stroke:      Numbness or drooping on one side of your face     Weakness in an arm or leg    Confusion or difficulty speaking    Dizziness, a severe headache, or vision loss    You have any of the following signs of a heart attack:      Squeezing, pressure, or pain in your chest    You may  also have any of the following:     Discomfort or pain in your back, neck, jaw, stomach, or arm    Shortness of breath    Nausea or vomiting    Lightheadedness or a sudden cold sweat    Call your doctor if:   You have questions or concerns about your condition or care  Common signs and symptoms:  CAD develops slowly  You may have no signs or symptoms until you have a mini-stroke, or transient ischemic attack (TIA)  A TIA is a temporary lack of blood flow to your brain  A TIA goes away quickly and does not cause permanent damage  A TIA may be a warning sign that you are about to have a stroke  If you have any symptoms of a TIA or stroke, seek care immediately  Warning signs of a stroke:   The words BE FAST can help you remember and recognize warning signs of a stroke:  B = Balance:  Sudden loss of balance    E = Eyes:  Loss of vision in one or both eyes    F = Face:  Face droops on one side    A = Arms:  Arm drops when both arms are raised    S = Speech:  Speech is slurred or sounds different    T = Time:  Time to get help immediately       Treatment  depends on how narrow your arteries have become  Treatment also depends on your symptoms and your general health  The goal of treatment is to lower your risk for a stroke  You may need any of the following:  Medicines:      Aspirin,  or other blood thinner, may be recommended  These will help prevent blood clots from forming in your carotid arteries  If your healthcare provider wants you to take aspirin, do not take acetaminophen or ibuprofen instead  Cholesterol medicine  lowers your cholesterol level  Blood pressure medicine  lowers or helps control your blood pressure  Procedures  can help open blocked arteries:     Carotid endarterectomy (CEA)  is used to cut and remove plaque buildup from your arteries  Carotid angioplasty and stenting (ILIA)  is used to push the plaque against the artery wall with a balloon device  Then, a stent is placed to keep the artery open  A stent is a small metal mesh tube  Prevent a stroke:   Do not smoke, and avoid secondhand smoke  Nicotine and other chemicals in cigarettes and cigars increase your risk for a stroke  Ask your healthcare provider for information if you currently smoke and need help to quit  E-cigarettes or smokeless tobacco still contain nicotine  Talk to your healthcare provider before you use these products  Eat a variety of healthy foods  Healthy foods include fruit, vegetables, whole-grain breads, low-fat dairy products, chicken, and fish  Choose fish that are high in omega-3 fatty acids, such as salmon and fresh tuna  Ask your healthcare provider for more information on a heart healthy diet and the DASH eating plan  Limit sodium (salt)  Sodium may increase your blood pressure  Add less table salt to your foods  Read food labels and choose foods that are low in sodium   Your healthcare provider may suggest you follow a low sodium diet  Reach or maintain a healthy weight  Extra weight makes your heart work harder  Ask your healthcare provider what a healthy weight is for you  He or she can help you create a safe weight loss plan  Even a weight loss of 10% of your extra body weight can help your heart function better  Exercise regularly  Exercise helps improve heart function and can help you manage your weight  Exercise can also help lower your cholesterol and blood sugar levels  Try to get at least 30 minutes of exercise 5 times each week  Try to be physically active every day  This may include walking, riding a bicycle, or swimming  Your healthcare provider can help you create an exercise plan that works best for you  Limit alcohol  Alcohol can increase your blood pressure and triglyceride levels  A drink of alcohol is 12 ounces of beer, 5 ounces of wine, or 1½ ounces of liquor  Follow up with your doctor as directed:  Write down your questions so you remember to ask them during your visits  © [employer] 2022 Information is for End User's use only and may not be sold, redistributed or otherwise used for commercial purposes  All illustrations and images included in CareNotes® are the copyrighted property of [employer]  or [address] [place], [place] [address]drea Rehman   The above information is an  only  It is not intended as medical advice for individual conditions or treatments  Talk to your doctor, nurse or pharmacist before following any medical regimen to see if it is safe and effective for you

## 2022-12-07 NOTE — PROGRESS NOTES
Assessment/Plan:    Bilateral carotid artery disease [facility])  19-year-old female with HTN, HLD, CAD s/p CABG '08, CKD 3, tremors, bilateral carotid artery stenosis s/p remote history of bilateral endarterectomy presents to review imaging and [facility]  -Patient presents accompanied by her sister     - She is without complaints, denies TIA or strokelike symptoms  Neuro exam intact  -Carotid duplex imaging 11/9/22 reviewed  RIGHT:  There is 50-69% stenosis noted in the internal carotid artery  Plaque is  heterogenous and irregular  There is a severe stenosis in the external carotid artery  Vertebral artery flow is antegrade  There is no significant subclavian artery disease  Velocities 242/45 ratio 2 88    LEFT: Widely patent internal carotid artery and endarterectomy site  Vertebral artery flow is antegrade  There is no significant subclavian artery disease  Plan:  -Carotid duplex in 6 months with return office visit in 1 year  -Continue aspirin  -Continue statin  -Educated on TIA and strokelike symptoms and when to call 911 or go to the ED  -Call or return to office with new or worsening symptoms questions or concerns    Hypertension  -BP well controlled  -continue current medical regimen  -management per PCP      Dyslipidemia  -stable  -continue statin therapy  -management per PCP         Diagnoses and all orders for this visit:    Bilateral carotid artery stenosis  -     VAS carotid complete study; Future          Subjective:      Patient ID: Cortney Cea is a 80 y o  female  Patient presents today to review carotid duplex done 11/9  Denies any s/s of CVA  Patient also has prior h/o L CEA in 2008  HPI  See assessment and plan    19-year-old female with remote history of bilateral carotid endarterectomy presents to review imaging and [facility]  She is without complaints  Denies TIA or strokelike symptoms  Neuro exam intact      Carotid duplex imaging reviewed demonstrating moderate right ICA stenosis, and widely patent left CEA site  Patient is on optimal medical management with daily aspirin and statin therapies  Will continue duplex surveillance in 6 months and have patient return to office in 1 year  Reviewed with patient and her sister home with she is accompanied by today, TIA and strokelike symptoms and when to call 911 or go to the ED  The following portions of the patient's history were reviewed and updated as appropriate: allergies, current medications, past family history, past medical history, past social history, past surgical history and problem list     Review of Systems   Constitutional: Negative  HENT: Negative  Eyes: Negative  Respiratory: Negative  Cardiovascular: Negative  Gastrointestinal: Negative  Endocrine: Negative  Genitourinary: Negative  Musculoskeletal: Positive for gait problem  Skin: Negative  Allergic/Immunologic: Negative  Neurological: Positive for weakness  Hematological: Negative  Psychiatric/Behavioral: Negative  I have reviewed and made appropriate changes to the review of systems input by the medical assistant  Objective:      /60 (BP Location: Right arm, Patient Position: Sitting)   Pulse 64   Ht 5' (1 524 m)   Wt 44 5 kg (98 lb)   BMI 19 14 kg/m²          Physical Exam  Vitals reviewed  Constitutional:       General: She is not in acute distress  Appearance: Normal appearance  She is not ill-appearing  HENT:      Head: Normocephalic and atraumatic  Nose: Nose normal       Mouth/Throat:      Comments: Hoarse voice  Neck:      Vascular: No carotid bruit  Cardiovascular:      Rate and Rhythm: Normal rate  Pulses: Normal pulses  Carotid pulses are 2+ on the right side and 2+ on the left side  Radial pulses are 2+ on the right side and 2+ on the left side  Heart sounds: No murmur heard  Pulmonary:      Effort: Pulmonary effort is normal  No respiratory distress  Breath sounds: Normal breath sounds  Musculoskeletal:         General: Normal range of motion  Cervical back: Normal range of motion and neck supple  Right lower leg: No edema  Left lower leg: No edema  Comments: Walks with cane   Skin:     General: Skin is warm  Neurological:      General: No focal deficit present  Mental Status: She is alert and oriented to person, place, and time  Sensory: No sensory deficit  Motor: No weakness        Coordination: Coordination normal    Psychiatric:         Behavior: Behavior normal              Vitals:    12/07/22 1303   BP: 130/60   BP Location: Right arm   Patient Position: Sitting   Pulse: 64   Weight: 44 5 kg (98 lb)   Height: 5' (1 524 m)       Patient Active Problem List   Diagnosis   • Coronary artery disease involving native coronary artery of native heart with angina pectoris ([facility])   • Hypertension   • Dyslipidemia   • Bilateral carotid artery disease ([facility])   • Tremor, essential   • Protein-calorie malnutrition, unspecified severity ([facility])   • Stage 3 chronic kidney disease, unspecified whether stage 3a or 3b CKD ([facility])       Past Surgical History:   Procedure Laterality Date   • CARDIAC SURGERY     • CAROTID ARTERY ANGIOPLASTY     • CLOSED MANIPULATION SHOULDER         Family History   Problem Relation Age of Onset   • Coronary artery disease Mother        Social History     Socioeconomic History   • Marital status: Single     Spouse name: Not on file   • Number of children: Not on file   • Years of education: Not on file   • Highest education level: Not on file   Occupational History   • Not on file   Tobacco Use   • Smoking status: Never   • Smokeless tobacco: Never   Vaping Use   • Vaping Use: Never used   Substance and Sexual Activity   • Alcohol use: Never   • Drug use: Never   • Sexual activity: Not on file   Other Topics Concern   • Not on file   Social History Narrative   • Not on file     Social Determinants of Health Financial Resource Strain: Not on file   Food Insecurity: Not on file   Transportation Needs: Not on file   Physical Activity: Not on file   Stress: Not on file   Social Connections: Not on file   Intimate Partner Violence: Not on file   Housing Stability: Not on file       No Known Allergies      Current Outpatient Medications:   •  amLODIPine (NORVASC) 5 mg tablet, Take 1 tablet (5 mg total) by mouth 2 (two) times a day, Disp: 180 tablet, Rfl: 1  •  aspirin (ECOTRIN LOW STRENGTH) 81 mg EC tablet, Take 1 tablet (81 mg total) by mouth daily, Disp: 90 tablet, Rfl: 3  •  atorvastatin (LIPITOR) 40 mg tablet, Take 1 tablet (40 mg total) by mouth daily, Disp: 90 tablet, Rfl: 1  •  clobetasol (TEMOVATE) 0 05 % ointment, Apply topically 2 (two) times a week, Disp: 30 g, Rfl: 0  •  estradiol (ESTRACE VAGINAL) 0 1 mg/g vaginal cream, Apply a pea sized amount to vulva twice weekly, Disp: 42 5 g, Rfl: 0  •  ezetimibe (ZETIA) 10 mg tablet, Take 1 tablet (10 mg total) by mouth daily, Disp: 90 tablet, Rfl: 0  •  losartan (COZAAR) 50 mg tablet, Take 1 tablet (50 mg total) by mouth daily, Disp: 90 tablet, Rfl: 0  •  mirtazapine (REMERON) 15 mg tablet, TAKE 1 TABLET BY MOUTH DAILY AT BEDTIME, Disp: 90 tablet, Rfl: 1  •  nitroglycerin (NITROSTAT) 0 4 mg SL tablet, Place 1 tablet (0 4 mg total) under the tongue every 5 (five) minutes as needed for chest pain, Disp: 100 tablet, Rfl: 1  •  terconazole (TERAZOL 7) 0 4 % vaginal cream, Apply a small amount nightly externally  , Disp: 45 g, Rfl: 1

## 2022-12-07 NOTE — ASSESSMENT & PLAN NOTE
80-year-old female with HTN, HLD, CAD s/p CABG '08, CKD 3, tremors, bilateral carotid artery stenosis s/p remote history of bilateral endarterectomy presents to review imaging and [facility]  -Patient presents accompanied by her sister     - She is without complaints, denies TIA or strokelike symptoms  Neuro exam intact  -Carotid duplex imaging 11/9/22 reviewed  RIGHT:  There is 50-69% stenosis noted in the internal carotid artery  Plaque is  heterogenous and irregular  There is a severe stenosis in the external carotid artery  Vertebral artery flow is antegrade  There is no significant subclavian artery disease  Velocities 242/45 ratio 2 88    LEFT: Widely patent internal carotid artery and endarterectomy site  Vertebral artery flow is antegrade  There is no significant subclavian artery disease          Plan:  -Carotid duplex in 6 months with return office visit in 1 year  -Continue aspirin  -Continue statin  -Educated on TIA and strokelike symptoms and when to call 911 or go to the ED  -Call or return to office with new or worsening symptoms questions or concerns

## 2022-12-15 ENCOUNTER — OFFICE VISIT (OUTPATIENT)
Dept: FAMILY MEDICINE CLINIC | Facility: CLINIC | Age: 82
End: 2022-12-15

## 2022-12-15 VITALS
DIASTOLIC BLOOD PRESSURE: 66 MMHG | BODY MASS INDEX: 19.83 KG/M2 | TEMPERATURE: 97.6 F | SYSTOLIC BLOOD PRESSURE: 136 MMHG | WEIGHT: 101 LBS | OXYGEN SATURATION: 97 % | HEART RATE: 70 BPM | HEIGHT: 60 IN

## 2022-12-15 DIAGNOSIS — N18.30 STAGE 3 CHRONIC KIDNEY DISEASE, UNSPECIFIED WHETHER STAGE 3A OR 3B CKD (HCC): ICD-10-CM

## 2022-12-15 DIAGNOSIS — E78.5 DYSLIPIDEMIA: ICD-10-CM

## 2022-12-15 DIAGNOSIS — I10 ESSENTIAL HYPERTENSION: ICD-10-CM

## 2022-12-15 DIAGNOSIS — G25.0 TREMOR, ESSENTIAL: ICD-10-CM

## 2022-12-15 DIAGNOSIS — E78.5 HYPERLIPIDEMIA, UNSPECIFIED HYPERLIPIDEMIA TYPE: ICD-10-CM

## 2022-12-15 DIAGNOSIS — I10 PRIMARY HYPERTENSION: ICD-10-CM

## 2022-12-15 DIAGNOSIS — I77.9 BILATERAL CAROTID ARTERY DISEASE, UNSPECIFIED TYPE (HCC): ICD-10-CM

## 2022-12-15 DIAGNOSIS — I25.119 CORONARY ARTERY DISEASE INVOLVING NATIVE CORONARY ARTERY OF NATIVE HEART WITH ANGINA PECTORIS (HCC): Primary | ICD-10-CM

## 2022-12-15 DIAGNOSIS — E46 PROTEIN-CALORIE MALNUTRITION, UNSPECIFIED SEVERITY (HCC): ICD-10-CM

## 2022-12-15 RX ORDER — ATORVASTATIN CALCIUM 40 MG/1
40 TABLET, FILM COATED ORAL DAILY
Qty: 90 TABLET | Refills: 1 | Status: SHIPPED | OUTPATIENT
Start: 2022-12-15

## 2022-12-15 RX ORDER — MIRTAZAPINE 15 MG/1
15 TABLET, FILM COATED ORAL
Qty: 90 TABLET | Refills: 1 | Status: SHIPPED | OUTPATIENT
Start: 2022-12-15

## 2022-12-15 RX ORDER — LOSARTAN POTASSIUM 50 MG/1
50 TABLET ORAL DAILY
Qty: 90 TABLET | Refills: 0 | Status: SHIPPED | OUTPATIENT
Start: 2022-12-15

## 2022-12-15 RX ORDER — AMLODIPINE BESYLATE 5 MG/1
5 TABLET ORAL 2 TIMES DAILY
Qty: 180 TABLET | Refills: 1 | Status: SHIPPED | OUTPATIENT
Start: 2022-12-15

## 2022-12-15 RX ORDER — EZETIMIBE 10 MG/1
10 TABLET ORAL DAILY
Qty: 90 TABLET | Refills: 0 | Status: SHIPPED | OUTPATIENT
Start: 2022-12-15

## 2022-12-15 NOTE — PROGRESS NOTES
Assessment/Plan:     Diagnoses and all orders for this visit:    Coronary artery disease involving native coronary artery of native heart with angina pectoris ([facility] )  -     Comprehensive metabolic panel    Primary hypertension  -     Lipid panel    Stage 3 chronic kidney disease, unspecified whether stage 3a or 3b CKD ([facility])    Protein-calorie malnutrition, unspecified severity ([facility])    Dyslipidemia    Tremor, essential  -     mirtazapine (REMERON) 15 mg tablet; Take 1 tablet (15 mg total) by mouth daily at bedtime    Essential hypertension  Comments:  rto 6 month recheck  Orders:  -     ezetimibe (ZETIA) 10 mg tablet; Take 1 tablet (10 mg total) by mouth daily  -     amLODIPine (NORVASC) 5 mg tablet; Take 1 tablet (5 mg total) by mouth 2 (two) times a day  -     losartan (COZAAR) 50 mg tablet; Take 1 tablet (50 mg total) by mouth daily    Hyperlipidemia, unspecified hyperlipidemia type  Comments:  on statin due for lab  Orders:  -     atorvastatin (LIPITOR) 40 mg tablet; Take 1 tablet (40 mg total) by mouth daily    Tremor, essential  Comments:  on remeron  Orders:  -     mirtazapine (REMERON) 15 mg tablet; Take 1 tablet (15 mg total) by mouth daily at bedtime    Bilateral carotid artery disease, unspecified type ([facility] )          Subjective:      Patient ID: Bridget Nino is a 80 y o  female  Patient presents in the office for follow-up chronic conditions  He has bilateral rotted disease  She does follow with cardiology  History of coronary artery disease on a baby aspirin  He also has hypertension with chronic kidney disease stage III  This is well controlled with the amlodipine 5 mg twice daily and losartan 50 mg  Hyperlipidemia she is on atorvastatin 40 mg and Zetia 10  She is due for routine labs  Getting Remeron 15 mg at night for essential tremor  Does not appear to be working good however patient is sleeping well will continue    Vaccine declined today      The following portions of the patient's history were reviewed and updated as appropriate:   She   Patient Active Problem List    Diagnosis Date Noted   • Stage 3 chronic kidney disease, unspecified whether stage 3a or 3b CKD ([place] [address] ) 02/08/2022   • Protein-calorie malnutrition, unspecified severity ([place] [address] ) 11/18/2021   • Tremor, essential 04/01/2021   • Bilateral carotid artery disease ([place] [address] ) 01/25/2021   • Hypertension    • Dyslipidemia    • Coronary artery disease involving native coronary artery of native heart with angina pectoris ([place] [address] ) 11/24/2020     Current Outpatient Medications   Medication Sig Dispense Refill   • amLODIPine (NORVASC) 5 mg tablet Take 1 tablet (5 mg total) by mouth 2 (two) times a day 180 tablet 1   • aspirin (ECOTRIN LOW STRENGTH) 81 mg EC tablet Take 1 tablet (81 mg total) by mouth daily 90 tablet 3   • atorvastatin (LIPITOR) 40 mg tablet Take 1 tablet (40 mg total) by mouth daily 90 tablet 1   • clobetasol (TEMOVATE) 0 05 % ointment Apply topically 2 (two) times a week 30 g 0   • estradiol (ESTRACE VAGINAL) 0 1 mg/g vaginal cream Apply a pea sized amount to vulva twice weekly 42 5 g 0   • ezetimibe (ZETIA) 10 mg tablet Take 1 tablet (10 mg total) by mouth daily 90 tablet 0   • losartan (COZAAR) 50 mg tablet Take 1 tablet (50 mg total) by mouth daily 90 tablet 0   • mirtazapine (REMERON) 15 mg tablet Take 1 tablet (15 mg total) by mouth daily at bedtime 90 tablet 1   • nitroglycerin (NITROSTAT) 0 4 mg SL tablet Place 1 tablet (0 4 mg total) under the tongue every 5 (five) minutes as needed for chest pain 100 tablet 1     No current facility-administered medications for this visit  She has No Known Allergies       Review of Systems   Constitutional: Negative for activity change and unexpected weight change  HENT: Negative for ear pain and sore throat  Eyes: Negative for visual disturbance  Respiratory: Negative for cough, shortness of breath and wheezing  Cardiovascular: Negative for chest pain and leg swelling  Gastrointestinal: Negative for abdominal pain, blood in stool, constipation, diarrhea, nausea and vomiting  Genitourinary: Negative for difficulty urinating  Musculoskeletal: Negative for arthralgias and myalgias  Skin: Negative for rash  Neurological: Negative for dizziness, syncope, light-headedness and headaches  Psychiatric/Behavioral: Negative for self-injury, sleep disturbance and suicidal ideas  The patient is not nervous/anxious  Objective:        Physical Exam  Vitals and nursing note reviewed  Constitutional:       General: She is not in acute distress  Appearance: She is well-developed  She is not diaphoretic  Comments: Thin  White  Female,  Ambulates  With  walker   HENT:      Head: Normocephalic and atraumatic  Right Ear: External ear normal  There is impacted cerumen  Left Ear: External ear normal  There is impacted cerumen  Eyes:      Conjunctiva/sclera: Conjunctivae normal       Pupils: Pupils are equal, round, and reactive to light  Neck:      Thyroid: No thyromegaly  Vascular: Carotid bruit present  Comments: right  Cardiovascular:      Rate and Rhythm: Normal rate and regular rhythm  Heart sounds: Murmur heard  Systolic murmur is present with a grade of 2/6  No friction rub  No gallop  Comments: Left   Sternal  border  Pulmonary:      Effort: Pulmonary effort is normal  No respiratory distress  Breath sounds: Normal breath sounds  No wheezing  Abdominal:      General: Bowel sounds are normal  There is no distension  Palpations: Abdomen is soft  There is no mass  Tenderness: There is no abdominal tenderness  Musculoskeletal:      Right lower leg: No edema  Left lower leg: No edema  Lymphadenopathy:      Cervical: No cervical adenopathy  Skin:     General: Skin is warm and dry  Findings: No erythema or rash  Neurological:      General: No focal deficit present        Mental Status: She is alert and oriented to person, place, and time  Motor: Tremor present  Psychiatric:         Mood and Affect: Mood normal          Behavior: Behavior normal          Thought Content:  Thought content normal          Judgment: Judgment normal

## 2023-02-02 DIAGNOSIS — I10 ESSENTIAL HYPERTENSION: ICD-10-CM

## 2023-02-03 RX ORDER — EZETIMIBE 10 MG/1
TABLET ORAL
Qty: 90 TABLET | Refills: 0 | Status: SHIPPED | OUTPATIENT
Start: 2023-02-03

## 2023-02-25 DIAGNOSIS — I10 ESSENTIAL HYPERTENSION: ICD-10-CM

## 2023-02-27 RX ORDER — LOSARTAN POTASSIUM 50 MG/1
TABLET ORAL
Qty: 90 TABLET | Refills: 0 | Status: SHIPPED | OUTPATIENT
Start: 2023-02-27

## 2023-04-04 ENCOUNTER — APPOINTMENT (OUTPATIENT)
Dept: LAB | Facility: MEDICAL CENTER | Age: 83
End: 2023-04-04

## 2023-04-04 LAB
ALBUMIN SERPL BCP-MCNC: 3.9 G/DL (ref 3.5–5)
ALP SERPL-CCNC: 86 U/L (ref 46–116)
ALT SERPL W P-5'-P-CCNC: 39 U/L (ref 12–78)
ANION GAP SERPL CALCULATED.3IONS-SCNC: 3 MMOL/L (ref 4–13)
AST SERPL W P-5'-P-CCNC: 43 U/L (ref 5–45)
BILIRUB SERPL-MCNC: 0.55 MG/DL (ref 0.2–1)
BUN SERPL-MCNC: 24 MG/DL (ref 5–25)
CALCIUM SERPL-MCNC: 10 MG/DL (ref 8.3–10.1)
CHLORIDE SERPL-SCNC: 106 MMOL/L (ref 96–108)
CHOLEST SERPL-MCNC: 137 MG/DL
CO2 SERPL-SCNC: 30 MMOL/L (ref 21–32)
CREAT SERPL-MCNC: 1.31 MG/DL (ref 0.6–1.3)
GFR SERPL CREATININE-BSD FRML MDRD: 37 ML/MIN/1.73SQ M
GLUCOSE P FAST SERPL-MCNC: 103 MG/DL (ref 65–99)
HDLC SERPL-MCNC: 65 MG/DL
LDLC SERPL CALC-MCNC: 62 MG/DL (ref 0–100)
NONHDLC SERPL-MCNC: 72 MG/DL
POTASSIUM SERPL-SCNC: 4.1 MMOL/L (ref 3.5–5.3)
PROT SERPL-MCNC: 7.3 G/DL (ref 6.4–8.4)
SODIUM SERPL-SCNC: 139 MMOL/L (ref 135–147)
TRIGL SERPL-MCNC: 51 MG/DL

## 2023-06-06 ENCOUNTER — TELEPHONE (OUTPATIENT)
Dept: OBGYN CLINIC | Facility: CLINIC | Age: 83
End: 2023-06-06

## 2023-06-06 DIAGNOSIS — N95.2 ATROPHIC VAGINITIS: ICD-10-CM

## 2023-06-06 DIAGNOSIS — L29.2 VULVAR ITCHING: ICD-10-CM

## 2023-06-07 RX ORDER — ESTRADIOL 0.1 MG/G
CREAM VAGINAL
Qty: 42.5 G | Refills: 0 | Status: SHIPPED | OUTPATIENT
Start: 2023-06-07

## 2023-06-07 RX ORDER — CLOBETASOL PROPIONATE 0.5 MG/G
OINTMENT TOPICAL 2 TIMES WEEKLY
Qty: 30 G | Refills: 0 | Status: SHIPPED | OUTPATIENT
Start: 2023-06-08

## 2023-06-13 ENCOUNTER — OFFICE VISIT (OUTPATIENT)
Dept: CARDIOLOGY CLINIC | Facility: MEDICAL CENTER | Age: 83
End: 2023-06-13
Payer: COMMERCIAL

## 2023-06-13 VITALS
HEIGHT: 60 IN | DIASTOLIC BLOOD PRESSURE: 60 MMHG | SYSTOLIC BLOOD PRESSURE: 114 MMHG | OXYGEN SATURATION: 95 % | HEART RATE: 84 BPM | WEIGHT: 103 LBS | BODY MASS INDEX: 20.22 KG/M2

## 2023-06-13 DIAGNOSIS — I25.119 CORONARY ARTERY DISEASE INVOLVING NATIVE CORONARY ARTERY OF NATIVE HEART WITH ANGINA PECTORIS (HCC): ICD-10-CM

## 2023-06-13 DIAGNOSIS — E78.5 DYSLIPIDEMIA: ICD-10-CM

## 2023-06-13 DIAGNOSIS — I10 HYPERTENSION, UNSPECIFIED TYPE: Primary | ICD-10-CM

## 2023-06-13 PROCEDURE — 99214 OFFICE O/P EST MOD 30 MIN: CPT | Performed by: INTERNAL MEDICINE

## 2023-06-13 NOTE — PROGRESS NOTES
Cardiology Follow Up    Misael Emery  1940  [id number]  [facility] NORA Cobb [address] [place] [id number] [phone]    No diagnosis found  There are no diagnoses linked to this encounter  I had the pleasure of seeing Misael Emery for a follow up visit  INTERVAL HISTORY: none    History of the presenting illness, Discussion/Summary and My Plan are as follows:::    Maryann Gardner is a pleasant 59-year-old with hypertension, dyslipidemia, coronary artery disease and coronary artery bypass grafting in 2008, bilateral (Left at the time  of her coronary bypass and subsequently right in [facility]) carotid endarterectomy, CKD with baseline creatinine around 1 2-1 3, who now follows between Office since she lives close to here       She does not have any symptoms  She walks for about 15 minutes in her basement daily  She uses a cane when she walks outside  No chest pains or shortness of breath or change in physical capacity  Weight has also been stable  Cardiac exam is unremarkable  ECG showed normal sinus rhythm      Plan:    Hypertension:  Controlled   she has CKD with baseline creatinine around 1 2-1 3, stable-April 2023    Dyslipidemia:  Controlled, on a statin and Zetia with good control, no changes at this time    Coronary artery disease and prior CABG x 4-2008--Hallman-lad, SVG-diagonal 1, SVG-OM1, SVG-distal right coronary artery  , clinically stable, continue aspirin-can decrease dose to 81 mg, ARB, statin    History of bilateral carotid endarterectomy:  Not sure of this shows up on her history but she had carotid endarterectomy bilaterally, 1 at [facility], 1 in [facility], has an upcoming vascular ultrasound  Last 1 in November showed patent left and 50-69% right carotid disease, seeing vascular as well      Follow-up in 6 months     Latest Reference Range & Units 04/04/23 08:59   Cholesterol See Comment mg/dL 137   Triglycerides See Comment mg/dL 51   HDL >=50 mg/dL 65   Non-HDL Cholesterol mg/dl 72   LDL Calculated 0 - 100 mg/dL 62      Latest Reference Range & Units 08/06/21 09:18 02/11/22 09:02   Cholesterol See Comment mg/dL 162 140   Triglycerides See Comment mg/dL 85 82   HDL >=50 mg/dL 85 65   Non-HDL Cholesterol mg/dl 77 75   LDL Calculated 0 - 100 mg/dL 60 59      Latest Reference Range & Units 04/04/23 08:59   BUN 5 - 25 mg/dL 24   Creatinine 0 60 - 1 30 mg/dL 1 31 (H)   (H): Data is abnormally high    Patient Active Problem List   Diagnosis   • Coronary artery disease involving native coronary artery of native heart with angina pectoris ([facility])   • Hypertension   • Dyslipidemia   • Bilateral carotid artery disease ([facility])   • Tremor, essential   • Protein-calorie malnutrition, unspecified severity ([facility])   • Stage 3 chronic kidney disease, unspecified whether stage 3a or 3b CKD ([facility])     Past Medical History:   Diagnosis Date   • Dyslipidemia    • Hypertension    • Tremor      Social History     Socioeconomic History   • Marital status: Single     Spouse name: Not on file   • Number of children: Not on file   • Years of education: Not on file   • Highest education level: Not on file   Occupational History   • Not on file   Tobacco Use   • Smoking status: Never   • Smokeless tobacco: Never   Vaping Use   • Vaping Use: Never used   Substance and Sexual Activity   • Alcohol use: Never   • Drug use: Never   • Sexual activity: Not on file   Other Topics Concern   • Not on file   Social History Narrative   • Not on file     Social Determinants of Health     Financial Resource Strain: Low Risk  (4/17/2023)    Overall Financial Resource Strain ([facility])    • Difficulty of Paying Living Expenses: Not hard at all   Food Insecurity: Not on file   Transportation Needs: No Transportation Needs (4/17/2023)    PRAPARE - Transportation    • Lack of Transportation (Medical):  No    • Lack of Transportation (Non-Medical): No   Physical Activity: Not on file   Stress: Not on file   Social Connections: Not on file   Intimate Partner Violence: Not on file   Housing Stability: Not on file      Family History   Problem Relation Age of Onset   • Coronary artery disease Mother      Past Surgical History:   Procedure Laterality Date   • CARDIAC SURGERY     • CAROTID ARTERY ANGIOPLASTY     • CLOSED MANIPULATION SHOULDER         Current Outpatient Medications:   •  amLODIPine (NORVASC) 5 mg tablet, Take 1 tablet (5 mg total) by mouth 2 (two) times a day, Disp: 180 tablet, Rfl: 1  •  aspirin (ECOTRIN LOW STRENGTH) 81 mg EC tablet, Take 1 tablet (81 mg total) by mouth daily, Disp: 90 tablet, Rfl: 3  •  atorvastatin (LIPITOR) 40 mg tablet, Take 1 tablet (40 mg total) by mouth daily, Disp: 90 tablet, Rfl: 1  •  clobetasol (TEMOVATE) 0 05 % ointment, Apply topically 2 (two) times a week, Disp: 30 g, Rfl: 0  •  estradiol (ESTRACE VAGINAL) 0 1 mg/g vaginal cream, Apply a pea sized amount to vulva twice weekly, Disp: 42 5 g, Rfl: 0  •  ezetimibe (ZETIA) 10 mg tablet, Take 1 tablet (10 mg total) by mouth daily, Disp: 90 tablet, Rfl: 1  •  losartan (COZAAR) 50 mg tablet, Take 1 tablet (50 mg total) by mouth daily, Disp: 90 tablet, Rfl: 1  •  mirtazapine (REMERON) 15 mg tablet, Take 1 tablet (15 mg total) by mouth daily at bedtime, Disp: 90 tablet, Rfl: 1  •  nitroglycerin (NITROSTAT) 0 4 mg SL tablet, Place 1 tablet (0 4 mg total) under the tongue every 5 (five) minutes as needed for chest pain, Disp: 100 tablet, Rfl: 1  No Known Allergies    Imaging: No results found  Review of Systems:  Review of Systems   Constitutional: Negative  HENT: Negative  Eyes: Negative  Respiratory: Negative  Cardiovascular: Negative  Gastrointestinal: Negative  Endocrine: Negative  Musculoskeletal: Negative          Physical Exam:  /60 (BP Location: Left arm, Patient Position: Sitting, Cuff Size: Adult)   Pulse 84   Ht 5' (1 524 "m)   Wt 46 7 kg (103 lb)   SpO2 95%   BMI 20 12 kg/m²   Physical Exam  Constitutional:       General: She is not in acute distress  Appearance: Normal appearance  She is not ill-appearing  HENT:      Nose: Nose normal  No congestion or rhinorrhea  Mouth/Throat:      Mouth: Mucous membranes are moist       Pharynx: Oropharynx is clear  No oropharyngeal exudate or posterior oropharyngeal erythema  Neck:      Comments: Right 1 plus, left 2 plus carotid impulse  Cardiovascular:      Rate and Rhythm: Normal rate and regular rhythm  Pulses: Normal pulses  Heart sounds: No murmur heard  No friction rub  Pulmonary:      Effort: Pulmonary effort is normal  No respiratory distress  Breath sounds: No stridor  No wheezing or rhonchi  Musculoskeletal:         General: No swelling, deformity or signs of injury  Normal range of motion  Cervical back: Normal range of motion  No rigidity or tenderness  Neurological:      Mental Status: She is alert  This note was completed in part utilizing [employer] direct voice recognition software  Grammatical errors, random word insertion, spelling mistakes, occasional wrong word or \"sound-alike\" substitutions and incomplete sentences may be an occasional consequence of the system secondary to software limitations, ambient noise and hardware issues  At the time of dictation, efforts were made to edit, clarify and /or correct errors  Please read the chart carefully and recognize, using context, where substitutions have occurred  If you have any questions or concerns about the context, text or information contained within the body of this dictation, please contact myself, the provider, for further clarification    "

## 2023-06-14 ENCOUNTER — HOSPITAL ENCOUNTER (OUTPATIENT)
Dept: NON INVASIVE DIAGNOSTICS | Facility: CLINIC | Age: 83
Discharge: HOME | End: 2023-06-14
Payer: COMMERCIAL

## 2023-06-14 DIAGNOSIS — I65.23 BILATERAL CAROTID ARTERY STENOSIS: ICD-10-CM

## 2023-06-14 PROCEDURE — 93880 EXTRACRANIAL BILAT STUDY: CPT

## 2023-06-14 PROCEDURE — 93880 EXTRACRANIAL BILAT STUDY: CPT | Performed by: SURGERY

## 2023-07-26 DIAGNOSIS — I10 ESSENTIAL HYPERTENSION: ICD-10-CM

## 2023-07-26 RX ORDER — EZETIMIBE 10 MG/1
10 TABLET ORAL DAILY
Qty: 90 TABLET | Refills: 1 | Status: SHIPPED | OUTPATIENT
Start: 2023-07-26

## 2023-10-03 ENCOUNTER — APPOINTMENT (OUTPATIENT)
Dept: LAB | Facility: MEDICAL CENTER | Age: 83
End: 2023-10-03
Payer: COMMERCIAL

## 2023-10-10 DIAGNOSIS — E78.5 HYPERLIPIDEMIA, UNSPECIFIED HYPERLIPIDEMIA TYPE: ICD-10-CM

## 2023-10-10 RX ORDER — ATORVASTATIN CALCIUM 40 MG/1
40 TABLET, FILM COATED ORAL DAILY
Qty: 90 TABLET | Refills: 1 | Status: SHIPPED | OUTPATIENT
Start: 2023-10-10

## 2023-10-12 ENCOUNTER — RA CDI HCC (OUTPATIENT)
Dept: OTHER | Facility: HOSPITAL | Age: 83
End: 2023-10-12

## 2023-10-12 ENCOUNTER — TELEPHONE (OUTPATIENT)
Dept: VASCULAR SURGERY | Facility: CLINIC | Age: 83
End: 2023-10-12

## 2023-10-12 NOTE — TELEPHONE ENCOUNTER
Attempted to contact patient to schedule appointment(s) listed below. Requested patient call [phone] option 3 to schedule appointment(s). Patient's appointment(s) are due on or after 12/7/2023 .     Dopplers  [] Abdominal Aorta Iliac (AOIL)  [] Carotid (CV)   [] Celiac and/or Mesenteric  [] Endovascular Aortic Repair (EVAR)   [] Hemodialysis Access (HD)   [] Lower Limb Arterial (TRUDY)  [] Lower Limb Venous (LEV)  [] Lower Limb Venous Duplex with Reflux (LEVDR)  [] Renal Artery  [] Upper Limb Arterial (UEA)    [] Upper Limb Venous (UEV)              [] KAMRON and Waveform analysis     Advanced Imaging   [] CTA head/neck    [] CTA abdomen    [] CTA abdomen & pelvis    [] CT abdomen with/ without contrast  [] CT abdomen with contrast  [] CT abdomen without contrast    [] CT abdomen & pelvis with/ without contrast  [] CT abdomen & pelvis with contrast  [] CT abdomen & pelvis without contrast    Office Visit   [] New patient, patient last seen over 3 years ago  [x] Risk factor modification (RFM)   [] Follow up   [] Lost to follow up ([facility])

## 2023-10-12 NOTE — TELEPHONE ENCOUNTER
Per Imaging note, patient due for a repeat carotid after June 17, 2024 and follow up ov then        Will postpone the note until then

## 2023-10-12 NOTE — PROGRESS NOTES
[address] [facility] coding opportunities       Chart reviewed, no opportunity found: CHART REVIEWED, NO OPPORTUNITY FOUND        Patients Insurance        Commercial Insurance: [employer]

## 2023-10-27 ENCOUNTER — OFFICE VISIT (OUTPATIENT)
Dept: FAMILY MEDICINE CLINIC | Facility: CLINIC | Age: 83
End: 2023-10-27
Payer: COMMERCIAL

## 2023-10-27 VITALS
HEIGHT: 61 IN | BODY MASS INDEX: 19.3 KG/M2 | OXYGEN SATURATION: 96 % | SYSTOLIC BLOOD PRESSURE: 118 MMHG | TEMPERATURE: 98 F | DIASTOLIC BLOOD PRESSURE: 56 MMHG | WEIGHT: 102.2 LBS | HEART RATE: 70 BPM

## 2023-10-27 DIAGNOSIS — I12.9 BENIGN HYPERTENSION WITH CKD (CHRONIC KIDNEY DISEASE) STAGE III (HCC): ICD-10-CM

## 2023-10-27 DIAGNOSIS — G25.0 TREMOR, ESSENTIAL: ICD-10-CM

## 2023-10-27 DIAGNOSIS — N18.30 STAGE 3 CHRONIC KIDNEY DISEASE, UNSPECIFIED WHETHER STAGE 3A OR 3B CKD (HCC): ICD-10-CM

## 2023-10-27 DIAGNOSIS — N18.4 CHRONIC RENAL DISEASE, STAGE IV (HCC): ICD-10-CM

## 2023-10-27 DIAGNOSIS — E46 PROTEIN-CALORIE MALNUTRITION, UNSPECIFIED SEVERITY (HCC): ICD-10-CM

## 2023-10-27 DIAGNOSIS — E78.5 HYPERLIPIDEMIA, UNSPECIFIED HYPERLIPIDEMIA TYPE: ICD-10-CM

## 2023-10-27 DIAGNOSIS — I10 ESSENTIAL HYPERTENSION: ICD-10-CM

## 2023-10-27 DIAGNOSIS — E78.5 DYSLIPIDEMIA: ICD-10-CM

## 2023-10-27 DIAGNOSIS — I77.9 BILATERAL CAROTID ARTERY DISEASE, UNSPECIFIED TYPE (HCC): ICD-10-CM

## 2023-10-27 DIAGNOSIS — I25.119 CORONARY ARTERY DISEASE INVOLVING NATIVE CORONARY ARTERY OF NATIVE HEART WITH ANGINA PECTORIS (HCC): Primary | ICD-10-CM

## 2023-10-27 DIAGNOSIS — N18.30 BENIGN HYPERTENSION WITH CKD (CHRONIC KIDNEY DISEASE) STAGE III (HCC): ICD-10-CM

## 2023-10-27 PROCEDURE — 99214 OFFICE O/P EST MOD 30 MIN: CPT | Performed by: PHYSICIAN ASSISTANT

## 2023-10-27 RX ORDER — ATORVASTATIN CALCIUM 40 MG/1
40 TABLET, FILM COATED ORAL DAILY
Qty: 90 TABLET | Refills: 1 | Status: SHIPPED | OUTPATIENT
Start: 2023-10-27

## 2023-10-27 RX ORDER — MIRTAZAPINE 15 MG/1
15 TABLET, FILM COATED ORAL
Qty: 90 TABLET | Refills: 1 | Status: SHIPPED | OUTPATIENT
Start: 2023-10-27

## 2023-10-27 RX ORDER — EZETIMIBE 10 MG/1
10 TABLET ORAL DAILY
Qty: 90 TABLET | Refills: 1 | Status: SHIPPED | OUTPATIENT
Start: 2023-10-27

## 2023-10-27 RX ORDER — AMLODIPINE BESYLATE 5 MG/1
5 TABLET ORAL 2 TIMES DAILY
Qty: 180 TABLET | Refills: 1 | Status: SHIPPED | OUTPATIENT
Start: 2023-10-27

## 2023-10-27 RX ORDER — LOSARTAN POTASSIUM 50 MG/1
50 TABLET ORAL DAILY
Qty: 90 TABLET | Refills: 1 | Status: SHIPPED | OUTPATIENT
Start: 2023-10-27

## 2023-10-27 NOTE — PROGRESS NOTES
Assessment/Plan:     Diagnoses and all orders for this visit:    Coronary artery disease involving native coronary artery of native heart with angina pectoris ([address])  Comments:  Angina. No signs of congestive heart failure. Orders:  -     Comprehensive metabolic panel; Future    Benign hypertension with CKD (chronic kidney disease) stage III (HCC)  Comments:  Sugar is at goal continue current regimen  Orders:  -     CBC and differential; Future  -     Comprehensive metabolic panel; Future    Dyslipidemia  Comments:  's are at goal continue Zetia and statin therapy  Orders:  -     Lipid panel; Future    Protein-calorie malnutrition, unspecified severity ([address])  Comments: It is currently stable no change since last visit    Tremor, essential  Comments:  Chronic. Orders:  -     mirtazapine (REMERON) 15 mg tablet; Take 1 tablet (15 mg total) by mouth daily at bedtime    Stage 3 chronic kidney disease, unspecified whether stage 3a or 3b CKD ([address])  Comments: We will continue to monitor avoid over-the-counter NSAIDs  Orders:  -     CBC and differential; Future    Tremor, essential  Comments:  on remeron  Orders:  -     mirtazapine (REMERON) 15 mg tablet; Take 1 tablet (15 mg total) by mouth daily at bedtime    Essential hypertension  Comments:  rto 6 month recheck  Orders:  -     ezetimibe (ZETIA) 10 mg tablet; Take 1 tablet (10 mg total) by mouth daily  -     losartan (COZAAR) 50 mg tablet; Take 1 tablet (50 mg total) by mouth daily  -     amLODIPine (NORVASC) 5 mg tablet; Take 1 tablet (5 mg total) by mouth 2 (two) times a day    Hyperlipidemia, unspecified hyperlipidemia type  Comments:  on statin due for lab  Orders:  -     atorvastatin (LIPITOR) 40 mg tablet; Take 1 tablet (40 mg total) by mouth daily    Chronic renal disease, stage IV (HCC)    Bilateral carotid artery disease, unspecified type ([address])  Comments:  doppler  up  to  date.   no  progression  of  disease          Subjective:      Patient ID: Gopi Malik is a 80 y.o. female. 30-year-old young female presents in the office with daughter for follow-up chronic conditions. Patient has history of CAD and hypertension with chronic kidney disease stage III she is currently on a baby aspirin, losartan 50 mg and amlodipine 5 mg. Her blood pressure is well controlled. No episodes of chest pain. There is no evidence of congestive heart failure. And is elevated creatinine is elevated and her GFR is down to 28. No over-the-counter NSAIDs. We will continue to monitor. Hyperlipidemia she is on Zetia 10 mg and atorvastatin 40 mg. Panel is normal.  Does have essential tremor mostly of the right hand. She does take Remeron 15 mg at night. It is pretty well of bowels and urine. She does have some urgency urination at times.         The following portions of the patient's history were reviewed and updated as appropriate: She   Patient Active Problem List    Diagnosis Date Noted    Chronic renal disease, stage IV ([address]) 10/27/2023    Stage 3 chronic kidney disease, unspecified whether stage 3a or 3b CKD ([address]) 02/08/2022    Protein-calorie malnutrition, unspecified severity ([address]) 11/18/2021    Tremor, essential 04/01/2021    Bilateral carotid artery disease ([address]) 01/25/2021    Hypertension     Dyslipidemia     Coronary artery disease involving native coronary artery of native heart with angina pectoris ([address]) 11/24/2020     Current Outpatient Medications   Medication Sig Dispense Refill    amLODIPine (NORVASC) 5 mg tablet Take 1 tablet (5 mg total) by mouth 2 (two) times a day 180 tablet 1    aspirin (ECOTRIN LOW STRENGTH) 81 mg EC tablet Take 1 tablet (81 mg total) by mouth daily 90 tablet 3    atorvastatin (LIPITOR) 40 mg tablet Take 1 tablet (40 mg total) by mouth daily 90 tablet 1    estradiol (ESTRACE VAGINAL) 0.1 mg/g vaginal cream Apply a pea sized amount to vulva twice weekly 42.5 g 0    ezetimibe (ZETIA) 10 mg tablet Take 1 tablet (10 mg total) by mouth daily 90 tablet 1    losartan (COZAAR) 50 mg tablet Take 1 tablet (50 mg total) by mouth daily 90 tablet 1    mirtazapine (REMERON) 15 mg tablet Take 1 tablet (15 mg total) by mouth daily at bedtime 90 tablet 1    nitroglycerin (NITROSTAT) 0.4 mg SL tablet Place 1 tablet (0.4 mg total) under the tongue every 5 (five) minutes as needed for chest pain 100 tablet 1    clobetasol (TEMOVATE) 0.05 % ointment Apply topically 2 (two) times a week (Patient not taking: Reported on 10/27/2023) 30 g 0     No current facility-administered medications for this visit. She has No Known Allergies. .    Review of Systems   Constitutional:  Negative for activity change, appetite change and unexpected weight change. HENT:  Negative for ear pain and sore throat. Eyes:  Negative for visual disturbance. Respiratory:  Negative for cough, shortness of breath and wheezing. Cardiovascular:  Negative for chest pain and leg swelling. Gastrointestinal:  Negative for abdominal pain, blood in stool, constipation, diarrhea, nausea and vomiting. Genitourinary:  Negative for difficulty urinating. Musculoskeletal:  Negative for arthralgias and myalgias. Skin:  Negative for rash. Neurological:  Positive for tremors. Negative for dizziness, syncope, light-headedness and headaches. Psychiatric/Behavioral:  Negative for self-injury, sleep disturbance and suicidal ideas. The patient is not nervous/anxious. Objective:        Physical Exam  Vitals and nursing note reviewed. Constitutional:       General: She is not in acute distress. Appearance: She is well-developed. She is not diaphoretic. Comments: Thin  white  female. Ambulates  with  cane. HENT:      Head: Normocephalic and atraumatic. Right Ear: Tympanic membrane, ear canal and external ear normal.      Left Ear: Tympanic membrane, ear canal and external ear normal.      Mouth/Throat:      Pharynx: No posterior oropharyngeal erythema.    Eyes: Conjunctiva/sclera: Conjunctivae normal.      Pupils: Pupils are equal, round, and reactive to light. Neck:      Thyroid: No thyromegaly. Vascular: No carotid bruit. Cardiovascular:      Rate and Rhythm: Normal rate and regular rhythm. Heart sounds: Murmur heard. Systolic murmur is present with a grade of 1/6. No friction rub. No gallop. Pulmonary:      Effort: Pulmonary effort is normal. No respiratory distress. Breath sounds: Normal breath sounds. No wheezing. Abdominal:      General: Bowel sounds are normal. There is no distension. Palpations: Abdomen is soft. There is no mass. Tenderness: There is no abdominal tenderness. Lymphadenopathy:      Cervical: No cervical adenopathy. Skin:     General: Skin is warm and dry. Findings: No erythema or rash. Neurological:      Mental Status: She is alert and oriented to person, place, and time. Psychiatric:         Behavior: Behavior normal.         Thought Content: Thought content normal.         Cognition and Memory: Cognition is impaired.          Judgment: Judgment normal.

## 2023-12-12 ENCOUNTER — OFFICE VISIT (OUTPATIENT)
Dept: CARDIOLOGY CLINIC | Facility: MEDICAL CENTER | Age: 83
End: 2023-12-12
Payer: COMMERCIAL

## 2023-12-12 VITALS
HEIGHT: 61 IN | HEART RATE: 75 BPM | SYSTOLIC BLOOD PRESSURE: 126 MMHG | BODY MASS INDEX: 19.26 KG/M2 | DIASTOLIC BLOOD PRESSURE: 60 MMHG | WEIGHT: 102 LBS | OXYGEN SATURATION: 92 %

## 2023-12-12 DIAGNOSIS — I10 HYPERTENSION, UNSPECIFIED TYPE: ICD-10-CM

## 2023-12-12 DIAGNOSIS — N18.4 CHRONIC RENAL DISEASE, STAGE IV (HCC): ICD-10-CM

## 2023-12-12 DIAGNOSIS — I77.9 BILATERAL CAROTID ARTERY DISEASE, UNSPECIFIED TYPE (HCC): ICD-10-CM

## 2023-12-12 DIAGNOSIS — I25.119 CORONARY ARTERY DISEASE INVOLVING NATIVE CORONARY ARTERY OF NATIVE HEART WITH ANGINA PECTORIS (HCC): ICD-10-CM

## 2023-12-12 DIAGNOSIS — E78.5 DYSLIPIDEMIA: Primary | ICD-10-CM

## 2023-12-12 PROCEDURE — 99214 OFFICE O/P EST MOD 30 MIN: CPT | Performed by: INTERNAL MEDICINE

## 2023-12-12 NOTE — LETTER
December 12, 2023     Mariza South PA-C  [address]  [address] [facility]    Patient: Johnna Irby   YOB: 1940   Date of Visit: 12/12/2023       Dear Dr. Heraclio Moreno:    Thank you for referring Rios Do to me for evaluation. Below are my notes for this consultation. If you have questions, please do not hesitate to call me. I look forward to following your patient along with you. Sincerely,        Napoleon Martin MD        CC: No Recipients    Napoleon Martin MD  12/12/2023 11:56 AM  Incomplete                                             Cardiology Follow Up    Johnna Irby  1940  [id number]  [facility] [place] [address] [place] [address] [phone]    No diagnosis found. There are no diagnoses linked to this encounter. I had the pleasure of seeing Johnna Irby for a follow up visit. INTERVAL HISTORY: none    History of the presenting illness, Discussion/Summary and My Plan are as follows:::    Sesar Madera is a pleasant 26-year-old with hypertension, dyslipidemia, coronary artery disease and coronary artery bypass grafting in 2008, bilateral (Left at the time  of her coronary bypass and subsequently right in [place]) carotid endarterectomy, CKD with baseline creatinine around 1.2-1.3, who now follows at the [place] office since she lives close to here. .    She does not have any symptoms. She walks for about 15 minutes in her basement daily. She uses a cane when she walks outside. No chest pains or shortness of breath or change in physical capacity. Weight has also been stable. Cardiac exam is unremarkable.   ECG showed normal sinus rhythm      Plan:    Hypertension:  Controlled   she has CKD with baseline creatinine around 1.2-1.3, was stable but most recently had increased to 1.66 in October 2023, needs to be rechecked    Dyslipidemia:  Controlled, on a statin and Zetia with good control, no changes at this time    Coronary artery disease and prior CABG x 4-2008--Hallman-lad, SVG-diagonal 1, SVG-OM1, SVG-distal right coronary artery  , clinically stable, continue aspirin 81 mg, ARB, statin    History of bilateral carotid endarterectomy:  Not sure of this shows up on her history but she had carotid endarterectomy bilaterally, 1 at [facility], 1 in [place],   [address]  in June 2023 showed patent left and less than 50% right disease, seeing vascular as well.     Follow-up in 6 months     Latest Reference Range & Units 03/23/22 09:30 04/04/23 08:59 10/03/23 09:06   BUN 5 - 25 mg/dL 20 24 32 (H)   Creatinine 0.60 - 1.30 mg/dL 1.25 1.31 (H) 1.66 (H)   (H): Data is abnormally high     Latest Reference Range & Units 04/04/23 08:59 10/03/23 09:06   Cholesterol See Comment mg/dL 137 129   Triglycerides See Comment mg/dL 51 67   HDL >=50 mg/dL 65 54   Non-HDL Cholesterol mg/dl 72 75   LDL Calculated 0 - 100 mg/dL 62 62       Patient Active Problem List   Diagnosis   • Coronary artery disease involving native coronary artery of native heart with angina pectoris (HCC)   • Hypertension   • Dyslipidemia   • Bilateral carotid artery disease (HCC)   • Tremor, essential   • Protein-calorie malnutrition, unspecified severity ([facility])   • Stage 3 chronic kidney disease, unspecified whether stage 3a or 3b CKD ([facility])   • Chronic renal disease, stage IV ([facility])     Past Medical History:   Diagnosis Date   • Dyslipidemia    • Hypertension    • Tremor      Social History     Socioeconomic History   • Marital status: Single     Spouse name: Not on file   • Number of children: Not on file   • Years of education: Not on file   • Highest education level: Not on file   Occupational History   • Not on file   Tobacco Use   • Smoking status: Never   • Smokeless tobacco: Never   Vaping Use   • Vaping Use: Never used   Substance and Sexual Activity   • Alcohol use: Never   • Drug use: Never   • Sexual activity: Not on file   Other Topics Concern   • Not on file   Social History Narrative   • Not on file     Social Determinants of Health     Financial Resource Strain: Low Risk  (4/17/2023)    Overall Financial Resource Strain (CARDIA)    • Difficulty of Paying Living Expenses: Not hard at all   Food Insecurity: Not on file   Transportation Needs: No Transportation Needs (4/17/2023)    PRAPARE - Transportation    • Lack of Transportation (Medical): No    • Lack of Transportation (Non-Medical):  No   Physical Activity: Not on file   Stress: Not on file   Social Connections: Not on file   Intimate Partner Violence: Not on file   Housing Stability: Not on file      Family History   Problem Relation Age of Onset   • Coronary artery disease Mother      Past Surgical History:   Procedure Laterality Date   • CARDIAC SURGERY     • CAROTID ARTERY ANGIOPLASTY     • CLOSED MANIPULATION SHOULDER         Current Outpatient Medications:   •  amLODIPine (NORVASC) 5 mg tablet, Take 1 tablet (5 mg total) by mouth 2 (two) times a day, Disp: 180 tablet, Rfl: 1  •  aspirin (ECOTRIN LOW STRENGTH) 81 mg EC tablet, Take 1 tablet (81 mg total) by mouth daily, Disp: 90 tablet, Rfl: 3  •  atorvastatin (LIPITOR) 40 mg tablet, Take 1 tablet (40 mg total) by mouth daily, Disp: 90 tablet, Rfl: 1  •  estradiol (ESTRACE VAGINAL) 0.1 mg/g vaginal cream, Apply a pea sized amount to vulva twice weekly, Disp: 42.5 g, Rfl: 0  •  ezetimibe (ZETIA) 10 mg tablet, Take 1 tablet (10 mg total) by mouth daily, Disp: 90 tablet, Rfl: 1  •  losartan (COZAAR) 50 mg tablet, Take 1 tablet (50 mg total) by mouth daily, Disp: 90 tablet, Rfl: 1  •  mirtazapine (REMERON) 15 mg tablet, Take 1 tablet (15 mg total) by mouth daily at bedtime, Disp: 90 tablet, Rfl: 1  •  nitroglycerin (NITROSTAT) 0.4 mg SL tablet, Place 1 tablet (0.4 mg total) under the tongue every 5 (five) minutes as needed for chest pain, Disp: 100 tablet, Rfl: 1  •  clobetasol (TEMOVATE) 0.05 % ointment, Apply topically 2 (two) times a week (Patient not taking: Reported on 10/27/2023), Disp: 30 g, Rfl: 0  No Known Allergies    Imaging: No results found. Review of Systems:  Review of Systems   Constitutional: Negative. HENT: Negative. Eyes: Negative. Respiratory: Negative. Cardiovascular: Negative. Gastrointestinal: Negative. Endocrine: Negative. Musculoskeletal: Negative. Physical Exam:  /60 (BP Location: Left arm, Patient Position: Sitting, Cuff Size: Adult)   Pulse 75   Ht 5' 1" (1.549 m)   Wt 46.3 kg (102 lb)   SpO2 92%   BMI 19.27 kg/m²   Physical Exam  Constitutional:       General: She is not in acute distress. Appearance: Normal appearance. She is not ill-appearing. HENT:      Nose: Nose normal. No congestion or rhinorrhea. Mouth/Throat:      Mouth: Mucous membranes are moist.      Pharynx: Oropharynx is clear. No oropharyngeal exudate or posterior oropharyngeal erythema. Neck:      Comments: Right 1 plus, left 2 plus carotid impulse  Cardiovascular:      Rate and Rhythm: Normal rate and regular rhythm. Pulses: Normal pulses. Heart sounds: No murmur heard. No friction rub. Pulmonary:      Effort: Pulmonary effort is normal. No respiratory distress. Breath sounds: No stridor. No wheezing or rhonchi. Musculoskeletal:         General: No swelling, deformity or signs of injury. Normal range of motion. Cervical back: Normal range of motion. No rigidity or tenderness. Neurological:      Mental Status: She is alert. This note was completed in part utilizing [employer] direct voice recognition software. Grammatical errors, random word insertion, spelling mistakes, occasional wrong word or "sound-alike" substitutions and incomplete sentences may be an occasional consequence of the system secondary to software limitations, ambient noise and hardware issues. At the time of dictation, efforts were made to edit, clarify and /or correct errors.   Please read the chart carefully and recognize, using context, where substitutions have occurred. If you have any questions or concerns about the context, text or information contained within the body of this dictation, please contact myself, the provider, for further clarification.

## 2023-12-12 NOTE — PROGRESS NOTES
Cardiology Follow Up    Tatianna Saravia  1940  [id number]  [facility] [place]  [address] [address] [phone]    No diagnosis found. There are no diagnoses linked to this encounter. I had the pleasure of seeing Tatianna Saravia for a follow up visit. INTERVAL HISTORY: none    History of the presenting illness, Discussion/Summary and My Plan are as follows:::    Felicia Rodriguez is a pleasant 80-year-old with hypertension, dyslipidemia, coronary artery disease and coronary artery bypass grafting in 2008, bilateral (Left at the time  of her coronary bypass and subsequently right in [place]) carotid endarterectomy, CKD with baseline creatinine around 1.2-1.3, who now follows at the [place] office since she lives close to here. .    She does not have any symptoms. She walks for about 10-15 minutes in her basement daily. She uses a cane when she walks outside. No chest pains or shortness of breath or change in physical capacity. Weight has also been stable. Cardiac exam is unremarkable. Plan:    Hypertension:  Controlled   she has CKD with baseline creatinine around 1.2-1.3, was stable but most recently had increased to 1.66 in October 2023, needs to be rechecked-she would like to have it done through her primary care physician    Dyslipidemia:  Controlled, on a statin and Zetia with good control, no changes at this time    Coronary artery disease and prior CABG x 4-2008--Hallman-lad, SVG-diagonal 1, SVG-OM1, SVG-distal right coronary artery  , clinically stable, continue aspirin 81 mg, ARB, statin    History of bilateral carotid endarterectomy:  Not sure of this shows up on her history but she had carotid endarterectomy bilaterally, 1 at [facility], 1 in [place],   [address]  in June 2023 showed patent left and less than 50% right disease, seeing vascular as well.     Follow-up in 6 months     Latest Reference Range & Units 03/23/22 09:30 04/04/23 08:59 10/03/23 09:06   BUN 5 - 25 mg/dL 20 24 32 (H)   Creatinine 0.60 - 1.30 mg/dL 1.25 1.31 (H) 1.66 (H)   (H): Data is abnormally high     Latest Reference Range & Units 04/04/23 08:59 10/03/23 09:06   Cholesterol See Comment mg/dL 137 129   Triglycerides See Comment mg/dL 51 67   HDL >=50 mg/dL 65 54   Non-HDL Cholesterol mg/dl 72 75   LDL Calculated 0 - 100 mg/dL 62 62       Patient Active Problem List   Diagnosis    Coronary artery disease involving native coronary artery of native heart with angina pectoris (HCC)    Hypertension    Dyslipidemia    Bilateral carotid artery disease (HCC)    Tremor, essential    Protein-calorie malnutrition, unspecified severity (HCC)    Stage 3 chronic kidney disease, unspecified whether stage 3a or 3b CKD (HCC)    Chronic renal disease, stage IV (HCC)     Past Medical History:   Diagnosis Date    Dyslipidemia     Hypertension     Tremor      Social History     Socioeconomic History    Marital status: Single     Spouse name: Not on file    Number of children: Not on file    Years of education: Not on file    Highest education level: Not on file   Occupational History    Not on file   Tobacco Use    Smoking status: Never    Smokeless tobacco: Never   Vaping Use    Vaping Use: Never used   Substance and Sexual Activity    Alcohol use: Never    Drug use: Never    Sexual activity: Not on file   Other Topics Concern    Not on file   Social History Narrative    Not on file     Social Determinants of Health     Financial Resource Strain: Low Risk  (4/17/2023)    Overall Financial Resource Strain (CARDIA)     Difficulty of Paying Living Expenses: Not hard at all   Food Insecurity: Not on file   Transportation Needs: No Transportation Needs (4/17/2023)    PRAPARE - Transportation     Lack of Transportation (Medical): No     Lack of Transportation (Non-Medical):  No   Physical Activity: Not on file   Stress: Not on file   Social Connections: Not on file   Intimate Partner Violence: Not on file   Housing Stability: Not on file      Family History   Problem Relation Age of Onset    Coronary artery disease Mother      Past Surgical History:   Procedure Laterality Date    CARDIAC SURGERY      CAROTID ARTERY ANGIOPLASTY      CLOSED MANIPULATION SHOULDER         Current Outpatient Medications:     amLODIPine (NORVASC) 5 mg tablet, Take 1 tablet (5 mg total) by mouth 2 (two) times a day, Disp: 180 tablet, Rfl: 1    aspirin (ECOTRIN LOW STRENGTH) 81 mg EC tablet, Take 1 tablet (81 mg total) by mouth daily, Disp: 90 tablet, Rfl: 3    atorvastatin (LIPITOR) 40 mg tablet, Take 1 tablet (40 mg total) by mouth daily, Disp: 90 tablet, Rfl: 1    estradiol (ESTRACE VAGINAL) 0.1 mg/g vaginal cream, Apply a pea sized amount to vulva twice weekly, Disp: 42.5 g, Rfl: 0    ezetimibe (ZETIA) 10 mg tablet, Take 1 tablet (10 mg total) by mouth daily, Disp: 90 tablet, Rfl: 1    losartan (COZAAR) 50 mg tablet, Take 1 tablet (50 mg total) by mouth daily, Disp: 90 tablet, Rfl: 1    mirtazapine (REMERON) 15 mg tablet, Take 1 tablet (15 mg total) by mouth daily at bedtime, Disp: 90 tablet, Rfl: 1    nitroglycerin (NITROSTAT) 0.4 mg SL tablet, Place 1 tablet (0.4 mg total) under the tongue every 5 (five) minutes as needed for chest pain, Disp: 100 tablet, Rfl: 1    clobetasol (TEMOVATE) 0.05 % ointment, Apply topically 2 (two) times a week (Patient not taking: Reported on 10/27/2023), Disp: 30 g, Rfl: 0  No Known Allergies    Imaging: No results found. Review of Systems:  Review of Systems   Constitutional: Negative. HENT: Negative. Eyes: Negative. Respiratory: Negative. Cardiovascular: Negative. Gastrointestinal: Negative. Endocrine: Negative. Musculoskeletal: Negative.         Physical Exam:  /60 (BP Location: Left arm, Patient Position: Sitting, Cuff Size: Adult)   Pulse 75   Ht 5' 1" (1.549 m)   Wt 46.3 kg (102 lb)   SpO2 92%   BMI 19.27 kg/m² Physical Exam  Constitutional:       General: She is not in acute distress. Appearance: Normal appearance. She is not ill-appearing. HENT:      Nose: Nose normal. No congestion or rhinorrhea. Mouth/Throat:      Mouth: Mucous membranes are moist.      Pharynx: Oropharynx is clear. No oropharyngeal exudate or posterior oropharyngeal erythema. Neck:      Comments: Right 1 plus, left 2 plus carotid impulse  Cardiovascular:      Rate and Rhythm: Normal rate and regular rhythm. Pulses: Normal pulses. Heart sounds: No murmur heard. No friction rub. Pulmonary:      Effort: Pulmonary effort is normal. No respiratory distress. Breath sounds: No stridor. No wheezing or rhonchi. Musculoskeletal:         General: No swelling, deformity or signs of injury. Normal range of motion. Cervical back: Normal range of motion. No rigidity or tenderness. Neurological:      Mental Status: She is alert. This note was completed in part utilizing [employer] direct voice recognition software. Grammatical errors, random word insertion, spelling mistakes, occasional wrong word or "sound-alike" substitutions and incomplete sentences may be an occasional consequence of the system secondary to software limitations, ambient noise and hardware issues. At the time of dictation, efforts were made to edit, clarify and /or correct errors. Please read the chart carefully and recognize, using context, where substitutions have occurred. If you have any questions or concerns about the context, text or information contained within the body of this dictation, please contact myself, the provider, for further clarification.

## 2024-03-19 DIAGNOSIS — I10 ESSENTIAL HYPERTENSION: ICD-10-CM

## 2024-03-19 RX ORDER — LOSARTAN POTASSIUM 50 MG/1
50 TABLET ORAL DAILY
Qty: 90 TABLET | Refills: 1 | Status: SHIPPED | OUTPATIENT
Start: 2024-03-19

## 2024-04-08 ENCOUNTER — APPOINTMENT (OUTPATIENT)
Dept: LAB | Facility: MEDICAL CENTER | Age: 84
End: 2024-04-08
Payer: COMMERCIAL

## 2024-04-08 DIAGNOSIS — I25.119 CORONARY ARTERY DISEASE INVOLVING NATIVE CORONARY ARTERY OF NATIVE HEART WITH ANGINA PECTORIS (HCC): ICD-10-CM

## 2024-04-08 DIAGNOSIS — N18.30 STAGE 3 CHRONIC KIDNEY DISEASE, UNSPECIFIED WHETHER STAGE 3A OR 3B CKD (HCC): ICD-10-CM

## 2024-04-08 DIAGNOSIS — N18.30 BENIGN HYPERTENSION WITH CKD (CHRONIC KIDNEY DISEASE) STAGE III (HCC): ICD-10-CM

## 2024-04-08 DIAGNOSIS — E78.5 DYSLIPIDEMIA: ICD-10-CM

## 2024-04-08 DIAGNOSIS — I12.9 BENIGN HYPERTENSION WITH CKD (CHRONIC KIDNEY DISEASE) STAGE III (HCC): ICD-10-CM

## 2024-04-08 LAB
ALBUMIN SERPL BCP-MCNC: 4.1 G/DL (ref 3.5–5)
ALP SERPL-CCNC: 71 U/L (ref 34–104)
ALT SERPL W P-5'-P-CCNC: 22 U/L (ref 7–52)
ANION GAP SERPL CALCULATED.3IONS-SCNC: 9 MMOL/L (ref 4–13)
AST SERPL W P-5'-P-CCNC: 30 U/L (ref 13–39)
BASOPHILS # BLD AUTO: 0.02 THOUSANDS/ÂΜL (ref 0–0.1)
BASOPHILS NFR BLD AUTO: 0 % (ref 0–1)
BILIRUB SERPL-MCNC: 0.53 MG/DL (ref 0.2–1)
BUN SERPL-MCNC: 33 MG/DL (ref 5–25)
CALCIUM SERPL-MCNC: 9.9 MG/DL (ref 8.4–10.2)
CHLORIDE SERPL-SCNC: 101 MMOL/L (ref 96–108)
CHOLEST SERPL-MCNC: 122 MG/DL
CO2 SERPL-SCNC: 28 MMOL/L (ref 21–32)
CREAT SERPL-MCNC: 1.89 MG/DL (ref 0.6–1.3)
EOSINOPHIL # BLD AUTO: 0.09 THOUSAND/ÂΜL (ref 0–0.61)
EOSINOPHIL NFR BLD AUTO: 2 % (ref 0–6)
ERYTHROCYTE [DISTWIDTH] IN BLOOD BY AUTOMATED COUNT: 12.8 % (ref 11.6–15.1)
GFR SERPL CREATININE-BSD FRML MDRD: 24 ML/MIN/1.73SQ M
GLUCOSE P FAST SERPL-MCNC: 91 MG/DL (ref 65–99)
HCT VFR BLD AUTO: 36.1 % (ref 34.8–46.1)
HDLC SERPL-MCNC: 52 MG/DL
HGB BLD-MCNC: 11.4 G/DL (ref 11.5–15.4)
IMM GRANULOCYTES # BLD AUTO: 0.02 THOUSAND/UL (ref 0–0.2)
IMM GRANULOCYTES NFR BLD AUTO: 0 % (ref 0–2)
LDLC SERPL CALC-MCNC: 56 MG/DL (ref 0–100)
LYMPHOCYTES # BLD AUTO: 0.42 THOUSANDS/ÂΜL (ref 0.6–4.47)
LYMPHOCYTES NFR BLD AUTO: 8 % (ref 14–44)
MCH RBC QN AUTO: 30.2 PG (ref 26.8–34.3)
MCHC RBC AUTO-ENTMCNC: 31.6 G/DL (ref 31.4–37.4)
MCV RBC AUTO: 96 FL (ref 82–98)
MONOCYTES # BLD AUTO: 0.54 THOUSAND/ÂΜL (ref 0.17–1.22)
MONOCYTES NFR BLD AUTO: 10 % (ref 4–12)
NEUTROPHILS # BLD AUTO: 4.35 THOUSANDS/ÂΜL (ref 1.85–7.62)
NEUTS SEG NFR BLD AUTO: 80 % (ref 43–75)
NONHDLC SERPL-MCNC: 70 MG/DL
NRBC BLD AUTO-RTO: 0 /100 WBCS
PLATELET # BLD AUTO: 180 THOUSANDS/UL (ref 149–390)
PMV BLD AUTO: 10.3 FL (ref 8.9–12.7)
POTASSIUM SERPL-SCNC: 4.7 MMOL/L (ref 3.5–5.3)
PROT SERPL-MCNC: 6.7 G/DL (ref 6.4–8.4)
RBC # BLD AUTO: 3.77 MILLION/UL (ref 3.81–5.12)
SODIUM SERPL-SCNC: 138 MMOL/L (ref 135–147)
TRIGL SERPL-MCNC: 69 MG/DL
WBC # BLD AUTO: 5.44 THOUSAND/UL (ref 4.31–10.16)

## 2024-04-08 PROCEDURE — 80053 COMPREHEN METABOLIC PANEL: CPT

## 2024-04-08 PROCEDURE — 80061 LIPID PANEL: CPT

## 2024-04-08 PROCEDURE — 85025 COMPLETE CBC W/AUTO DIFF WBC: CPT

## 2024-04-08 PROCEDURE — 36415 COLL VENOUS BLD VENIPUNCTURE: CPT

## 2024-04-26 PROBLEM — N18.4 CHRONIC RENAL DISEASE, STAGE IV (HCC): Status: ACTIVE | Noted: 2022-02-08

## 2024-04-26 PROBLEM — Z98.890 HISTORY OF BILATERAL CAROTID ENDARTERECTOMY: Status: ACTIVE | Noted: 2021-01-25

## 2024-04-29 ENCOUNTER — OFFICE VISIT (OUTPATIENT)
Dept: FAMILY MEDICINE CLINIC | Facility: CLINIC | Age: 84
End: 2024-04-29
Payer: COMMERCIAL

## 2024-04-29 VITALS
WEIGHT: 96 LBS | BODY MASS INDEX: 18.12 KG/M2 | HEART RATE: 66 BPM | OXYGEN SATURATION: 94 % | SYSTOLIC BLOOD PRESSURE: 110 MMHG | HEIGHT: 61 IN | TEMPERATURE: 98.4 F | DIASTOLIC BLOOD PRESSURE: 50 MMHG

## 2024-04-29 DIAGNOSIS — N18.4 CHRONIC RENAL DISEASE, STAGE IV (HCC): ICD-10-CM

## 2024-04-29 DIAGNOSIS — N18.4 BENIGN HYPERTENSION WITH CKD (CHRONIC KIDNEY DISEASE) STAGE IV (HCC): ICD-10-CM

## 2024-04-29 DIAGNOSIS — E78.5 HYPERLIPIDEMIA, UNSPECIFIED HYPERLIPIDEMIA TYPE: ICD-10-CM

## 2024-04-29 DIAGNOSIS — I77.9 BILATERAL CAROTID ARTERY DISEASE, UNSPECIFIED TYPE (HCC): ICD-10-CM

## 2024-04-29 DIAGNOSIS — Z98.890 HISTORY OF BILATERAL CAROTID ENDARTERECTOMY: ICD-10-CM

## 2024-04-29 DIAGNOSIS — G25.0 TREMOR, ESSENTIAL: ICD-10-CM

## 2024-04-29 DIAGNOSIS — I25.119 CORONARY ARTERY DISEASE INVOLVING NATIVE CORONARY ARTERY OF NATIVE HEART WITH ANGINA PECTORIS (HCC): ICD-10-CM

## 2024-04-29 DIAGNOSIS — Z00.00 MEDICARE ANNUAL WELLNESS VISIT, SUBSEQUENT: Primary | ICD-10-CM

## 2024-04-29 DIAGNOSIS — R41.3 MEMORY LOSS: ICD-10-CM

## 2024-04-29 DIAGNOSIS — E78.5 DYSLIPIDEMIA: ICD-10-CM

## 2024-04-29 DIAGNOSIS — E46 PROTEIN-CALORIE MALNUTRITION, UNSPECIFIED SEVERITY (HCC): ICD-10-CM

## 2024-04-29 DIAGNOSIS — D63.8 ANEMIA OF CHRONIC DISEASE: ICD-10-CM

## 2024-04-29 DIAGNOSIS — I10 ESSENTIAL HYPERTENSION: ICD-10-CM

## 2024-04-29 DIAGNOSIS — I12.9 BENIGN HYPERTENSION WITH CKD (CHRONIC KIDNEY DISEASE) STAGE IV (HCC): ICD-10-CM

## 2024-04-29 PROCEDURE — 3288F FALL RISK ASSESSMENT DOCD: CPT | Performed by: PHYSICIAN ASSISTANT

## 2024-04-29 PROCEDURE — 1159F MED LIST DOCD IN RCRD: CPT | Performed by: PHYSICIAN ASSISTANT

## 2024-04-29 PROCEDURE — 3725F SCREEN DEPRESSION PERFORMED: CPT | Performed by: PHYSICIAN ASSISTANT

## 2024-04-29 PROCEDURE — 1125F AMNT PAIN NOTED PAIN PRSNT: CPT | Performed by: PHYSICIAN ASSISTANT

## 2024-04-29 PROCEDURE — 3074F SYST BP LT 130 MM HG: CPT | Performed by: PHYSICIAN ASSISTANT

## 2024-04-29 PROCEDURE — 1160F RVW MEDS BY RX/DR IN RCRD: CPT | Performed by: PHYSICIAN ASSISTANT

## 2024-04-29 PROCEDURE — 99214 OFFICE O/P EST MOD 30 MIN: CPT | Performed by: PHYSICIAN ASSISTANT

## 2024-04-29 PROCEDURE — 3078F DIAST BP <80 MM HG: CPT | Performed by: PHYSICIAN ASSISTANT

## 2024-04-29 PROCEDURE — 1170F FXNL STATUS ASSESSED: CPT | Performed by: PHYSICIAN ASSISTANT

## 2024-04-29 PROCEDURE — G0439 PPPS, SUBSEQ VISIT: HCPCS | Performed by: PHYSICIAN ASSISTANT

## 2024-04-29 RX ORDER — MIRTAZAPINE 15 MG/1
15 TABLET, FILM COATED ORAL
Qty: 90 TABLET | Refills: 1 | Status: SHIPPED | OUTPATIENT
Start: 2024-04-29

## 2024-04-29 RX ORDER — ATORVASTATIN CALCIUM 40 MG/1
40 TABLET, FILM COATED ORAL DAILY
Qty: 90 TABLET | Refills: 1 | Status: SHIPPED | OUTPATIENT
Start: 2024-04-29

## 2024-04-29 RX ORDER — EZETIMIBE 10 MG/1
10 TABLET ORAL DAILY
Qty: 90 TABLET | Refills: 1 | Status: SHIPPED | OUTPATIENT
Start: 2024-04-29

## 2024-04-29 RX ORDER — LOSARTAN POTASSIUM 50 MG/1
50 TABLET ORAL DAILY
Qty: 90 TABLET | Refills: 1 | Status: SHIPPED | OUTPATIENT
Start: 2024-04-29

## 2024-04-29 RX ORDER — AMLODIPINE BESYLATE 5 MG/1
5 TABLET ORAL 2 TIMES DAILY
Qty: 180 TABLET | Refills: 1 | Status: SHIPPED | OUTPATIENT
Start: 2024-04-29

## 2024-04-29 NOTE — PATIENT INSTRUCTIONS
Medicare Preventive Visit Patient Instructions  Thank you for completing your Welcome to Medicare Visit or Medicare Annual Wellness Visit today. Your next wellness visit will be due in one year (4/30/2025).  The screening/preventive services that you may require over the next 5-10 years are detailed below. Some tests may not apply to you based off risk factors and/or age. Screening tests ordered at today's visit but not completed yet may show as past due. Also, please note that scanned in results may not display below.  Preventive Screenings:  Service Recommendations Previous Testing/Comments   Colorectal Cancer Screening  * Colonoscopy    * Fecal Occult Blood Test (FOBT)/Fecal Immunochemical Test (FIT)  * Fecal DNA/Cologuard Test  * Flexible Sigmoidoscopy Age: 45-75 years old   Colonoscopy: every 10 years (may be performed more frequently if at higher risk)  OR  FOBT/FIT: every 1 year  OR  Cologuard: every 3 years  OR  Sigmoidoscopy: every 5 years  Screening may be recommended earlier than age 45 if at higher risk for colorectal cancer. Also, an individualized decision between you and your healthcare provider will decide whether screening between the ages of 76-85 would be appropriate. Colonoscopy: Not on file  FOBT/FIT: Not on file  Cologuard: Not on file  Sigmoidoscopy: Not on file          Breast Cancer Screening Age: 40+ years old  Frequency: every 1-2 years  Not required if history of left and right mastectomy Mammogram: Not on file        Cervical Cancer Screening Between the ages of 21-29, pap smear recommended once every 3 years.   Between the ages of 30-65, can perform pap smear with HPV co-testing every 5 years.   Recommendations may differ for women with a history of total hysterectomy, cervical cancer, or abnormal pap smears in past. Pap Smear: Not on file    Screening Not Indicated   Hepatitis C Screening Once for adults born between 1945 and 1965  More frequently in patients at high risk for Hepatitis  C Hep C Antibody: Not on file        Diabetes Screening 1-2 times per year if you're at risk for diabetes or have pre-diabetes Fasting glucose: 91 mg/dL (4/8/2024)  A1C: 6.0 (1/19/2022)  Screening Current   Cholesterol Screening Once every 5 years if you don't have a lipid disorder. May order more often based on risk factors. Lipid panel: 04/08/2024    Screening Not Indicated  History Lipid Disorder     Other Preventive Screenings Covered by Medicare:  Abdominal Aortic Aneurysm (AAA) Screening: covered once if your at risk. You're considered to be at risk if you have a family history of AAA.  Lung Cancer Screening: covers low dose CT scan once per year if you meet all of the following conditions: (1) Age 55-77; (2) No signs or symptoms of lung cancer; (3) Current smoker or have quit smoking within the last 15 years; (4) You have a tobacco smoking history of at least 20 pack years (packs per day multiplied by number of years you smoked); (5) You get a written order from a healthcare provider.  Glaucoma Screening: covered annually if you're considered high risk: (1) You have diabetes OR (2) Family history of glaucoma OR (3)  aged 50 and older OR (4)  American aged 65 and older  Osteoporosis Screening: covered every 2 years if you meet one of the following conditions: (1) You're estrogen deficient and at risk for osteoporosis based off medical history and other findings; (2) Have a vertebral abnormality; (3) On glucocorticoid therapy for more than 3 months; (4) Have primary hyperparathyroidism; (5) On osteoporosis medications and need to assess response to drug therapy.   Last bone density test (DXA Scan): 12/21/2020.  HIV Screening: covered annually if you're between the age of 15-65. Also covered annually if you are younger than 15 and older than 65 with risk factors for HIV infection. For pregnant patients, it is covered up to 3 times per pregnancy.    Immunizations:  Immunization  Recommendations   Influenza Vaccine Annual influenza vaccination during flu season is recommended for all persons aged >= 6 months who do not have contraindications   Pneumococcal Vaccine   * Pneumococcal conjugate vaccine = PCV13 (Prevnar 13), PCV15 (Vaxneuvance), PCV20 (Prevnar 20)  * Pneumococcal polysaccharide vaccine = PPSV23 (Pneumovax) Adults 19-63 yo with certain risk factors or if 65+ yo  If never received any pneumonia vaccine: recommend Prevnar 20 (PCV20)  Give PCV20 if previously received 1 dose of PCV13 or PPSV23   Hepatitis B Vaccine 3 dose series if at intermediate or high risk (ex: diabetes, end stage renal disease, liver disease)   Respiratory syncytial virus (RSV) Vaccine - COVERED BY MEDICARE PART D  * RSVPreF3 (Arexvy) CDC recommends that adults 60 years of age and older may receive a single dose of RSV vaccine using shared clinical decision-making (SCDM)   Tetanus (Td) Vaccine - COST NOT COVERED BY MEDICARE PART B Following completion of primary series, a booster dose should be given every 10 years to maintain immunity against tetanus. Td may also be given as tetanus wound prophylaxis.   Tdap Vaccine - COST NOT COVERED BY MEDICARE PART B Recommended at least once for all adults. For pregnant patients, recommended with each pregnancy.   Shingles Vaccine (Shingrix) - COST NOT COVERED BY MEDICARE PART B  2 shot series recommended in those 19 years and older who have or will have weakened immune systems or those 50 years and older     Health Maintenance Due:  There are no preventive care reminders to display for this patient.  Immunizations Due:      Topic Date Due   • Pneumococcal Vaccine: 65+ Years (1 of 1 - PCV) Never done   • COVID-19 Vaccine (7 - 2023-24 season) 11/29/2023     Advance Directives   What are advance directives?  Advance directives are legal documents that state your wishes and plans for medical care. These plans are made ahead of time in case you lose your ability to make  decisions for yourself. Advance directives can apply to any medical decision, such as the treatments you want, and if you want to donate organs.   What are the types of advance directives?  There are many types of advance directives, and each state has rules about how to use them. You may choose a combination of any of the following:  Living will:  This is a written record of the treatment you want. You can also choose which treatments you do not want, which to limit, and which to stop at a certain time. This includes surgery, medicine, IV fluid, and tube feedings.   Durable power of  for healthcare (DPAHC):  This is a written record that states who you want to make healthcare choices for you when you are unable to make them for yourself. This person, called a proxy, is usually a family member or a friend. You may choose more than 1 proxy.  Do not resuscitate (DNR) order:  A DNR order is used in case your heart stops beating or you stop breathing. It is a request not to have certain forms of treatment, such as CPR. A DNR order may be included in other types of advance directives.  Medical directive:  This covers the care that you want if you are in a coma, near death, or unable to make decisions for yourself. You can list the treatments you want for each condition. Treatment may include pain medicine, surgery, blood transfusions, dialysis, IV or tube feedings, and a ventilator (breathing machine).  Values history:  This document has questions about your views, beliefs, and how you feel and think about life. This information can help others choose the care that you would choose.  Why are advance directives important?  An advance directive helps you control your care. Although spoken wishes may be used, it is better to have your wishes written down. Spoken wishes can be misunderstood, or not followed. Treatments may be given even if you do not want them. An advance directive may make it easier for your family  to make difficult choices about your care.   Urinary Incontinence   Urinary incontinence (UI)  is when you lose control of your bladder. UI develops because your bladder cannot store or empty urine properly. The 3 most common types of UI are stress incontinence, urge incontinence, or both.  Medicines:   May be given to help strengthen your bladder control. Report any side effects of medication to your healthcare provider.  Do pelvic muscle exercises often:  Your pelvic muscles help you stop urinating. Squeeze these muscles tight for 5 seconds, then relax for 5 seconds. Gradually work up to squeezing for 10 seconds. Do 3 sets of 15 repetitions a day, or as directed. This will help strengthen your pelvic muscles and improve bladder control.  Train your bladder:  Go to the bathroom at set times, such as every 2 hours, even if you do not feel the urge to go. You can also try to hold your urine when you feel the urge to go. For example, hold your urine for 5 minutes when you feel the urge to go. As that becomes easier, hold your urine for 10 minutes.   Self-care:   Keep a UI record.  Write down how often you leak urine and how much you leak. Make a note of what you were doing when you leaked urine.  Drink liquids as directed. You may need to limit the amount of liquid you drink to help control your urine leakage. Do not drink any liquid right before you go to bed. Limit or do not have drinks that contain caffeine or alcohol.   Prevent constipation.  Eat a variety of high-fiber foods. Good examples are high-fiber cereals, beans, vegetables, and whole-grain breads. Walking is the best way to trigger your intestines to have a bowel movement.  Exercise regularly and maintain a healthy weight.  Weight loss and exercise will decrease pressure on your bladder and help you control your leakage.   Use a catheter as directed  to help empty your bladder. A catheter is a tiny, plastic tube that is put into your bladder to drain your  urine.   Go to behavior therapy as directed.  Behavior therapy may be used to help you learn to control your urge to urinate.    Underweight  Underweight is defined as having a body mass index (BMI) of less than 18.5 kg/m2   Anorexia  is a loss of appetite, decreased food intake, or both. Your appetite naturally decreases as you get older. You also get full faster than you used to. This occurs because your body needs less energy. Other body changes can also lead to a decreased appetite. Even though some appetite loss is normal, you still need to get enough calories and nutrients to keep you healthy. You can start to lose too much weight if you do not eat as much food as your body needs. Unwanted weight loss can cause health problems, or worsen health problems you already have. You can also become dehydrated if you do not drink enough liquid.  How to eat healthy and get enough nutrients:   Choose healthy foods.  Eat a variety of fruits, vegetables, whole grains, low-fat dairy foods, lean meats, and other protein foods. Limit foods high in fat, sugar, and salt. Limit or avoid alcohol as directed. Work with a dietitian to help you plan your meals if you need to follow a special diet. A dietitian can also teach you how to modify foods if you have trouble chewing or swallowing.   Snack on healthy foods between meals  if you only eat a small amount during meals. Snacks provide extra healthy nutrients and calories between meals. Examples include fruit, cheese, and whole grain crackers.   Drink liquids as directed  to avoid dehydration. Drink liquids between meals if they cause you to get full too quickly during meals. Ask how much liquid to drink each day and which liquids are best for you.   Use herbs, spices, and flavor enhancers to add flavor to foods.  Avoid using herbs and spice blends that also contain sodium. Ask your healthcare provider or dietitian about flavor enhancers. Flavor enhancers with ham, natural edward,  and roast beef flavors can also be sprinkled on food to add flavor.   Share meals with others as often as you can.  Eating with others may help you to eat better during meal time. Ask family members, neighbors, or friends to join you for lunch. There are also senior centers where you can meet people, and share meals with them.   Ask family and friends for help  with shopping or preparing foods. Ask for a ride to the grocery store, if needed.       © Copyright [employer] 2018 Information is for End User's use only and may not be sold, redistributed or otherwise used for commercial purposes. All illustrations and images included in CareNotes® are the copyrighted property of [employer] or [email or web address]

## 2024-04-29 NOTE — PROGRESS NOTES
Assessment and Plan:     Problem List Items Addressed This Visit          Cardiovascular and Mediastinum    Coronary artery disease involving native coronary artery of native heart with angina pectoris (HCC)    Relevant Medications    amLODIPine (NORVASC) 5 mg tablet    Other Relevant Orders    CBC and differential    Benign hypertension with CKD (chronic kidney disease) stage IV (HCC)    Relevant Medications    ezetimibe (ZETIA) 10 mg tablet    losartan (COZAAR) 50 mg tablet    amLODIPine (NORVASC) 5 mg tablet    Other Relevant Orders    Basic metabolic panel    Bilateral carotid artery disease, unspecified type (HCC)       Nervous and Auditory    Tremor, essential    Relevant Medications    mirtazapine (REMERON) 15 mg tablet       Genitourinary    Chronic renal disease, stage IV (HCC)       Surgery/Wound/Pain    History of bilateral carotid endarterectomy       Other    Protein-calorie malnutrition, unspecified severity (HCC)    Relevant Orders    CBC and differential    Dyslipidemia    Relevant Orders    CBC and differential     Other Visit Diagnoses       Medicare annual wellness visit, subsequent    -  Primary    Anemia of chronic disease        Relevant Orders    Iron Panel (Includes Ferritin, Iron Sat%, Iron, and TIBC)    Basic metabolic panel    Essential hypertension        rto 6 month recheck    Relevant Medications    ezetimibe (ZETIA) 10 mg tablet    losartan (COZAAR) 50 mg tablet    amLODIPine (NORVASC) 5 mg tablet    Hyperlipidemia, unspecified hyperlipidemia type        on statin due for lab    Relevant Medications    ezetimibe (ZETIA) 10 mg tablet    atorvastatin (LIPITOR) 40 mg tablet    Memory loss                Depression Screening and Follow-up Plan: Patient was screened for depression during today's encounter. They screened negative with a PHQ-2 score of 2.    Urinary Incontinence Plan of Care: counseling topics discussed: use restroom every 2 hours. Urinary  incontience.       Preventive  health issues were discussed with patient, and age appropriate screening tests were ordered as noted in patient's After Visit Summary.  Personalized health advice and appropriate referrals for health education or preventive services given if needed, as noted in patient's After Visit Summary.     History of Present Illness:     Patient presents for a Medicare Wellness Visit    HPI   Patient Care Team:  Steffanie Bnenett PA-C as PCP - General (Family Medicine)     Review of Systems:     Review of Systems   Constitutional:  Negative for activity change, appetite change and unexpected weight change.   HENT:  Negative for ear pain and sore throat.    Eyes:  Negative for visual disturbance.   Respiratory:  Negative for cough, shortness of breath and wheezing.    Cardiovascular:  Negative for chest pain and leg swelling.   Gastrointestinal:  Negative for abdominal pain, blood in stool, constipation, diarrhea, nausea and vomiting.   Genitourinary:  Negative for difficulty urinating.   Musculoskeletal:  Positive for arthralgias and gait problem. Negative for myalgias.   Skin:  Negative for rash.   Neurological:  Negative for dizziness, syncope, light-headedness and headaches.   Psychiatric/Behavioral:  Negative for self-injury, sleep disturbance and suicidal ideas. The patient is not nervous/anxious.         Problem List:     Patient Active Problem List   Diagnosis    Coronary artery disease involving native coronary artery of native heart with angina pectoris (HCC)    Benign hypertension with CKD (chronic kidney disease) stage IV (HCC)    Dyslipidemia    History of bilateral carotid endarterectomy    Tremor, essential    Protein-calorie malnutrition, unspecified severity (HCC)    Chronic renal disease, stage IV (HCC)    Bilateral carotid artery disease, unspecified type (HCC)      Past Medical and Surgical History:     Past Medical History:   Diagnosis Date    Dyslipidemia     Hypertension     Tremor      Past Surgical  History:   Procedure Laterality Date    CARDIAC SURGERY      CAROTID ARTERY ANGIOPLASTY      CLOSED MANIPULATION SHOULDER        Family History:     Family History   Problem Relation Age of Onset    Coronary artery disease Mother       Social History:     Social History     Socioeconomic History    Marital status: Single     Spouse name: None    Number of children: None    Years of education: None    Highest education level: None   Occupational History    None   Tobacco Use    Smoking status: Never     Passive exposure: Past    Smokeless tobacco: Never   Vaping Use    Vaping status: Never Used   Substance and Sexual Activity    Alcohol use: Never    Drug use: Never    Sexual activity: None   Other Topics Concern    None   Social History Narrative    None     Social Determinants of Health     Financial Resource Strain: Low Risk  (4/17/2023)    Overall Financial Resource Strain (CARDIA)     Difficulty of Paying Living Expenses: Not hard at all   Food Insecurity: No Food Insecurity (4/29/2024)    Hunger Vital Sign     Worried About Running Out of Food in the Last Year: Never true     Ran Out of Food in the Last Year: Never true   Transportation Needs: No Transportation Needs (4/29/2024)    PRAPARE - Transportation     Lack of Transportation (Medical): No     Lack of Transportation (Non-Medical): No   Physical Activity: Not on file   Stress: Not on file   Social Connections: Not on file   Intimate Partner Violence: Not on file   Housing Stability: Low Risk  (4/29/2024)    Housing Stability Vital Sign     Unable to Pay for Housing in the Last Year: No     Number of Places Lived in the Last Year: 1     Unstable Housing in the Last Year: No      Medications and Allergies:     Current Outpatient Medications   Medication Sig Dispense Refill    amLODIPine (NORVASC) 5 mg tablet Take 1 tablet (5 mg total) by mouth 2 (two) times a day 180 tablet 1    aspirin (ECOTRIN LOW STRENGTH) 81 mg EC tablet Take 1 tablet (81 mg total) by  mouth daily 90 tablet 3    atorvastatin (LIPITOR) 40 mg tablet Take 1 tablet (40 mg total) by mouth daily 90 tablet 1    estradiol (ESTRACE VAGINAL) 0.1 mg/g vaginal cream Apply a pea sized amount to vulva twice weekly 42.5 g 0    ezetimibe (ZETIA) 10 mg tablet Take 1 tablet (10 mg total) by mouth daily 90 tablet 1    losartan (COZAAR) 50 mg tablet Take 1 tablet (50 mg total) by mouth daily 90 tablet 1    mirtazapine (REMERON) 15 mg tablet Take 1 tablet (15 mg total) by mouth daily at bedtime 90 tablet 1    nitroglycerin (NITROSTAT) 0.4 mg SL tablet Place 1 tablet (0.4 mg total) under the tongue every 5 (five) minutes as needed for chest pain 100 tablet 1    clobetasol (TEMOVATE) 0.05 % ointment Apply topically 2 (two) times a week (Patient not taking: Reported on 10/27/2023) 30 g 0     No current facility-administered medications for this visit.     No Known Allergies   Immunizations:     Immunization History   Administered Date(s) Administered    COVID-19 PFIZER VACCINE 0.3 ML IM 03/06/2021, 03/25/2021, 11/13/2021, 10/04/2023    INFLUENZA 10/04/2023      Health Maintenance:     There are no preventive care reminders to display for this patient.      Topic Date Due    Pneumococcal Vaccine: 65+ Years (1 of 1 - PCV) Never done    COVID-19 Vaccine (7 - 2023-24 season) 11/29/2023      Medicare Screening Tests and Risk Assessments:     Jo is here for her Subsequent Wellness visit.     Health Risk Assessment:   Patient rates overall health as fair. Patient feels that their physical health rating is slightly worse. Patient is satisfied with their life. Eyesight was rated as slightly worse. Hearing was rated as slightly worse. Patient feels that their emotional and mental health rating is same. Patients states they are never, rarely angry. Patient states they are sometimes unusually tired/fatigued. Pain experienced in the last 7 days has been none. Patient states that she has experienced no weight loss or gain in last 6  months.     Depression Screening:   PHQ-2 Score: 2      Fall Risk Screening:   In the past year, patient has experienced: no history of falling in past year      Urinary Incontinence Screening:   Patient has leaked urine accidently in the last six months.     Home Safety:  Patient has trouble with stairs inside or outside of their home. Patient has working smoke alarms and has working carbon monoxide detector. Home safety hazards include: none.     Nutrition:   Current diet is Regular.     Medications:   Patient is currently taking over-the-counter supplements. OTC medications include: see medication list. Patient is able to manage medications.     Activities of Daily Living (ADLs)/Instrumental Activities of Daily Living (IADLs):   Walk and transfer into and out of bed and chair?: Yes  Dress and groom yourself?: Yes    Bathe or shower yourself?: Yes    Feed yourself? Yes  Do your laundry/housekeeping?: No  Manage your money, pay your bills and track your expenses?: No  Make your own meals?: No    Do your own shopping?: No    ADL comments: Daughter and son-in-law    Previous Hospitalizations:   Any hospitalizations or ED visits within the last 12 months?: No      Advance Care Planning:   Living will: Yes    Advanced directive: Yes      Cognitive Screening:   Provider or family/friend/caregiver concerned regarding cognition?: No    PREVENTIVE SCREENINGS      Cardiovascular Screening:    General: History Lipid Disorder and Screening Current      Diabetes Screening:     General: Screening Current      Cervical Cancer Screening:    General: Screening Not Indicated      Osteoporosis Screening:    General: Screening Not Indicated      Abdominal Aortic Aneurysm (AAA) Screening:        General: Screening Not Indicated      Lung Cancer Screening:     General: Screening Not Indicated    Screening, Brief Intervention, and Referral to Treatment (SBIRT)    Screening  Typical number of drinks in a day: 0  Typical number of drinks  "in a week: 0  Interpretation: Low risk drinking behavior.    Single Item Drug Screening:  How often have you used an illegal drug (including marijuana) or a prescription medication for non-medical reasons in the past year? never    Single Item Drug Screen Score: 0  Interpretation: Negative screen for possible drug use disorder    No results found.     Physical Exam:     /50 (BP Location: Right arm, Patient Position: Sitting, Cuff Size: Standard)   Pulse 66   Temp 98.4 °F (36.9 °C) (Temporal)   Ht 5' 1\" (1.549 m)   Wt 43.5 kg (96 lb)   SpO2 94%   BMI 18.14 kg/m²     Physical Exam  Vitals and nursing note reviewed.   Constitutional:       General: She is not in acute distress.     Appearance: She is well-developed. She is not diaphoretic.      Comments: Thin  white   female   HENT:      Head: Normocephalic and atraumatic.      Right Ear: External ear normal. There is impacted cerumen.      Left Ear: External ear normal. There is impacted cerumen.   Eyes:      Conjunctiva/sclera: Conjunctivae normal.      Pupils: Pupils are equal, round, and reactive to light.   Neck:      Thyroid: No thyromegaly.      Vascular: Carotid bruit (left) present.   Cardiovascular:      Rate and Rhythm: Normal rate and regular rhythm.      Heart sounds: Murmur heard.      No friction rub.   Pulmonary:      Effort: Pulmonary effort is normal. No respiratory distress.      Breath sounds: Normal breath sounds. No wheezing.   Abdominal:      General: Bowel sounds are normal. There is no distension.      Palpations: Abdomen is soft. There is no mass.      Tenderness: There is no abdominal tenderness.   Musculoskeletal:      Right lower leg: No edema.      Left lower leg: No edema.   Lymphadenopathy:      Cervical: No cervical adenopathy.   Skin:     General: Skin is warm and dry.   Neurological:      General: No focal deficit present.      Mental Status: She is alert and oriented to person, place, and time.      Motor: Tremor present. "   Psychiatric:         Mood and Affect: Mood normal.         Speech: Speech normal.         Behavior: Behavior normal.         Thought Content: Thought content normal.         Cognition and Memory: Cognition is impaired. She exhibits impaired recent memory and impaired remote memory.         Judgment: Judgment normal.          Steffanie Bennett PA-C

## 2024-05-17 ENCOUNTER — TRANSCRIBE ORDERS (OUTPATIENT)
Dept: VASCULAR SURGERY | Facility: CLINIC | Age: 84
End: 2024-05-17

## 2024-05-17 DIAGNOSIS — I65.23 BILATERAL CAROTID ARTERY STENOSIS: Primary | ICD-10-CM

## 2024-07-15 ENCOUNTER — APPOINTMENT (OUTPATIENT)
Dept: LAB | Facility: MEDICAL CENTER | Age: 84
End: 2024-07-15
Payer: COMMERCIAL

## 2024-07-15 DIAGNOSIS — D63.8 ANEMIA OF CHRONIC DISEASE: ICD-10-CM

## 2024-07-15 LAB
ANION GAP SERPL CALCULATED.3IONS-SCNC: 9 MMOL/L (ref 4–13)
BASOPHILS # BLD AUTO: 0.01 THOUSANDS/ÂΜL (ref 0–0.1)
BASOPHILS NFR BLD AUTO: 0 % (ref 0–1)
BUN SERPL-MCNC: 28 MG/DL (ref 5–25)
CALCIUM SERPL-MCNC: 10 MG/DL (ref 8.4–10.2)
CHLORIDE SERPL-SCNC: 102 MMOL/L (ref 96–108)
CO2 SERPL-SCNC: 27 MMOL/L (ref 21–32)
CREAT SERPL-MCNC: 1.76 MG/DL (ref 0.6–1.3)
EOSINOPHIL # BLD AUTO: 0.06 THOUSAND/ÂΜL (ref 0–0.61)
EOSINOPHIL NFR BLD AUTO: 2 % (ref 0–6)
ERYTHROCYTE [DISTWIDTH] IN BLOOD BY AUTOMATED COUNT: 13.3 % (ref 11.6–15.1)
FERRITIN SERPL-MCNC: 164 NG/ML (ref 11–307)
GFR SERPL CREATININE-BSD FRML MDRD: 26 ML/MIN/1.73SQ M
GLUCOSE P FAST SERPL-MCNC: 99 MG/DL (ref 65–99)
HCT VFR BLD AUTO: 37.6 % (ref 34.8–46.1)
HGB BLD-MCNC: 11.8 G/DL (ref 11.5–15.4)
IMM GRANULOCYTES # BLD AUTO: 0.01 THOUSAND/UL (ref 0–0.2)
IMM GRANULOCYTES NFR BLD AUTO: 0 % (ref 0–2)
IRON SATN MFR SERPL: 35 % (ref 15–50)
IRON SERPL-MCNC: 98 UG/DL (ref 50–212)
LYMPHOCYTES # BLD AUTO: 0.4 THOUSANDS/ÂΜL (ref 0.6–4.47)
LYMPHOCYTES NFR BLD AUTO: 15 % (ref 14–44)
MCH RBC QN AUTO: 30.2 PG (ref 26.8–34.3)
MCHC RBC AUTO-ENTMCNC: 31.4 G/DL (ref 31.4–37.4)
MCV RBC AUTO: 96 FL (ref 82–98)
MONOCYTES # BLD AUTO: 0.24 THOUSAND/ÂΜL (ref 0.17–1.22)
MONOCYTES NFR BLD AUTO: 9 % (ref 4–12)
NEUTROPHILS # BLD AUTO: 1.94 THOUSANDS/ÂΜL (ref 1.85–7.62)
NEUTS SEG NFR BLD AUTO: 74 % (ref 43–75)
NRBC BLD AUTO-RTO: 0 /100 WBCS
PLATELET # BLD AUTO: 183 THOUSANDS/UL (ref 149–390)
PMV BLD AUTO: 10.5 FL (ref 8.9–12.7)
POTASSIUM SERPL-SCNC: 4.7 MMOL/L (ref 3.5–5.3)
RBC # BLD AUTO: 3.91 MILLION/UL (ref 3.81–5.12)
SODIUM SERPL-SCNC: 138 MMOL/L (ref 135–147)
TIBC SERPL-MCNC: 280 UG/DL (ref 250–450)
UIBC SERPL-MCNC: 182 UG/DL (ref 155–355)
WBC # BLD AUTO: 2.66 THOUSAND/UL (ref 4.31–10.16)

## 2024-07-15 PROCEDURE — 83540 ASSAY OF IRON: CPT

## 2024-07-15 PROCEDURE — 83550 IRON BINDING TEST: CPT

## 2024-07-15 PROCEDURE — 82728 ASSAY OF FERRITIN: CPT

## 2024-07-15 PROCEDURE — 36415 COLL VENOUS BLD VENIPUNCTURE: CPT

## 2024-07-23 ENCOUNTER — RA CDI HCC (OUTPATIENT)
Dept: OTHER | Facility: HOSPITAL | Age: 84
End: 2024-07-23

## 2024-07-29 ENCOUNTER — OFFICE VISIT (OUTPATIENT)
Dept: FAMILY MEDICINE CLINIC | Facility: CLINIC | Age: 84
End: 2024-07-29
Payer: COMMERCIAL

## 2024-07-29 VITALS
WEIGHT: 86.2 LBS | HEART RATE: 65 BPM | DIASTOLIC BLOOD PRESSURE: 52 MMHG | TEMPERATURE: 98 F | OXYGEN SATURATION: 98 % | HEIGHT: 60 IN | SYSTOLIC BLOOD PRESSURE: 100 MMHG | BODY MASS INDEX: 16.92 KG/M2

## 2024-07-29 DIAGNOSIS — E78.5 HYPERLIPIDEMIA, UNSPECIFIED HYPERLIPIDEMIA TYPE: ICD-10-CM

## 2024-07-29 DIAGNOSIS — I25.119 CORONARY ARTERY DISEASE INVOLVING NATIVE CORONARY ARTERY OF NATIVE HEART WITH ANGINA PECTORIS (HCC): Primary | ICD-10-CM

## 2024-07-29 DIAGNOSIS — I77.9 BILATERAL CAROTID ARTERY DISEASE, UNSPECIFIED TYPE (HCC): ICD-10-CM

## 2024-07-29 DIAGNOSIS — E78.5 DYSLIPIDEMIA: ICD-10-CM

## 2024-07-29 DIAGNOSIS — R63.4 WEIGHT LOSS, UNINTENTIONAL: ICD-10-CM

## 2024-07-29 DIAGNOSIS — I10 ESSENTIAL HYPERTENSION: ICD-10-CM

## 2024-07-29 DIAGNOSIS — G25.0 TREMOR, ESSENTIAL: ICD-10-CM

## 2024-07-29 DIAGNOSIS — F03.918 DEMENTIA WITH OTHER BEHAVIORAL DISTURBANCE, UNSPECIFIED DEMENTIA SEVERITY, UNSPECIFIED DEMENTIA TYPE (HCC): ICD-10-CM

## 2024-07-29 DIAGNOSIS — N18.4 BENIGN HYPERTENSION WITH CKD (CHRONIC KIDNEY DISEASE) STAGE IV (HCC): ICD-10-CM

## 2024-07-29 DIAGNOSIS — I12.9 BENIGN HYPERTENSION WITH CKD (CHRONIC KIDNEY DISEASE) STAGE IV (HCC): ICD-10-CM

## 2024-07-29 DIAGNOSIS — E46 PROTEIN-CALORIE MALNUTRITION, UNSPECIFIED SEVERITY (HCC): ICD-10-CM

## 2024-07-29 PROCEDURE — 99214 OFFICE O/P EST MOD 30 MIN: CPT | Performed by: PHYSICIAN ASSISTANT

## 2024-07-29 PROCEDURE — 3074F SYST BP LT 130 MM HG: CPT | Performed by: PHYSICIAN ASSISTANT

## 2024-07-29 PROCEDURE — 3078F DIAST BP <80 MM HG: CPT | Performed by: PHYSICIAN ASSISTANT

## 2024-07-29 PROCEDURE — G2211 COMPLEX E/M VISIT ADD ON: HCPCS | Performed by: PHYSICIAN ASSISTANT

## 2024-07-29 PROCEDURE — 1159F MED LIST DOCD IN RCRD: CPT | Performed by: PHYSICIAN ASSISTANT

## 2024-07-29 PROCEDURE — 1160F RVW MEDS BY RX/DR IN RCRD: CPT | Performed by: PHYSICIAN ASSISTANT

## 2024-07-29 RX ORDER — ATORVASTATIN CALCIUM 40 MG/1
40 TABLET, FILM COATED ORAL DAILY
Qty: 90 TABLET | Refills: 1 | Status: SHIPPED | OUTPATIENT
Start: 2024-07-29

## 2024-07-29 RX ORDER — LOSARTAN POTASSIUM 50 MG/1
50 TABLET ORAL DAILY
Qty: 90 TABLET | Refills: 1 | Status: SHIPPED | OUTPATIENT
Start: 2024-07-29

## 2024-07-29 RX ORDER — EZETIMIBE 10 MG/1
10 TABLET ORAL DAILY
Qty: 90 TABLET | Refills: 1 | Status: SHIPPED | OUTPATIENT
Start: 2024-07-29

## 2024-07-29 RX ORDER — MIRTAZAPINE 15 MG/1
15 TABLET, FILM COATED ORAL
Qty: 90 TABLET | Refills: 1 | Status: SHIPPED | OUTPATIENT
Start: 2024-07-29

## 2024-07-29 RX ORDER — AMLODIPINE BESYLATE 5 MG/1
5 TABLET ORAL 2 TIMES DAILY
Qty: 180 TABLET | Refills: 1 | Status: SHIPPED | OUTPATIENT
Start: 2024-07-29

## 2024-07-29 NOTE — PROGRESS NOTES
Assessment/Plan:     Diagnoses and all orders for this visit:    Coronary artery disease involving native coronary artery of native heart with angina pectoris (HCC)  Comments:  Patient denies angina.  There is no signs of congestive heart failure    Bilateral carotid artery disease, unspecified type (HCC)  Comments:  Being followed by vascular.  No headaches or blurred vision at this time    Benign hypertension with CKD (chronic kidney disease) stage IV (HCC)  Comments:  Blood pressure is well-controlled continue current regimen.    Protein-calorie malnutrition, unspecified severity ([facility])  Comments:  Patient continues to lose weight.  scans for underlying malignancy declined  Orders:  -     Ambulatory Referral to Social Work Care Management Program; Future    Dyslipidemia  Comments:  Continue statin therapy and Zetia    Weight loss, unintentional  Comments:  scans  d eclined  Orders:  -     Ambulatory Referral to Social Work Care Management Program; Future    Tremor, essential  Comments:  Continue Remeron  Orders:  -     mirtazapine (REMERON) 15 mg tablet; Take 1 tablet (15 mg total) by mouth daily at bedtime    Dementia with other behavioral disturbance, unspecified dementia severity, unspecified dementia type ([facility])  -     Ambulatory Referral to Social Work Care Management Program; Future    Tremor, essential  Comments:  on remeron  Orders:  -     mirtazapine (REMERON) 15 mg tablet; Take 1 tablet (15 mg total) by mouth daily at bedtime    Essential hypertension  Comments:  rto 6 month recheck  Orders:  -     ezetimibe (ZETIA) 10 mg tablet; Take 1 tablet (10 mg total) by mouth daily  -     losartan (COZAAR) 50 mg tablet; Take 1 tablet (50 mg total) by mouth daily  -     amLODIPine (NORVASC) 5 mg tablet; Take 1 tablet (5 mg total) by mouth 2 (two) times a day    Hyperlipidemia, unspecified hyperlipidemia type  Comments:  on statin due for lab  Orders:  -     atorvastatin (LIPITOR) 40 mg tablet; Take 1 tablet (40 mg  total) by mouth daily          Subjective:      Patient ID: Jo Jason is a 83 y.o. female.    Patient presents in the office with her sister for follow-up chronic conditions.  She has coronary artery disease and hypertension and chronic kidney disease.  She takes a baby aspirin daily.  She is on losartan 50 mg and amlodipine 5 mg.  Her blood pressure is well-controlled.  Peripheral artery disease.  Her carotids are being monitored by vascular surgeon.  Hyperlipidemia she is on Zetia and atorvastatin.  Essential tremor for which she takes Remeron 15 mg at night.  Chronic kidney disease shows BUN 28 creatinine 1.16.  GFR is 26.  Patient continues to lose weight.  She is lost 10 pounds since April.  Patient does not eat.  She eats at least 2 meals a day.  She is not doing omental drinks such as boost.  Her labs show normal CBC for decreased white count.  Her iron and ferritin are normal..  There also seems to be a decline in her memory.  Her recent memory is pretty much nonexistent..  Discussed with sister who is present.  They need to have a living will in place so she can make medical decisions for her.  She is unable to make decision at this time.  For the weight loss I would like to do CAT scan abdomen pelvis and chest.  Declined today by patient and sister.  Will place social work referral for information  a bout   assisted  living/  nursing  home          The following portions of the patient's history were reviewed and updated as appropriate: She  has a past medical history of Dyslipidemia, Hypertension, and Tremor.  Current Outpatient Medications   Medication Sig Dispense Refill    amLODIPine (NORVASC) 5 mg tablet Take 1 tablet (5 mg total) by mouth 2 (two) times a day 180 tablet 1    aspirin (ECOTRIN LOW STRENGTH) 81 mg EC tablet Take 1 tablet (81 mg total) by mouth daily 90 tablet 3    atorvastatin (LIPITOR) 40 mg tablet Take 1 tablet (40 mg total) by mouth daily 90 tablet 1    clobetasol (TEMOVATE)  0.05 % ointment Apply topically 2 (two) times a week 30 g 0    estradiol (ESTRACE VAGINAL) 0.1 mg/g vaginal cream Apply a pea sized amount to vulva twice weekly 42.5 g 0    ezetimibe (ZETIA) 10 mg tablet Take 1 tablet (10 mg total) by mouth daily 90 tablet 1    losartan (COZAAR) 50 mg tablet Take 1 tablet (50 mg total) by mouth daily 90 tablet 1    mirtazapine (REMERON) 15 mg tablet Take 1 tablet (15 mg total) by mouth daily at bedtime 90 tablet 1    nitroglycerin (NITROSTAT) 0.4 mg SL tablet Place 1 tablet (0.4 mg total) under the tongue every 5 (five) minutes as needed for chest pain 100 tablet 1     No current facility-administered medications for this visit.     She has No Known Allergies..    Review of Systems   Constitutional:  Negative for activity change and unexpected weight change.   HENT:  Negative for ear pain and sore throat.    Eyes:  Negative for visual disturbance.   Respiratory:  Negative for cough, shortness of breath and wheezing.    Cardiovascular:  Negative for chest pain and leg swelling.   Gastrointestinal:  Negative for abdominal pain, blood in stool, constipation, diarrhea, nausea and vomiting.   Genitourinary:  Positive for urgency. Negative for difficulty urinating.   Musculoskeletal:  Positive for gait problem. Negative for arthralgias and myalgias.   Skin:  Negative for rash.   Neurological:  Negative for dizziness, syncope, light-headedness and headaches.   Psychiatric/Behavioral:  Negative for self-injury, sleep disturbance and suicidal ideas. The patient is not nervous/anxious.          Objective:        Physical Exam  Vitals and nursing note reviewed.   Constitutional:       General: She is not in acute distress.     Appearance: She is not ill-appearing.      Comments: Ambulates   with  cane   Has   walker   unable   to  use.   Thin female.  Is lost 10 more pounds.  Continues to decline medically.   HENT:      Head: Normocephalic and atraumatic.

## 2024-07-30 ENCOUNTER — PATIENT OUTREACH (OUTPATIENT)
Dept: CASE MANAGEMENT | Facility: OTHER | Age: 84
End: 2024-07-30

## 2024-07-30 NOTE — PROGRESS NOTES
ISABEL RACHEL received request to contact patient's sister Mariposa to provide support regarding care options for patient with diagnosis of dementia.    Call to Mariposa and left marisel.  Will await return call and continue attempts to contact Mariposa to provide psychosocial support.     Received return call from patient's sister Mariposa.  She reports that she is interested in pursuing placement for patient either in assisted living or snf.  She reports that patient resides with she and her  and Mariposa is unable to continue providing care to her as  also has care needs.      Reviewed assisted living and snf placement process. Mariposa reports that patient has limited finances. Mariposa is interested in contacting assisted living facilities to investigate cost and options for patient and list of same has been mailed to her.  She is also interested in applying to [facility] and online referral has been sent to [facility] per her request.     Supportive counseling provided to Mariposa who denies further needs at this time. ISABEL RACHEL will follow up with Mariposa and assist with placement needs as appropriate.

## 2024-08-13 ENCOUNTER — TELEPHONE (OUTPATIENT)
Dept: FAMILY MEDICINE CLINIC | Facility: CLINIC | Age: 84
End: 2024-08-13

## 2024-08-13 PROBLEM — G31.84 MCI (MILD COGNITIVE IMPAIRMENT) WITH MEMORY LOSS: Status: ACTIVE | Noted: 2024-08-13

## 2024-08-13 NOTE — TELEPHONE ENCOUNTER
Called pt to let her know that Steffanie completed her medical evaluation form and it is ready for pickup.

## 2024-08-14 ENCOUNTER — PATIENT OUTREACH (OUTPATIENT)
Dept: CASE MANAGEMENT | Facility: OTHER | Age: 84
End: 2024-08-14

## 2024-08-14 NOTE — PROGRESS NOTES
ISABEL RACHEL placed call to patient's sister, Mariposa, at this time. Mariposa states she is still pursuing placement for pt at [facility]. Had necessary supporting Medical Evaluation  paperwork completed by PCP already. Mariposa will be contacting [facility] admissions now that she has that documentation to complete next steps. Phone number for [facility] provided.Mariposa denies needing any additional support at this time. ISABEL RACHEL will close out referral and be available if needed.

## 2024-08-29 ENCOUNTER — TELEPHONE (OUTPATIENT)
Age: 84
End: 2024-08-29

## 2024-09-09 ENCOUNTER — HOSPITAL ENCOUNTER (OUTPATIENT)
Dept: VASCULAR ULTRASOUND | Facility: HOSPITAL | Age: 84
Discharge: HOME | End: 2024-09-09
Payer: COMMERCIAL

## 2024-09-09 DIAGNOSIS — I65.23 BILATERAL CAROTID ARTERY STENOSIS: ICD-10-CM

## 2024-09-09 PROCEDURE — 93880 EXTRACRANIAL BILAT STUDY: CPT | Performed by: SURGERY

## 2024-09-09 PROCEDURE — 93880 EXTRACRANIAL BILAT STUDY: CPT

## 2024-09-20 ENCOUNTER — OFFICE VISIT (OUTPATIENT)
Dept: CARDIOLOGY CLINIC | Facility: CLINIC | Age: 84
End: 2024-09-20
Payer: COMMERCIAL

## 2024-09-20 VITALS
HEART RATE: 62 BPM | OXYGEN SATURATION: 97 % | SYSTOLIC BLOOD PRESSURE: 124 MMHG | HEIGHT: 60 IN | WEIGHT: 87.9 LBS | BODY MASS INDEX: 17.26 KG/M2 | DIASTOLIC BLOOD PRESSURE: 52 MMHG

## 2024-09-20 DIAGNOSIS — Z98.890 HISTORY OF BILATERAL CAROTID ENDARTERECTOMY: ICD-10-CM

## 2024-09-20 DIAGNOSIS — E78.5 DYSLIPIDEMIA: ICD-10-CM

## 2024-09-20 DIAGNOSIS — N18.30 STAGE 3 CHRONIC KIDNEY DISEASE, UNSPECIFIED WHETHER STAGE 3A OR 3B CKD (HCC): ICD-10-CM

## 2024-09-20 DIAGNOSIS — I12.9 BENIGN HYPERTENSION WITH CKD (CHRONIC KIDNEY DISEASE) STAGE IV (HCC): ICD-10-CM

## 2024-09-20 DIAGNOSIS — N18.4 BENIGN HYPERTENSION WITH CKD (CHRONIC KIDNEY DISEASE) STAGE IV (HCC): ICD-10-CM

## 2024-09-20 DIAGNOSIS — I25.119 CORONARY ARTERY DISEASE INVOLVING NATIVE CORONARY ARTERY OF NATIVE HEART WITH ANGINA PECTORIS (HCC): ICD-10-CM

## 2024-09-20 PROCEDURE — 99214 OFFICE O/P EST MOD 30 MIN: CPT

## 2024-09-20 PROCEDURE — 93000 ELECTROCARDIOGRAM COMPLETE: CPT

## 2024-09-20 RX ORDER — ACETAMINOPHEN 325 MG/1
650 TABLET ORAL EVERY 4 HOURS PRN
COMMUNITY

## 2024-09-20 RX ORDER — POLYETHYLENE GLYCOL 3350 17 G/17G
17 POWDER, FOR SOLUTION ORAL AS NEEDED
COMMUNITY

## 2024-09-20 RX ORDER — SENNOSIDES A AND B 8.6 MG/1
1 TABLET, FILM COATED ORAL AS NEEDED
COMMUNITY

## 2024-09-20 NOTE — PROGRESS NOTES
Jo Jason  1940  [id number]  [facility]  [address]  [address]  [facility] [address] [phone]    1. Coronary artery disease involving native coronary artery of native heart with angina pectoris (HCC)  POCT ECG      2. Benign hypertension with CKD (chronic kidney disease) stage IV (HCC)        3. Stage 3 chronic kidney disease, unspecified whether stage 3a or 3b CKD (HCC)  Basic metabolic panel      4. History of bilateral carotid endarterectomy        5. Dyslipidemia            Summary/Discussion:  Coronary artery disease:  - with prior CABG x 4 in 2008  - nm rx stress test (8/2021): normal   - without symptoms of angina   - historically not on beta-blocker  - continue aspirin and statin     Hypertension:  - controlled, 124/52  - continue present medication regimen  - lifestyle modification   - close blood pressure monitoring     Dyslipidemia:  - Lipid Profile:    Latest Reference Range & Units 04/08/24 09:41   Cholesterol See Comment mg/dL 122   Triglycerides See Comment mg/dL 69   HDL >=50 mg/dL 52   Non-HDL Cholesterol mg/dl 70   LDL Calculated 0 - 100 mg/dL 56   - acceptable numbers-- continue statin  - encouraged low cholesterol, mediterranean diet, and annual lipid follow up    Hx of bilateral carotid endarterectomy:  - carotid doppler (9/2024): widely patent internal carotid artery and endarterectomy site   - follows with vascular surgery     CKD:  - creatinine (7/15/2024): 1.76  - baseline creatinine appears to be around 1.2-1.3  - repeat BMP    Interval History: Jo Jason is a 83 y.o. year old female with history mentioned in problem list who presents to the office today for routine follow up.     She presents to the office today from [facility] with no significant cardiac complaints however upon exam she is only alert and oriented to herself with known hx of cognitive impairment. She does not appear to be active at baseline due  to ambulatory dysfunction with use of a wheelchair.  Not appear to be in any acute distress and overall appears clinically stable on exam.      She will RTO in 6 months with Dr. Simon or sooner if necessary. She will call with any concerns.       Reassess   Medical Problems       Problem List       Coronary artery disease involving native coronary artery of native heart with angina pectoris (HCC)    Benign hypertension with CKD (chronic kidney disease) stage IV ([facility])    Lab Results   Component Value Date    EGFR 26 07/15/2024    EGFR 24 04/08/2024    EGFR 28 10/03/2023    CREATININE 1.76 (H) 07/15/2024    CREATININE 1.89 (H) 04/08/2024    CREATININE 1.66 (H) 10/03/2023         Dyslipidemia    History of bilateral carotid endarterectomy    Tremor, essential    Protein-calorie malnutrition, unspecified severity ([facility])    Malnutrition Findings:                                 BMI Findings:           Body mass index is 17.17 kg/m².         Chronic renal disease, stage IV ([facility])    Lab Results   Component Value Date    EGFR 26 07/15/2024    EGFR 24 04/08/2024    EGFR 28 10/03/2023    CREATININE 1.76 (H) 07/15/2024    CREATININE 1.89 (H) 04/08/2024    CREATININE 1.66 (H) 10/03/2023         Bilateral carotid artery disease, unspecified type ([facility])    MCI (mild cognitive impairment) with memory loss        Past Medical History:   Diagnosis Date    Dyslipidemia     Hypertension     Tremor      Social History     Socioeconomic History    Marital status: Single     Spouse name: Not on file    Number of children: Not on file    Years of education: Not on file    Highest education level: Not on file   Occupational History    Not on file   Tobacco Use    Smoking status: Never     Passive exposure: Past    Smokeless tobacco: Never   Vaping Use    Vaping status: Never Used   Substance and Sexual Activity    Alcohol use: Never    Drug use: Never    Sexual activity: Not on file   Other Topics Concern    Not on file   Social History  Narrative    Not on file     Social Determinants of Health     Financial Resource Strain: Low Risk  (4/17/2023)    Overall Financial Resource Strain (CARDIA)     Difficulty of Paying Living Expenses: Not hard at all   Food Insecurity: No Food Insecurity (4/29/2024)    Hunger Vital Sign     Worried About Running Out of Food in the Last Year: Never true     Ran Out of Food in the Last Year: Never true   Transportation Needs: No Transportation Needs (4/29/2024)    PRAPARE - Transportation     Lack of Transportation (Medical): No     Lack of Transportation (Non-Medical): No   Physical Activity: Not on file   Stress: Not on file   Social Connections: Not on file   Intimate Partner Violence: Not on file   Housing Stability: Low Risk  (4/29/2024)    Housing Stability Vital Sign     Unable to Pay for Housing in the Last Year: No     Number of Times Moved in the Last Year: 1     Homeless in the Last Year: No      Family History   Problem Relation Age of Onset    Coronary artery disease Mother      Past Surgical History:   Procedure Laterality Date    CARDIAC SURGERY      CAROTID ARTERY ANGIOPLASTY      CATARACT EXTRACTION Bilateral     CLOSED MANIPULATION SHOULDER         Current Outpatient Medications:     acetaminophen (TYLENOL) 325 mg tablet, Take 650 mg by mouth every 4 (four) hours as needed for mild pain, Disp: , Rfl:     amLODIPine (NORVASC) 5 mg tablet, Take 1 tablet (5 mg total) by mouth 2 (two) times a day, Disp: 180 tablet, Rfl: 1    aspirin (ECOTRIN LOW STRENGTH) 81 mg EC tablet, Take 1 tablet (81 mg total) by mouth daily, Disp: 90 tablet, Rfl: 3    atorvastatin (LIPITOR) 40 mg tablet, Take 1 tablet (40 mg total) by mouth daily, Disp: 90 tablet, Rfl: 1    bisacodyl (FLEET) 10 MG/30ML ENEM, Insert 10 mg into the rectum if needed for constipation On day 4 of no BM, Disp: , Rfl:     clobetasol (TEMOVATE) 0.05 % ointment, Apply topically 2 (two) times a week, Disp: 30 g, Rfl: 0    estradiol (ESTRACE VAGINAL) 0.1 mg/g  "vaginal cream, Apply a pea sized amount to vulva twice weekly, Disp: 42.5 g, Rfl: 0    ezetimibe (ZETIA) 10 mg tablet, Take 1 tablet (10 mg total) by mouth daily, Disp: 90 tablet, Rfl: 1    losartan (COZAAR) 50 mg tablet, Take 1 tablet (50 mg total) by mouth daily, Disp: 90 tablet, Rfl: 1    magnesium hydroxide (MILK OF MAGNESIA) 400 mg/5 mL oral suspension, Take 30 mL by mouth daily as needed for constipation Give day 2 of no BM, repeat on day 3 of no BM, Disp: , Rfl:     mirtazapine (REMERON) 15 mg tablet, Take 1 tablet (15 mg total) by mouth daily at bedtime, Disp: 90 tablet, Rfl: 1    nitroglycerin (NITROSTAT) 0.4 mg SL tablet, Place 1 tablet (0.4 mg total) under the tongue every 5 (five) minutes as needed for chest pain, Disp: 100 tablet, Rfl: 1    polyethylene glycol (MIRALAX) 17 g packet, Take 17 g by mouth if needed (constipation) Give day 2 of no BM, repeat day 3 of no BM, Disp: , Rfl:     senna (SENOKOT) 8.6 MG tablet, Take 1 tablet by mouth if needed for constipation (Give day 2 of no BM, repeat day 3 of no BM), Disp: , Rfl:   No Known Allergies    Labs:     Chemistry        Component Value Date/Time    K 4.7 07/15/2024 0947     07/15/2024 0947    CO2 27 07/15/2024 0947    BUN 28 (H) 07/15/2024 0947    CREATININE 1.76 (H) 07/15/2024 0947        Component Value Date/Time    CALCIUM 10.0 07/15/2024 0947    ALKPHOS 71 04/08/2024 0941    AST 30 04/08/2024 0941    ALT 22 04/08/2024 0941            No results found for: \"CHOL\"  Lab Results   Component Value Date    HDL 52 04/08/2024    HDL 54 10/03/2023    HDL 65 04/04/2023     Lab Results   Component Value Date    LDLCALC 56 04/08/2024    LDLCALC 62 10/03/2023    LDLCALC 62 04/04/2023     Lab Results   Component Value Date    TRIG 69 04/08/2024    TRIG 67 10/03/2023    TRIG 51 04/04/2023     No results found for: \"CHOLHDL\"    Imaging: VAS carotid complete study    Result Date: 9/9/2024  Narrative:  [facility] REPORT CLINICAL: Indications:  " Yearly surveillance of carotid artery disease.  Patient is asymptomatic at this time. Operative History: 2008-01-25 CABG 2008-01-23 Lt CEA Risk Factors The patient has history of HTN, Hyperlipidemia and CKD. Clinical Right Pressure:  111/73 mm Hg, Left Pressure:  113/57 mm Hg.  FINDINGS:  Right        Impression  PSV  EDV (cm/s)  Direction of Flow  Ratio  Dist. ICA                187          23                      2.21  Mid. ICA                 102          11                      1.21  Prox. ICA    1 - 49%      91          11                      1.07  Dist CCA                  87          17                            Mid CCA                   85          16                      0.79  Prox CCA                 108          16                            Ext Carotid  Severe      349          35                      4.12  Prox Vert                 55          11  Antegrade                 Subclavian               195           9                             Left         Impression     PSV  EDV (cm/s)  Direction of Flow  Ratio  Dist. ICA                    91          18                      0.73  Mid. ICA                     43           9                      0.35  Prox. ICA    Widely Patent  115          11                      0.93  Dist CCA                    118          10                            Mid CCA                     123          10                      0.77  Prox CCA                    160          16                            Ext Carotid                  96           0                      0.78  Prox Vert                    74          12  Antegrade                 Subclavian                  204          14                               CONCLUSION: Impression RIGHT: There is < 50% stenosis noted in the internal carotid artery. Plaque is heterogenous and irregular. There is a severe stenosis in the external carotid artery. Vertebral artery flow is antegrade. There is no significant subclavian  artery disease.  LEFT: Widely patent internal carotid artery and endarterectomy site. Vertebral artery flow is antegrade. There is no significant subclavian artery disease.  Compared to previous study on 06/14/2023, there is no change. Recommend repeat testing in 12 month as per protocol unless otherwise indicated.  SIGNATURE: Electronically Signed by: DANICA MARCOS on 2024-09-09 04:02:29 PM      ECG: Sinus rhythm with premature atrial complexes, rightward axis    Review of Systems   Unable to perform ROS: Dementia       Vitals:    09/20/24 0932   BP: 124/52   Pulse: 62   SpO2: 97%     Vitals:    09/20/24 0932   Weight: 39.9 kg (87 lb 14.4 oz)     Height: 5' (152.4 cm)   Body mass index is 17.17 kg/m².    Physical Exam  Vitals and nursing note reviewed.   Constitutional:       General: She is not in acute distress.  HENT:      Head: Normocephalic.      Nose: Nose normal.      Mouth/Throat:      Mouth: Mucous membranes are moist.      Pharynx: Oropharynx is clear.   Cardiovascular:      Rate and Rhythm: Normal rate and regular rhythm.      Heart sounds: No murmur heard.  Pulmonary:      Breath sounds: Decreased breath sounds present.   Musculoskeletal:      Cervical back: Normal range of motion.      Right lower leg: No edema.      Left lower leg: No edema.   Skin:     General: Skin is warm and dry.   Neurological:      Mental Status: She is alert.      Motor: Weakness present.      Gait: Gait abnormal.      Comments: AxOx1   Psychiatric:         Mood and Affect: Mood normal.         Behavior: Behavior normal.

## 2024-10-22 DIAGNOSIS — I10 ESSENTIAL HYPERTENSION: ICD-10-CM

## 2024-10-23 RX ORDER — AMLODIPINE BESYLATE 5 MG/1
5 TABLET ORAL 2 TIMES DAILY
Qty: 180 TABLET | Refills: 0 | Status: SHIPPED | OUTPATIENT
Start: 2024-10-23

## 2024-11-13 ENCOUNTER — OFFICE VISIT (OUTPATIENT)
Dept: VASCULAR SURGERY | Facility: CLINIC | Age: 84
End: 2024-11-13
Payer: COMMERCIAL

## 2024-11-13 VITALS
BODY MASS INDEX: 17.17 KG/M2 | HEART RATE: 60 BPM | SYSTOLIC BLOOD PRESSURE: 118 MMHG | DIASTOLIC BLOOD PRESSURE: 64 MMHG | HEIGHT: 60 IN

## 2024-11-13 DIAGNOSIS — I77.9 BILATERAL CAROTID ARTERY DISEASE, UNSPECIFIED TYPE (HCC): ICD-10-CM

## 2024-11-13 DIAGNOSIS — I65.23 BILATERAL CAROTID ARTERY STENOSIS: Primary | ICD-10-CM

## 2024-11-13 PROCEDURE — 99213 OFFICE O/P EST LOW 20 MIN: CPT | Performed by: NURSE PRACTITIONER

## 2024-11-13 NOTE — ASSESSMENT & PLAN NOTE
84 yo female with HTN, HLD, CAD s/p CABG '08, CKD 3, tremors, bilateral carotid artery stenosis s/p L CEA '08 and mild cognitive impairment, presents to review imaging / [facility]. Patient last seen '22.     Presents from [facility], [facility].    - Presents without complaints.   - Denies TIA/ stroke like symptoms. Horse voice at baseline  - Reports she remains active. Walks with cane or walker. Presents in wheelchair today    - Carotid duplex - no significant change  NELLIE <50% ICA stenosis  LICA widely patent ICA and endarterectomy site

## 2024-11-13 NOTE — PATIENT INSTRUCTIONS
Carotid duplex in 2 years with return office visit    Continue Aspirin and statin    Call 911/ go to ED with TIA/ stroke like symptoms

## 2024-11-13 NOTE — PROGRESS NOTES
Ambulatory Visit  Name: Jo Jason      : 1940      MRN: [id number]  Encounter Provider: LUKE Gonzalez  Encounter Date: 2024   Encounter department: [facility] [place]    Assessment & Plan  Bilateral carotid artery stenosis  84 yo female with HTN, HLD, CAD s/p CABG '08, CKD 3, tremors, bilateral carotid artery stenosis s/p L CEA '08 and mild cognitive impairment, presents to review imaging / [facility]. Patient last seen '.     Presents from [facility], unaccompanied.    - Presents without complaints.   - Denies TIA/ stroke like symptoms. Horse voice at baseline, R hand tremors.  - Reports she remains active. Walks with cane or walker. Presents in wheelchair today    - Carotid duplex - no significant change  NELLIE <50% ICA stenosis  LICA widely patent ICA and endarterectomy site     Plan:  Carotid duplex in 2 years with return visit  Continue ASA/ statin  Educated on TIA/ stroke like symptoms, .    Orders:    VAS carotid complete study; Future      History of Present Illness   CC: Patient had a CV on 24. Pt denies TIA / CVA symptoms.     Jo Jason is a 83 y.o. female who presents to review imaging and [facility] visit.  Patient presents without complaints from [facility].  She is unaccompanied.  Presents in wheelchair.    See assessment and plan.    History obtained from : patient  Review of Systems   Constitutional: Negative.    HENT: Negative.     Eyes:  Negative for visual disturbance.   Respiratory: Negative.     Cardiovascular: Negative.    Gastrointestinal: Negative.    Endocrine: Negative.    Genitourinary: Negative.    Musculoskeletal:  Positive for gait problem.   Skin: Negative.    Allergic/Immunologic: Negative.    Neurological:  Negative for dizziness, syncope, facial asymmetry, speech difficulty, weakness and numbness.   Hematological: Negative.    Psychiatric/Behavioral: Negative.     I have reviewed and made appropriate changes to the review of systems input by the  medical assistant.    Medical History Reviewed by provider this encounter:  Problems       Past Medical History   Past Medical History:   Diagnosis Date    Dyslipidemia     Hypertension     Tremor      Past Surgical History:   Procedure Laterality Date    CARDIAC SURGERY      CAROTID ARTERY ANGIOPLASTY      CATARACT EXTRACTION Bilateral     CLOSED MANIPULATION SHOULDER       Family History   Problem Relation Age of Onset    Coronary artery disease Mother      Current Outpatient Medications on File Prior to Visit   Medication Sig Dispense Refill    acetaminophen (TYLENOL) 325 mg tablet Take 650 mg by mouth every 4 (four) hours as needed for mild pain      amLODIPine (NORVASC) 5 mg tablet TAKE 1 TABLET BY MOUTH TWICE DAILY 180 tablet 0    aspirin (ECOTRIN LOW STRENGTH) 81 mg EC tablet Take 1 tablet (81 mg total) by mouth daily 90 tablet 3    atorvastatin (LIPITOR) 40 mg tablet Take 1 tablet (40 mg total) by mouth daily 90 tablet 1    bisacodyl (FLEET) 10 MG/30ML ENEM Insert 10 mg into the rectum if needed for constipation On day 4 of no BM      clobetasol (TEMOVATE) 0.05 % ointment Apply topically 2 (two) times a week 30 g 0    estradiol (ESTRACE VAGINAL) 0.1 mg/g vaginal cream Apply a pea sized amount to vulva twice weekly 42.5 g 0    ezetimibe (ZETIA) 10 mg tablet Take 1 tablet (10 mg total) by mouth daily 90 tablet 1    losartan (COZAAR) 50 mg tablet Take 1 tablet (50 mg total) by mouth daily 90 tablet 1    magnesium hydroxide (MILK OF MAGNESIA) 400 mg/5 mL oral suspension Take 30 mL by mouth daily as needed for constipation Give day 2 of no BM, repeat on day 3 of no BM      mirtazapine (REMERON) 15 mg tablet Take 1 tablet (15 mg total) by mouth daily at bedtime 90 tablet 1    nitroglycerin (NITROSTAT) 0.4 mg SL tablet Place 1 tablet (0.4 mg total) under the tongue every 5 (five) minutes as needed for chest pain 100 tablet 1    polyethylene glycol (MIRALAX) 17 g packet Take 17 g by mouth if needed (constipation)  Give day 2 of no BM, repeat day 3 of no BM      senna (SENOKOT) 8.6 MG tablet Take 1 tablet by mouth if needed for constipation (Give day 2 of no BM, repeat day 3 of no BM)       No current facility-administered medications on file prior to visit.   No Known Allergies   Current Outpatient Medications on File Prior to Visit   Medication Sig Dispense Refill    acetaminophen (TYLENOL) 325 mg tablet Take 650 mg by mouth every 4 (four) hours as needed for mild pain      amLODIPine (NORVASC) 5 mg tablet TAKE 1 TABLET BY MOUTH TWICE DAILY 180 tablet 0    aspirin (ECOTRIN LOW STRENGTH) 81 mg EC tablet Take 1 tablet (81 mg total) by mouth daily 90 tablet 3    atorvastatin (LIPITOR) 40 mg tablet Take 1 tablet (40 mg total) by mouth daily 90 tablet 1    bisacodyl (FLEET) 10 MG/30ML ENEM Insert 10 mg into the rectum if needed for constipation On day 4 of no BM      clobetasol (TEMOVATE) 0.05 % ointment Apply topically 2 (two) times a week 30 g 0    estradiol (ESTRACE VAGINAL) 0.1 mg/g vaginal cream Apply a pea sized amount to vulva twice weekly 42.5 g 0    ezetimibe (ZETIA) 10 mg tablet Take 1 tablet (10 mg total) by mouth daily 90 tablet 1    losartan (COZAAR) 50 mg tablet Take 1 tablet (50 mg total) by mouth daily 90 tablet 1    magnesium hydroxide (MILK OF MAGNESIA) 400 mg/5 mL oral suspension Take 30 mL by mouth daily as needed for constipation Give day 2 of no BM, repeat on day 3 of no BM      mirtazapine (REMERON) 15 mg tablet Take 1 tablet (15 mg total) by mouth daily at bedtime 90 tablet 1    nitroglycerin (NITROSTAT) 0.4 mg SL tablet Place 1 tablet (0.4 mg total) under the tongue every 5 (five) minutes as needed for chest pain 100 tablet 1    polyethylene glycol (MIRALAX) 17 g packet Take 17 g by mouth if needed (constipation) Give day 2 of no BM, repeat day 3 of no BM      senna (SENOKOT) 8.6 MG tablet Take 1 tablet by mouth if needed for constipation (Give day 2 of no BM, repeat day 3 of no BM)       No current  facility-administered medications on file prior to visit.      Social History     Tobacco Use    Smoking status: Never     Passive exposure: Past    Smokeless tobacco: Never   Vaping Use    Vaping status: Never Used   Substance and Sexual Activity    Alcohol use: Never    Drug use: Never    Sexual activity: Not on file         Objective     /64 (BP Location: Left arm, Patient Position: Sitting)   Pulse 60   Ht 5' (1.524 m)   BMI 17.17 kg/m²     Physical Exam  Administrative Statements   I have spent a total time of 20 minutes in caring for this patient on the day of the visit/encounter including Diagnostic results, Instructions for management, Patient and family education, Impressions, Documenting in the medical record, Reviewing / ordering tests, medicine, procedures  , and Obtaining or reviewing history  .

## 2024-12-05 ENCOUNTER — OFFICE VISIT (OUTPATIENT)
Dept: OBGYN CLINIC | Facility: MEDICAL CENTER | Age: 84
End: 2024-12-05

## 2024-12-05 DIAGNOSIS — Z53.21 PROC/TRTMT NOT CRD OUT D/T PT LV BEF SEEN BY HLTH CARE PROV: Primary | ICD-10-CM

## 2024-12-05 PROCEDURE — PBNCHG PB NO CHARGE PLACEHOLDER: Performed by: CLINICAL NURSE SPECIALIST

## 2024-12-05 NOTE — PROGRESS NOTES
Name: Jo Jason      : 1940      MRN: [id number]  Encounter Provider: LUKE Cancino  Encounter Date: 2024   Encounter department: [facility] [place]    :  Assessment & Plan  Atrophic vaginitis         Vulvar itching                    Subjective:   History of Present Illness {?Quick Links Encounters * My Last Note * Last Note in Specialty * Snapshot * Since Last Visit * History :[id number]}    Jo Jason is a 83 y.o. female.She is here for No chief complaint on file.    Pt last seen in    Vulvar itching and vaginal atrophy  Given Temovate and estrace for management    Menstrual History:  No LMP recorded. Patient is postmenopausal.          Review of Systems  The following portions of the patient's history were reviewed and updated as appropriate: allergies, current medications, past family history, past medical history, past social history, past surgical history, and problem list.          Objective:   Objective {?Quick Links Trend Vitals * Enter New Vitals * Results Review * Timeline (Adult) * Labs * Imaging * Cardiology * Procedures * Lung Cancer Screening * Surgical eConsent :[id number]}  There were no vitals taken for this visit.    OBGyn Exam

## 2025-03-10 ENCOUNTER — APPOINTMENT (EMERGENCY)
Dept: RADIOLOGY | Facility: HOSPITAL | Age: 85
DRG: 386 | End: 2025-03-10
Payer: COMMERCIAL

## 2025-03-10 ENCOUNTER — HOSPITAL ENCOUNTER (INPATIENT)
Facility: HOSPITAL | Age: 85
LOS: 3 days | Discharge: NURSING, REHAB OR LONG-TERM CARE | DRG: 386 | End: 2025-03-14
Attending: EMERGENCY MEDICINE | Admitting: STUDENT IN AN ORGANIZED HEALTH CARE EDUCATION/TRAINING PROGRAM
Payer: COMMERCIAL

## 2025-03-10 DIAGNOSIS — D64.9 ANEMIA: ICD-10-CM

## 2025-03-10 DIAGNOSIS — K92.1 HEMATOCHEZIA: ICD-10-CM

## 2025-03-10 DIAGNOSIS — K52.9 PROCTOCOLITIS: Primary | ICD-10-CM

## 2025-03-10 DIAGNOSIS — R73.9 ELEVATED RANDOM BLOOD GLUCOSE LEVEL: ICD-10-CM

## 2025-03-10 DIAGNOSIS — D72.825 BANDEMIA: ICD-10-CM

## 2025-03-10 DIAGNOSIS — I10 ESSENTIAL HYPERTENSION: ICD-10-CM

## 2025-03-10 LAB
ACANTHOCYTES BLD QL SMEAR: PRESENT
ALBUMIN SERPL BCG-MCNC: 4.2 G/DL (ref 3.5–5)
ALP SERPL-CCNC: 85 U/L (ref 34–104)
ALT SERPL W P-5'-P-CCNC: 17 U/L (ref 7–52)
ANION GAP SERPL CALCULATED.3IONS-SCNC: 11 MMOL/L (ref 4–13)
APTT PPP: 32 SECONDS (ref 23–34)
AST SERPL W P-5'-P-CCNC: 24 U/L (ref 13–39)
BACTERIA UR QL AUTO: NORMAL /HPF
BASOPHILS # BLD MANUAL: 0 THOUSAND/UL (ref 0–0.1)
BASOPHILS NFR MAR MANUAL: 0 % (ref 0–1)
BILIRUB SERPL-MCNC: 0.67 MG/DL (ref 0.2–1)
BILIRUB UR QL STRIP: NEGATIVE
BUN SERPL-MCNC: 39 MG/DL (ref 5–25)
CALCIUM SERPL-MCNC: 10.3 MG/DL (ref 8.4–10.2)
CHLORIDE SERPL-SCNC: 99 MMOL/L (ref 96–108)
CLARITY UR: CLEAR
CO2 SERPL-SCNC: 24 MMOL/L (ref 21–32)
COLOR UR: ABNORMAL
CREAT SERPL-MCNC: 1.61 MG/DL (ref 0.6–1.3)
EOSINOPHIL # BLD MANUAL: 0 THOUSAND/UL (ref 0–0.4)
EOSINOPHIL NFR BLD MANUAL: 0 % (ref 0–6)
ERYTHROCYTE [DISTWIDTH] IN BLOOD BY AUTOMATED COUNT: 13.2 % (ref 11.6–15.1)
GFR SERPL CREATININE-BSD FRML MDRD: 29 ML/MIN/1.73SQ M
GLUCOSE SERPL-MCNC: 320 MG/DL (ref 65–140)
GLUCOSE UR STRIP-MCNC: ABNORMAL MG/DL
HCT VFR BLD AUTO: 37.9 % (ref 34.8–46.1)
HGB BLD-MCNC: 12.5 G/DL (ref 11.5–15.4)
HGB UR QL STRIP.AUTO: NEGATIVE
INR PPP: 2.81 (ref 0.85–1.19)
KETONES UR STRIP-MCNC: NEGATIVE MG/DL
LEUKOCYTE ESTERASE UR QL STRIP: NEGATIVE
LYMPHOCYTES # BLD AUTO: 0.11 THOUSAND/UL (ref 0.6–4.47)
LYMPHOCYTES # BLD AUTO: 1 % (ref 14–44)
MCH RBC QN AUTO: 31.3 PG (ref 26.8–34.3)
MCHC RBC AUTO-ENTMCNC: 33 G/DL (ref 31.4–37.4)
MCV RBC AUTO: 95 FL (ref 82–98)
MONOCYTES # BLD AUTO: 0.23 THOUSAND/UL (ref 0–1.22)
MONOCYTES NFR BLD: 2 % (ref 4–12)
NEUTROPHILS # BLD MANUAL: 11.11 THOUSAND/UL (ref 1.85–7.62)
NEUTS BAND NFR BLD MANUAL: 10 % (ref 0–8)
NEUTS SEG NFR BLD AUTO: 87 % (ref 43–75)
NITRITE UR QL STRIP: NEGATIVE
NON-SQ EPI CELLS URNS QL MICRO: NORMAL /HPF
OVALOCYTES BLD QL SMEAR: PRESENT
PH UR STRIP.AUTO: 5.5 [PH]
PLATELET # BLD AUTO: 255 THOUSANDS/UL (ref 149–390)
PLATELET BLD QL SMEAR: ADEQUATE
PMV BLD AUTO: 10 FL (ref 8.9–12.7)
POIKILOCYTOSIS BLD QL SMEAR: PRESENT
POTASSIUM SERPL-SCNC: 4.2 MMOL/L (ref 3.5–5.3)
PROT SERPL-MCNC: 7.3 G/DL (ref 6.4–8.4)
PROT UR STRIP-MCNC: ABNORMAL MG/DL
PROTHROMBIN TIME: 30.2 SECONDS (ref 12.3–15)
RBC # BLD AUTO: 4 MILLION/UL (ref 3.81–5.12)
RBC #/AREA URNS AUTO: NORMAL /HPF
RBC MORPH BLD: PRESENT
SODIUM SERPL-SCNC: 134 MMOL/L (ref 135–147)
SP GR UR STRIP.AUTO: 1.02 (ref 1–1.03)
UROBILINOGEN UR STRIP-ACNC: 2 MG/DL
WBC # BLD AUTO: 11.45 THOUSAND/UL (ref 4.31–10.16)
WBC #/AREA URNS AUTO: NORMAL /HPF

## 2025-03-10 PROCEDURE — 96375 TX/PRO/DX INJ NEW DRUG ADDON: CPT

## 2025-03-10 PROCEDURE — 71045 X-RAY EXAM CHEST 1 VIEW: CPT

## 2025-03-10 PROCEDURE — 86850 RBC ANTIBODY SCREEN: CPT

## 2025-03-10 PROCEDURE — 81001 URINALYSIS AUTO W/SCOPE: CPT

## 2025-03-10 PROCEDURE — 85027 COMPLETE CBC AUTOMATED: CPT

## 2025-03-10 PROCEDURE — 82010 KETONE BODYS QUAN: CPT

## 2025-03-10 PROCEDURE — 86900 BLOOD TYPING SEROLOGIC ABO: CPT

## 2025-03-10 PROCEDURE — 83690 ASSAY OF LIPASE: CPT

## 2025-03-10 PROCEDURE — 85007 BL SMEAR W/DIFF WBC COUNT: CPT

## 2025-03-10 PROCEDURE — 99285 EMERGENCY DEPT VISIT HI MDM: CPT | Performed by: EMERGENCY MEDICINE

## 2025-03-10 PROCEDURE — 36415 COLL VENOUS BLD VENIPUNCTURE: CPT

## 2025-03-10 PROCEDURE — 85610 PROTHROMBIN TIME: CPT

## 2025-03-10 PROCEDURE — 99285 EMERGENCY DEPT VISIT HI MDM: CPT

## 2025-03-10 PROCEDURE — 87086 URINE CULTURE/COLONY COUNT: CPT | Performed by: EMERGENCY MEDICINE

## 2025-03-10 PROCEDURE — 86901 BLOOD TYPING SEROLOGIC RH(D): CPT

## 2025-03-10 PROCEDURE — 84145 PROCALCITONIN (PCT): CPT

## 2025-03-10 PROCEDURE — 93005 ELECTROCARDIOGRAM TRACING: CPT

## 2025-03-10 PROCEDURE — 80053 COMPREHEN METABOLIC PANEL: CPT

## 2025-03-10 PROCEDURE — 85730 THROMBOPLASTIN TIME PARTIAL: CPT

## 2025-03-10 RX ORDER — VANCOMYCIN HYDROCHLORIDE 1 G/200ML
25 INJECTION, SOLUTION INTRAVENOUS ONCE
Status: COMPLETED | OUTPATIENT
Start: 2025-03-11 | End: 2025-03-11

## 2025-03-10 RX ORDER — ONDANSETRON 2 MG/ML
4 INJECTION INTRAMUSCULAR; INTRAVENOUS ONCE AS NEEDED
Status: COMPLETED | OUTPATIENT
Start: 2025-03-10 | End: 2025-03-11

## 2025-03-10 RX ORDER — PANTOPRAZOLE SODIUM 40 MG/10ML
40 INJECTION, POWDER, LYOPHILIZED, FOR SOLUTION INTRAVENOUS ONCE
Status: COMPLETED | OUTPATIENT
Start: 2025-03-10 | End: 2025-03-10

## 2025-03-10 RX ADMIN — PANTOPRAZOLE SODIUM 40 MG: 40 INJECTION, POWDER, FOR SOLUTION INTRAVENOUS at 23:05

## 2025-03-11 ENCOUNTER — APPOINTMENT (EMERGENCY)
Dept: CT IMAGING | Facility: HOSPITAL | Age: 85
DRG: 386 | End: 2025-03-11
Payer: COMMERCIAL

## 2025-03-11 PROBLEM — K92.1 HEMATOCHEZIA: Status: ACTIVE | Noted: 2025-03-11

## 2025-03-11 PROBLEM — K52.9 PROCTOCOLITIS: Status: ACTIVE | Noted: 2025-03-11

## 2025-03-11 PROBLEM — K86.9 PANCREATIC LESION: Status: ACTIVE | Noted: 2025-03-11

## 2025-03-11 PROBLEM — E87.20 LACTIC ACIDOSIS: Status: ACTIVE | Noted: 2025-03-11

## 2025-03-11 PROBLEM — R73.9 HYPERGLYCEMIA: Status: ACTIVE | Noted: 2025-03-11

## 2025-03-11 LAB
ABO GROUP BLD: NORMAL
ABO GROUP BLD: NORMAL
ANION GAP SERPL CALCULATED.3IONS-SCNC: 10 MMOL/L (ref 4–13)
ANISOCYTOSIS BLD QL SMEAR: PRESENT
ATRIAL RATE: 65 BPM
B-OH-BUTYR SERPL-MCNC: 0.11 MMOL/L (ref 0.02–0.27)
BASE EX.OXY STD BLDV CALC-SCNC: 94.2 % (ref 60–80)
BASE EXCESS BLDV CALC-SCNC: -4.1 MMOL/L
BASOPHILS # BLD MANUAL: 0 THOUSAND/UL (ref 0–0.1)
BASOPHILS NFR MAR MANUAL: 0 % (ref 0–1)
BLD GP AB SCN SERPL QL: NEGATIVE
BUN SERPL-MCNC: 31 MG/DL (ref 5–25)
BURR CELLS BLD QL SMEAR: PRESENT
CALCIUM SERPL-MCNC: 8.6 MG/DL (ref 8.4–10.2)
CHLORIDE SERPL-SCNC: 104 MMOL/L (ref 96–108)
CO2 SERPL-SCNC: 21 MMOL/L (ref 21–32)
CREAT SERPL-MCNC: 1.2 MG/DL (ref 0.6–1.3)
EOSINOPHIL # BLD MANUAL: 0 THOUSAND/UL (ref 0–0.4)
EOSINOPHIL NFR BLD MANUAL: 0 % (ref 0–6)
ERYTHROCYTE [DISTWIDTH] IN BLOOD BY AUTOMATED COUNT: 13.1 % (ref 11.6–15.1)
EST. AVERAGE GLUCOSE BLD GHB EST-MCNC: 146 MG/DL
FLUAV AG UPPER RESP QL IA.RAPID: NEGATIVE
FLUBV AG UPPER RESP QL IA.RAPID: NEGATIVE
GFR SERPL CREATININE-BSD FRML MDRD: 41 ML/MIN/1.73SQ M
GLUCOSE SERPL-MCNC: 244 MG/DL (ref 65–140)
HBA1C MFR BLD: 6.7 %
HCO3 BLDV-SCNC: 20.2 MMOL/L (ref 24–30)
HCT VFR BLD AUTO: 27.5 % (ref 34.8–46.1)
HCT VFR BLD AUTO: 27.7 % (ref 34.8–46.1)
HCT VFR BLD AUTO: 31.7 % (ref 34.8–46.1)
HCT VFR BLD AUTO: 34.5 % (ref 34.8–46.1)
HGB BLD-MCNC: 10.3 G/DL (ref 11.5–15.4)
HGB BLD-MCNC: 11.2 G/DL (ref 11.5–15.4)
HGB BLD-MCNC: 8.9 G/DL (ref 11.5–15.4)
HGB BLD-MCNC: 9.1 G/DL (ref 11.5–15.4)
INR PPP: 1.5 (ref 0.85–1.19)
LACTATE SERPL-SCNC: 1.9 MMOL/L (ref 0.5–2)
LACTATE SERPL-SCNC: 2.1 MMOL/L (ref 0.5–2)
LACTATE SERPL-SCNC: 2.3 MMOL/L (ref 0.5–2)
LACTATE SERPL-SCNC: 2.5 MMOL/L (ref 0.5–2)
LIPASE SERPL-CCNC: 150 U/L (ref 11–82)
LYMPHOCYTES # BLD AUTO: 0.19 THOUSAND/UL (ref 0.6–4.47)
LYMPHOCYTES # BLD AUTO: 2 % (ref 14–44)
MCH RBC QN AUTO: 30.8 PG (ref 26.8–34.3)
MCHC RBC AUTO-ENTMCNC: 32.5 G/DL (ref 31.4–37.4)
MCV RBC AUTO: 95 FL (ref 82–98)
MONOCYTES # BLD AUTO: 0.38 THOUSAND/UL (ref 0–1.22)
MONOCYTES NFR BLD: 4 % (ref 4–12)
NEUTROPHILS # BLD MANUAL: 8.95 THOUSAND/UL (ref 1.85–7.62)
NEUTS BAND NFR BLD MANUAL: 8 % (ref 0–8)
NEUTS SEG NFR BLD AUTO: 86 % (ref 43–75)
O2 CT BLDV-SCNC: 16.2 ML/DL
OVALOCYTES BLD QL SMEAR: PRESENT
P AXIS: 89 DEGREES
PCO2 BLDV: 34.3 MM HG (ref 42–50)
PH BLDV: 7.39 [PH] (ref 7.3–7.4)
PLATELET # BLD AUTO: 148 THOUSANDS/UL (ref 149–390)
PLATELET BLD QL SMEAR: ADEQUATE
PMV BLD AUTO: 10.4 FL (ref 8.9–12.7)
PO2 BLDV: 116.2 MM HG (ref 35–45)
POIKILOCYTOSIS BLD QL SMEAR: PRESENT
POTASSIUM SERPL-SCNC: 4.1 MMOL/L (ref 3.5–5.3)
PR INTERVAL: 214 MS
PROCALCITONIN SERPL-MCNC: 0.46 NG/ML
PROTHROMBIN TIME: 18.8 SECONDS (ref 12.3–15)
QRS AXIS: 77 DEGREES
QRSD INTERVAL: 82 MS
QT INTERVAL: 438 MS
QTC INTERVAL: 455 MS
RBC # BLD AUTO: 3.34 MILLION/UL (ref 3.81–5.12)
RBC MORPH BLD: PRESENT
RH BLD: POSITIVE
RH BLD: POSITIVE
SARS-COV+SARS-COV-2 AG RESP QL IA.RAPID: NEGATIVE
SODIUM SERPL-SCNC: 135 MMOL/L (ref 135–147)
SPECIMEN EXPIRATION DATE: NORMAL
T WAVE AXIS: 105 DEGREES
VENTRICULAR RATE: 65 BPM
WBC # BLD AUTO: 9.52 THOUSAND/UL (ref 4.31–10.16)

## 2025-03-11 PROCEDURE — 82805 BLOOD GASES W/O2 SATURATION: CPT

## 2025-03-11 PROCEDURE — 85007 BL SMEAR W/DIFF WBC COUNT: CPT

## 2025-03-11 PROCEDURE — 85018 HEMOGLOBIN: CPT

## 2025-03-11 PROCEDURE — 85014 HEMATOCRIT: CPT | Performed by: SURGERY

## 2025-03-11 PROCEDURE — 99223 1ST HOSP IP/OBS HIGH 75: CPT | Performed by: INTERNAL MEDICINE

## 2025-03-11 PROCEDURE — 96375 TX/PRO/DX INJ NEW DRUG ADDON: CPT

## 2025-03-11 PROCEDURE — 87154 CUL TYP ID BLD PTHGN 6+ TRGT: CPT

## 2025-03-11 PROCEDURE — 87040 BLOOD CULTURE FOR BACTERIA: CPT

## 2025-03-11 PROCEDURE — 85014 HEMATOCRIT: CPT

## 2025-03-11 PROCEDURE — 83605 ASSAY OF LACTIC ACID: CPT

## 2025-03-11 PROCEDURE — 83036 HEMOGLOBIN GLYCOSYLATED A1C: CPT

## 2025-03-11 PROCEDURE — 93010 ELECTROCARDIOGRAM REPORT: CPT | Performed by: INTERNAL MEDICINE

## 2025-03-11 PROCEDURE — 85610 PROTHROMBIN TIME: CPT

## 2025-03-11 PROCEDURE — 71260 CT THORAX DX C+: CPT

## 2025-03-11 PROCEDURE — 85027 COMPLETE CBC AUTOMATED: CPT

## 2025-03-11 PROCEDURE — 87804 INFLUENZA ASSAY W/OPTIC: CPT

## 2025-03-11 PROCEDURE — 85018 HEMOGLOBIN: CPT | Performed by: SURGERY

## 2025-03-11 PROCEDURE — 96367 TX/PROPH/DG ADDL SEQ IV INF: CPT

## 2025-03-11 PROCEDURE — 87081 CULTURE SCREEN ONLY: CPT

## 2025-03-11 PROCEDURE — 96365 THER/PROPH/DIAG IV INF INIT: CPT

## 2025-03-11 PROCEDURE — 74178 CT ABD&PLV WO CNTR FLWD CNTR: CPT

## 2025-03-11 PROCEDURE — 87811 SARS-COV-2 COVID19 W/OPTIC: CPT

## 2025-03-11 PROCEDURE — 99223 1ST HOSP IP/OBS HIGH 75: CPT | Performed by: COLON & RECTAL SURGERY

## 2025-03-11 PROCEDURE — 92610 EVALUATE SWALLOWING FUNCTION: CPT

## 2025-03-11 PROCEDURE — 99222 1ST HOSP IP/OBS MODERATE 55: CPT | Performed by: INTERNAL MEDICINE

## 2025-03-11 PROCEDURE — 80048 BASIC METABOLIC PNL TOTAL CA: CPT

## 2025-03-11 PROCEDURE — 36415 COLL VENOUS BLD VENIPUNCTURE: CPT

## 2025-03-11 RX ORDER — PANTOPRAZOLE SODIUM 40 MG/1
40 TABLET, DELAYED RELEASE ORAL
Status: DISCONTINUED | OUTPATIENT
Start: 2025-03-11 | End: 2025-03-14 | Stop reason: HOSPADM

## 2025-03-11 RX ORDER — LOSARTAN POTASSIUM 50 MG/1
50 TABLET ORAL DAILY
Status: DISCONTINUED | OUTPATIENT
Start: 2025-03-11 | End: 2025-03-14 | Stop reason: HOSPADM

## 2025-03-11 RX ORDER — AMLODIPINE BESYLATE 5 MG/1
5 TABLET ORAL 2 TIMES DAILY
Status: DISCONTINUED | OUTPATIENT
Start: 2025-03-11 | End: 2025-03-14 | Stop reason: HOSPADM

## 2025-03-11 RX ORDER — ATORVASTATIN CALCIUM 40 MG/1
40 TABLET, FILM COATED ORAL DAILY
Status: DISCONTINUED | OUTPATIENT
Start: 2025-03-11 | End: 2025-03-14 | Stop reason: HOSPADM

## 2025-03-11 RX ORDER — METRONIDAZOLE 500 MG/100ML
500 INJECTION, SOLUTION INTRAVENOUS EVERY 8 HOURS
Status: DISCONTINUED | OUTPATIENT
Start: 2025-03-11 | End: 2025-03-14 | Stop reason: HOSPADM

## 2025-03-11 RX ORDER — MIRTAZAPINE 15 MG/1
15 TABLET, FILM COATED ORAL
Status: DISCONTINUED | OUTPATIENT
Start: 2025-03-11 | End: 2025-03-14 | Stop reason: HOSPADM

## 2025-03-11 RX ORDER — SODIUM CHLORIDE, SODIUM GLUCONATE, SODIUM ACETATE, POTASSIUM CHLORIDE, MAGNESIUM CHLORIDE, SODIUM PHOSPHATE, DIBASIC, AND POTASSIUM PHOSPHATE .53; .5; .37; .037; .03; .012; .00082 G/100ML; G/100ML; G/100ML; G/100ML; G/100ML; G/100ML; G/100ML
75 INJECTION, SOLUTION INTRAVENOUS CONTINUOUS
Status: DISCONTINUED | OUTPATIENT
Start: 2025-03-11 | End: 2025-03-14 | Stop reason: HOSPADM

## 2025-03-11 RX ORDER — PANTOPRAZOLE SODIUM 40 MG/10ML
40 INJECTION, POWDER, LYOPHILIZED, FOR SOLUTION INTRAVENOUS EVERY 12 HOURS
Status: DISCONTINUED | OUTPATIENT
Start: 2025-03-11 | End: 2025-03-11

## 2025-03-11 RX ORDER — SODIUM CHLORIDE 9 MG/ML
150 INJECTION, SOLUTION INTRAVENOUS CONTINUOUS
Status: DISCONTINUED | OUTPATIENT
Start: 2025-03-11 | End: 2025-03-11

## 2025-03-11 RX ORDER — METRONIDAZOLE 500 MG/100ML
500 INJECTION, SOLUTION INTRAVENOUS ONCE
Status: COMPLETED | OUTPATIENT
Start: 2025-03-11 | End: 2025-03-11

## 2025-03-11 RX ORDER — ASPIRIN 81 MG/1
81 TABLET ORAL DAILY
Status: DISCONTINUED | OUTPATIENT
Start: 2025-03-11 | End: 2025-03-14 | Stop reason: HOSPADM

## 2025-03-11 RX ORDER — SODIUM CHLORIDE, SODIUM GLUCONATE, SODIUM ACETATE, POTASSIUM CHLORIDE, MAGNESIUM CHLORIDE, SODIUM PHOSPHATE, DIBASIC, AND POTASSIUM PHOSPHATE .53; .5; .37; .037; .03; .012; .00082 G/100ML; G/100ML; G/100ML; G/100ML; G/100ML; G/100ML; G/100ML
500 INJECTION, SOLUTION INTRAVENOUS ONCE
Status: COMPLETED | OUTPATIENT
Start: 2025-03-11 | End: 2025-03-11

## 2025-03-11 RX ORDER — EZETIMIBE 10 MG/1
10 TABLET ORAL DAILY
Status: DISCONTINUED | OUTPATIENT
Start: 2025-03-11 | End: 2025-03-14 | Stop reason: HOSPADM

## 2025-03-11 RX ADMIN — ONDANSETRON 4 MG: 2 INJECTION INTRAMUSCULAR; INTRAVENOUS at 01:51

## 2025-03-11 RX ADMIN — METRONIDAZOLE 500 MG: 500 INJECTION, SOLUTION INTRAVENOUS at 19:13

## 2025-03-11 RX ADMIN — IOHEXOL 85 ML: 350 INJECTION, SOLUTION INTRAVENOUS at 02:10

## 2025-03-11 RX ADMIN — VANCOMYCIN HYDROCHLORIDE 1000 MG: 1 INJECTION, SOLUTION INTRAVENOUS at 01:55

## 2025-03-11 RX ADMIN — METRONIDAZOLE 500 MG: 500 INJECTION, SOLUTION INTRAVENOUS at 03:24

## 2025-03-11 RX ADMIN — SODIUM CHLORIDE 1000 ML: 0.9 INJECTION, SOLUTION INTRAVENOUS at 00:39

## 2025-03-11 RX ADMIN — Medication 1500 UNITS: at 01:46

## 2025-03-11 RX ADMIN — AMLODIPINE BESYLATE 5 MG: 5 TABLET ORAL at 22:29

## 2025-03-11 RX ADMIN — METRONIDAZOLE 500 MG: 500 INJECTION, SOLUTION INTRAVENOUS at 10:55

## 2025-03-11 RX ADMIN — SODIUM CHLORIDE, SODIUM GLUCONATE, SODIUM ACETATE, POTASSIUM CHLORIDE, MAGNESIUM CHLORIDE, SODIUM PHOSPHATE, DIBASIC, AND POTASSIUM PHOSPHATE 75 ML/HR: .53; .5; .37; .037; .03; .012; .00082 INJECTION, SOLUTION INTRAVENOUS at 06:01

## 2025-03-11 RX ADMIN — SODIUM CHLORIDE, SODIUM GLUCONATE, SODIUM ACETATE, POTASSIUM CHLORIDE, MAGNESIUM CHLORIDE, SODIUM PHOSPHATE, DIBASIC, AND POTASSIUM PHOSPHATE 500 ML: .53; .5; .37; .037; .03; .012; .00082 INJECTION, SOLUTION INTRAVENOUS at 04:28

## 2025-03-11 RX ADMIN — MIRTAZAPINE 15 MG: 15 TABLET, FILM COATED ORAL at 22:29

## 2025-03-11 RX ADMIN — PHYTONADIONE 10 MG: 10 INJECTION, EMULSION INTRAMUSCULAR; INTRAVENOUS; SUBCUTANEOUS at 01:01

## 2025-03-11 RX ADMIN — SODIUM CHLORIDE, SODIUM GLUCONATE, SODIUM ACETATE, POTASSIUM CHLORIDE, MAGNESIUM CHLORIDE, SODIUM PHOSPHATE, DIBASIC, AND POTASSIUM PHOSPHATE 75 ML/HR: .53; .5; .37; .037; .03; .012; .00082 INJECTION, SOLUTION INTRAVENOUS at 19:09

## 2025-03-11 RX ADMIN — CEFEPIME 1000 MG: 1 INJECTION, POWDER, FOR SOLUTION INTRAMUSCULAR; INTRAVENOUS at 00:55

## 2025-03-11 RX ADMIN — SODIUM CHLORIDE 500 ML: 0.9 INJECTION, SOLUTION INTRAVENOUS at 04:25

## 2025-03-11 RX ADMIN — CEFTRIAXONE SODIUM 1000 MG: 10 INJECTION, POWDER, FOR SOLUTION INTRAVENOUS at 11:41

## 2025-03-11 NOTE — ASSESSMENT & PLAN NOTE
- CT 3/11/25 Subcentimeter pancreatic cystic lesion.-Management recommendation: Followup every 2 years. Endpoint determined by clinician. Consider patient's age and clinical status to assess need for further follow-up. Preferred imaging modality: abdomen MRI and MRCP   with and without IV contrast, or triple phase abdomen CT with IV contrast, or abdomen MRI and MRCP without IV contrast.

## 2025-03-11 NOTE — ASSESSMENT & PLAN NOTE
-Top on the differential would include ischemic colitis versus infectious versus diverticular bleed.  Less likely inflammatory bowel disease or neoplasm  - follow hemoglobin and transfuse as needed  -Hemoglobin stable at 11.2

## 2025-03-11 NOTE — SPEECH THERAPY NOTE
Speech-Language Pathology Bedside Swallow Evaluation        Patient Name: Jo Jason    Today's Date: 3/11/2025     Problem List  Principal Problem:    Proctocolitis  Active Problems:    Coronary artery disease involving native coronary artery of native heart with angina pectoris (HCC)    Benign hypertension with CKD (chronic kidney disease) stage IV (HCC)    Dyslipidemia    Hematochezia    Pancreatic lesion    Hyperglycemia    Lactic acidosis         Summary    Pt presents with Mild-moderate oral dysphagia characterized by prolonged mastication/manipulation w/ effortful swallows, but no overt s/s aspiration.      Recommendations:   Diet: mechanically altered/level 2 diet and thin liquids   Meds: crushed with puree   Frequent Oral care: 2x/day  Aspiration precautions   Other Recommendations/ considerations: will cont to follow for diet tolerance and ongoing assessment.        Current Medical Status  Pt is a 84 y.o. female who presented to [facility]  with bloody stool. Pt presents with hematochezia found to have proctocolitis.  History is limited by patient's dementia.  Spoke with nursing facility and spoke to  Daniel BLANCO.  She reports that patient left at 2210 prior to her shift starting.  Based off the records that were available to her she tells me that the patient had 1 large episode of bloody diarrhea with subsequent nonbloody bilious vomiting.  Patient is typically only alert to self.  She was denying any pain at that time.     Past medical history:   Please see H&P for details    Special Studies:  CT-chest w/ abd/pelvis 3/11/25 LUNGS: No focal consolidation or suspicious pulmonary nodules.     Social/Education/Vocational Hx:  Pt lives in SNF/F-[facility]    Swallow Information   Prior speech/swallowing tx: none   Current Risks for Dysphagia & Aspiration: AMS and hx of dementia   Current Symptoms/Concerns:  hx of dementia  Current Diet: NPO   Baseline Diet:  unknown, no transfer records available.    Takes pills- unknown. Pt stated she takes pills w/ water     Baseline Assessment   Behavior/Cognition: alert  Speech/Language Status: able to participate in basic conversation and able to follow commands  Patient Positioning: upright in bed     Swallow Mechanism Exam   Facial: symmetrical  Labial: WFL  Lingual: WFL  Velum: unable to visualize  Mandible: adequate ROM  Dentition: full dentures  Vocal quality:clear/adequate   Volitional Cough: weak   Respiratory: RA    Consistencies Assessed and Performance   Consistencies Administered: thin liquids, nectar thick, puree, and mechanical soft solids    Oral Stage: pt w/ slow, prolonged mastication mech soft/solids. Pt took sips of NTL and thin liquids by cup and straw, better retrieval from straw and good oral control w/ liquids. Bolus transfer appeared slow.     Pharyngeal Stage: swallow initiation appeared delayed and effortful. No coughing, throat clearing or wet voice noted with liquids.       Esophageal Concerns: none reported      Results Reviewed with: patient, RN, and MD   Dysphagia Goals: pt will tolerate dysphagia 2 diet  with thin liquids without s/s of aspiration x1-3 sessions       Mirela Guadarrama MA CCC-SLP  Speech Pathologist  [place] license # [id number]  [place] license # [id number]  Available via Secure Chat

## 2025-03-11 NOTE — ASSESSMENT & PLAN NOTE
CT showing subcentimeter pancreatic cystic lesion  Recommend follow-up every 2 years.  Consider patient's age and clinical status to assess need for further follow-up.  Preferred imaging modality is abdomen MRI and MRCP with and without IV contrast ordered triple phase abdomen CT with IV contrast or abdomen MRI and MRCP without IV contrast.  Discussed finding with patients sister Mariposa who feels patient would not want this further worked up.

## 2025-03-11 NOTE — ASSESSMENT & PLAN NOTE
85yo F with hx of dementia brought in by nursing facility with hematochezia.  Ct scan significant for bowel thickening from descending colon to rectum c/w proctocolitis.  LA 2.1 from 2.5 from 2.3  Hgb 11.2 from 12.5  WBC 11.4    Plan  NPO, IV fluids  F/u GI consult  Recommend stool studies, calprotectin   Trend lactate, Hgb  Rest of care per primary team

## 2025-03-11 NOTE — ASSESSMENT & PLAN NOTE
Lab Results   Component Value Date    EGFR 29 03/10/2025    EGFR 26 07/15/2024    EGFR 24 04/08/2024    CREATININE 1.61 (H) 03/10/2025    CREATININE 1.76 (H) 07/15/2024    CREATININE 1.89 (H) 04/08/2024

## 2025-03-11 NOTE — H&P
H&P - Hospitalist   Name: Jo Jason 84 y.o. female I MRN: [id number]  Unit/Bed#: ED-30 I Date of Admission: 3/10/2025   Date of Service: 3/11/2025 I Hospital Day: 0     Assessment & Plan  Proctocolitis  Patient presents from nursing home with vomiting and multiple episodes of bloody diarrhea.  Not on anticoagulation.+ ASA.  CT abdomen: Marked wall thickening and inflammation of the descending colon, sigmoid colon, and rectum consistent with proctocolitis.  Correlate for ischemia.  No evidence of high-volume GI bleed.  In ED patient was started on IV ceftriaxone and Flagyl  ED discussed with general surgery low concern for bowel ischemia; patient without any abdominal pain  Plan  Continue IV Rocephin/Flagyl ->  currently not meeting SIRS   Obtain stool enteric panel and fecal calprotectin  Surgery consult  GI consult  Continue IV fluids  Follow-up on blood cultures  Hematochezia  Noted to have episodes of bloody diarrhea nursing facility  Hemoglobin 12.5-> 11.2  No further bleeding in the ED  Noted to have elevated INR 2.81 not on anticoagulation  Given reversal in the ED  Suspect in the setting of proctocolitis versus ischemic colitis.  Surgery/GI following  Trend hemoglobin  Transfuse for hemoglobin less than 7  Coronary artery disease involving native coronary artery of native heart with angina pectoris (HCC)  Hold ASA in the setting of GI bleeding  Resume when able  Continue PTA Lipitor  Benign hypertension with CKD (chronic kidney disease) stage IV (HCC)  Lab Results   Component Value Date    EGFR 29 03/10/2025    EGFR 26 07/15/2024    EGFR 24 04/08/2024    CREATININE 1.61 (H) 03/10/2025    CREATININE 1.76 (H) 07/15/2024    CREATININE 1.89 (H) 04/08/2024   Creatinine stable  Continue PTA antihypertensive regimen  Dyslipidemia  Continue PTA Lipitor and Zetia  Lifestyle modification encouraged  Pancreatic lesion  CT showing subcentimeter pancreatic cystic lesion  Recommend follow-up every 2 years.  Consider  patient's age and clinical status to assess need for further follow-up.  Preferred imaging modality is abdomen MRI and MRCP with and without IV contrast ordered triple phase abdomen CT with IV contrast or abdomen MRI and MRCP without IV contrast.  Discussed finding with patients sister Mariposa who feels patient would not want this further worked up.  Hyperglycemia  Blood sugar noted to be 320 on BMP  No prior history of diabetes last A1c in 2022  Obtain hemoglobin A1c  Lactic acidosis  Lactic 2.3-> 2.5  Continue to trend every 2 hours  Suspect in the setting of infection   Will give 500 mL fluid bolus now  And start on maintenance fluids      VTE Pharmacologic Prophylaxis:   Moderate Risk (Score 3-4) - Pharmacological DVT Prophylaxis Contraindicated. Sequential Compression Devices Ordered.  Code Status: Level 3 - DNAR and DNI   Discussion with family:  Updated patient's sister Mariposa Roy.  Also discussed with FRANCIS Agustin.     Anticipated Length of Stay: Patient will be admitted on an inpatient basis with an anticipated length of stay of greater than 2 midnights secondary to proctocolitis, lactic acidosis, hematochezia.    History of Present Illness   Chief Complaint: Hematochezia    Jo Jason is a 84 y.o. female with a PMH of CAD, CKD stage V, dementia, hypertension, tremor who presents with hematochezia found to have proctocolitis.  History is limited by patient's dementia.  Spoke with nursing facility and spoke to  Daniel BLANCO.  She reports that patient left at 2210 prior to her shift starting.  Based off the records that were available to her she tells me that the patient had 1 large episode of bloody diarrhea with subsequent nonbloody bilious vomiting.  Patient is typically only alert to self.  She was denying any pain at that time.  Patient just finished course of Augmentin for folliculitis.      Review of Systems   Unable to perform ROS: Dementia       Historical Information   Past Medical  History:   Diagnosis Date    Dyslipidemia     Hypertension     Tremor      Past Surgical History:   Procedure Laterality Date    CARDIAC SURGERY      CAROTID ARTERY ANGIOPLASTY      CATARACT EXTRACTION Bilateral     CLOSED MANIPULATION SHOULDER       Social History     Tobacco Use    Smoking status: Never     Passive exposure: Past    Smokeless tobacco: Never   Vaping Use    Vaping status: Never Used   Substance and Sexual Activity    Alcohol use: Never    Drug use: Never    Sexual activity: Not on file     E-Cigarette/Vaping    E-Cigarette Use Never User      E-Cigarette/Vaping Substances     Family History   Problem Relation Age of Onset    Coronary artery disease Mother      Social History:  Marital Status: Single   Occupation: retired  Patient Pre-hospital Living Situation: Long Term Care Facility  Patient Pre-hospital Level of Mobility: walks with walker  Patient Pre-hospital Diet Restrictions: Regular diet thin liquids    Meds/Allergies   I have reveiwed home medications using records provided by [facility]. Spoke to Daniel BLANCO at [facility]  Prior to Admission medications    Medication Sig Start Date End Date Taking? Authorizing Provider   acetaminophen (TYLENOL) 325 mg tablet Take 650 mg by mouth every 4 (four) hours as needed for mild pain    Historical Provider, MD   amLODIPine (NORVASC) 5 mg tablet TAKE 1 TABLET BY MOUTH TWICE DAILY  Patient taking differently: Take 10 mg by mouth 2 (two) times a day 10/23/24   Steffanie Bennett PA-C   aspirin (ECOTRIN LOW STRENGTH) 81 mg EC tablet Take 1 tablet (81 mg total) by mouth daily 11/8/22   Mario Simon MD   atorvastatin (LIPITOR) 40 mg tablet Take 1 tablet (40 mg total) by mouth daily 7/29/24   Steffanie Bennett PA-C   bisacodyl (FLEET) 10 MG/30ML ENEM Insert 10 mg into the rectum if needed for constipation On day 4 of no BM    Historical Provider, MD   clobetasol (TEMOVATE) 0.05 % ointment Apply topically 2 (two) times a week 6/8/23   LUKE Trejo    estradiol (ESTRACE VAGINAL) 0.1 mg/g vaginal cream Apply a pea sized amount to vulva twice weekly 6/7/23   LUKE Trejo   ezetimibe (ZETIA) 10 mg tablet Take 1 tablet (10 mg total) by mouth daily 7/29/24   Steffanie Bennett PA-C   losartan (COZAAR) 50 mg tablet Take 1 tablet (50 mg total) by mouth daily 7/29/24   Steffanie Bennett PA-C   magnesium hydroxide (MILK OF MAGNESIA) 400 mg/5 mL oral suspension Take 30 mL by mouth daily as needed for constipation Give day 2 of no BM, repeat on day 3 of no BM    Historical Provider, MD   mirtazapine (REMERON) 15 mg tablet Take 1 tablet (15 mg total) by mouth daily at bedtime 7/29/24   Steffanie Bennett PA-C   nitroglycerin (NITROSTAT) 0.4 mg SL tablet Place 1 tablet (0.4 mg total) under the tongue every 5 (five) minutes as needed for chest pain 2/15/22   Delisa Anthony DO   polyethylene glycol (MIRALAX) 17 g packet Take 17 g by mouth if needed (constipation) Give day 2 of no BM, repeat day 3 of no BM    Historical Provider, MD   senna (SENOKOT) 8.6 MG tablet Take 1 tablet by mouth if needed for constipation (Give day 2 of no BM, repeat day 3 of no BM)    Historical Provider, MD     No Known Allergies    Objective :  Temp:  [97.4 °F (36.3 °C)] 97.4 °F (36.3 °C)  HR:  [60-87] 69  BP: (126-167)/(60-78) 150/70  Resp:  [16-18] 16  SpO2:  [96 %-100 %] 97 %  O2 Device: None (Room air)    Physical Exam  Constitutional:       Appearance: She is ill-appearing.   HENT:      Mouth/Throat:      Mouth: Mucous membranes are dry.   Cardiovascular:      Rate and Rhythm: Normal rate and regular rhythm.      Heart sounds: Normal heart sounds.   Abdominal:      General: Abdomen is flat. There is no distension.      Palpations: Abdomen is soft. There is no mass.      Tenderness: There is no abdominal tenderness. There is no guarding or rebound.   Musculoskeletal:      Right lower leg: No edema.      Left lower leg: No edema.   Skin:     Coloration: Skin is pale.   Neurological:       Mental Status: She is disoriented.      GCS: GCS eye subscore is 4. GCS verbal subscore is 4. GCS motor subscore is 6.      Motor: Tremor (chronic) present.      Comments: Patient is alert to self and disoriented to time, place, and situation.  Per discussion with LPN at [facility] and sister this is her baseline   Psychiatric:         Mood and Affect: Mood normal.         Behavior: Behavior normal.          Lines/Drains:            Lab Results: I have reviewed the following results:  Results from last 7 days   Lab Units 03/11/25  0332 03/10/25  2301   WBC Thousand/uL  --  11.45*   HEMOGLOBIN g/dL 11.2* 12.5   HEMATOCRIT % 34.5* 37.9   PLATELETS Thousands/uL  --  255   BANDS PCT %  --  10*   LYMPHO PCT %  --  1*   MONO PCT %  --  2*   EOS PCT %  --  0     Results from last 7 days   Lab Units 03/10/25  2301   SODIUM mmol/L 134*   POTASSIUM mmol/L 4.2   CHLORIDE mmol/L 99   CO2 mmol/L 24   BUN mg/dL 39*   CREATININE mg/dL 1.61*   ANION GAP mmol/L 11   CALCIUM mg/dL 10.3*   ALBUMIN g/dL 4.2   TOTAL BILIRUBIN mg/dL 0.67   ALK PHOS U/L 85   ALT U/L 17   AST U/L 24   GLUCOSE RANDOM mg/dL 320*     Results from last 7 days   Lab Units 03/10/25  2301   INR  2.81*         Lab Results   Component Value Date    HGBA1C 6.0 01/19/2022     Results from last 7 days   Lab Units 03/11/25  0332 03/11/25  0043 03/10/25  2301   LACTIC ACID mmol/L 2.5* 2.3*  --    PROCALCITONIN ng/ml  --   --  0.46*       Imaging Results Review: I reviewed radiology reports from this admission including: CT chest and CT abdomen/pelvis.  Other Study Results Review: EKG was reviewed.     Administrative Statements   >75 min    ** Please Note: This note has been constructed using a voice recognition system. **

## 2025-03-11 NOTE — CONSULTS
Consultation - Surgery-General   Name: Jo Jason 84 y.o. female I MRN: [id number]  Unit/Bed#: ED-30 I Date of Admission: 3/10/2025   Date of Service: 3/11/2025 I Hospital Day: 0   Consult to surgery general  Consult performed by: Karlo Peralta MD  Consult ordered by: Radhames Adkins MD        Physician Requesting Evaluation: Maura Johnson DO   Reason for Evaluation / Principal Problem: Proctocolitis     Assessment & Plan  Proctocolitis  83yo F with hx of dementia brought in by nursing facility with hematochezia.  Ct scan significant for bowel thickening from descending colon to rectum c/w proctocolitis.  LA 2.1 from 2.5 from 2.3  Hgb 11.2 from 12.5  WBC 11.4    Plan  NPO, IV fluids  F/u GI consult  Recommend stool studies, calprotectin   Trend lactate, Hgb  Rest of care per primary team  Coronary artery disease involving native coronary artery of native heart with angina pectoris (HCC)    Benign hypertension with CKD (chronic kidney disease) stage IV (HCC)  Lab Results   Component Value Date    EGFR 29 03/10/2025    EGFR 26 07/15/2024    EGFR 24 04/08/2024    CREATININE 1.61 (H) 03/10/2025    CREATININE 1.76 (H) 07/15/2024    CREATININE 1.89 (H) 04/08/2024     Dyslipidemia    Hematochezia    Pancreatic lesion    Hyperglycemia    Lactic acidosis        History of Present Illness   Jo Jason is a 84 y.o. female with hx of CAD, CKD, dementia who presents with from nursing facility with hematochezia. Reportedly 1 large bloody BM. Rest of hx if limited due to pt dementia. No other persons in room to corroborate hx. She currently denies abdominal pain.    Review of Systems  Medical History Review: I have reviewed the patient's PMH, PSH, Social History, Family History, Meds, and Allergies     Objective :  Temp:  [97.4 °F (36.3 °C)] 97.4 °F (36.3 °C)  HR:  [60-87] 69  BP: (126-167)/(60-78) 150/70  Resp:  [16-18] 16  SpO2:  [96 %-100 %] 97 %  O2 Device: None (Room air)      Physical Exam  General:  confused  Neck: supple, no JVD  CV: nl rate  Lungs: nl wob. No resp distress  ABD: Soft, nontender, nondistended  Extrem: No CCE  Neuro: Alert, not oriented      Lab Results: I have reviewed the following results:  Recent Labs     03/10/25  2301 03/11/25  0043 03/11/25  0332   WBC 11.45*  --   --    HGB 12.5  --  11.2*   HCT 37.9  --  34.5*     --   --    BANDSPCT 10*  --   --    SODIUM 134*  --   --    K 4.2  --   --    CL 99  --   --    CO2 24  --   --    BUN 39*  --   --    CREATININE 1.61*  --   --    GLUC 320*  --   --    AST 24  --   --    ALT 17  --   --    ALB 4.2  --   --    TBILI 0.67  --   --    ALKPHOS 85  --   --    PTT 32  --   --    INR 2.81*  --   --    LACTICACID  --    < > 2.5*    < > = values in this interval not displayed.             VTE Pharmacologic Prophylaxis: VTE covered by:    None     VTE Mechanical Prophylaxis: sequential compression device

## 2025-03-11 NOTE — INCIDENTAL FINDINGS
The following findings require follow up:  Radiographic finding   Finding: Pancreatic lesion   Follow up required: 2 years   Follow up should be done within 2 years.    CT showing subcentimeter pancreatic cystic lesion  Recommend follow-up every 2 years.  Consider patient's age and clinical status to assess need for further follow-up.  Preferred imaging modality is abdomen MRI and MRCP with and without IV contrast ordered triple phase abdomen CT with IV contrast or abdomen MRI and MRCP without IV contrast.  Discussed finding with patients sister Mariposa who feels patient would not want this further worked up.      Please notify the following clinician to assist with the follow up: PCP    Incidental finding results were discussed with the Patient's POA by LUKE Martinez on 03/11/25.   They expressed understanding and all questions answered.

## 2025-03-11 NOTE — ASSESSMENT & PLAN NOTE
Noted to have episodes of bloody diarrhea nursing facility  Hemoglobin 12.5-> 11.2  No further bleeding in the ED  Noted to have elevated INR 2.81 not on anticoagulation  Given reversal in the ED  Suspect in the setting of proctocolitis versus ischemic colitis.  Surgery/GI following  Trend hemoglobin  Transfuse for hemoglobin less than 7

## 2025-03-11 NOTE — ASSESSMENT & PLAN NOTE
Patient presents from nursing home with vomiting and multiple episodes of bloody diarrhea.  Not on anticoagulation.+ ASA.  CT abdomen: Marked wall thickening and inflammation of the descending colon, sigmoid colon, and rectum consistent with proctocolitis.  Correlate for ischemia.  No evidence of high-volume GI bleed.  In ED patient was started on IV ceftriaxone and Flagyl  ED discussed with general surgery low concern for bowel ischemia; patient without any abdominal pain  Plan  Continue IV Rocephin/Flagyl ->  currently not meeting SIRS   Obtain stool enteric panel and fecal calprotectin  Surgery consult  GI consult  Continue IV fluids  Follow-up on blood cultures

## 2025-03-11 NOTE — ASSESSMENT & PLAN NOTE
Lactic 2.3-> 2.5  Continue to trend every 2 hours  Suspect in the setting of infection   Will give 500 mL fluid bolus now  And start on maintenance fluids

## 2025-03-11 NOTE — ASSESSMENT & PLAN NOTE
Blood sugar noted to be 320 on BMP  No prior history of diabetes last A1c in 2022  Obtain hemoglobin A1c

## 2025-03-11 NOTE — CONSULTS
Consultation - Gastroenterology   Name: Jo Jason 84 y.o. female I MRN: [id number]  Unit/Bed#: ED-30 I Date of Admission: 3/10/2025   Date of Service: 3/11/2025 I Hospital Day: 0   Inpatient consult to gastroenterology  Consult performed by: Daniel Shepherd PA-C  Consult ordered by: LUKE Martinez        Physician Requesting Evaluation: Juan Francisco Bills MD   Reason for Evaluation / Principal Problem: Hematochezia, proctocolitis    Assessment & Plan  Proctocolitis  -CT shows left-sided colitis consistent with possible ischemic, also diverticulosis, no high-volume GI bleeding  -Currently on ceftriaxone and metronidazole  -Stool enteric pathogen and fecal calprotectin ordered.  Nothing yet collected  -Add C. Difficile  -Pending patient's course can consider colonoscopy, but not planned at this time  -Will continue to observe and follow-up on stool studies  Hematochezia  -Top on the differential would include ischemic colitis versus infectious versus diverticular bleed.  Less likely inflammatory bowel disease or neoplasm  - follow hemoglobin and transfuse as needed  -Hemoglobin stable at 11.2  Pancreatic lesion  - CT 3/11/25 Subcentimeter pancreatic cystic lesion.-Management recommendation: Followup every 2 years. Endpoint determined by clinician. Consider patient's age and clinical status to assess need for further follow-up. Preferred imaging modality: abdomen MRI and MRCP   with and without IV contrast, or triple phase abdomen CT with IV contrast, or abdomen MRI and MRCP without IV contrast.          History of Present Illness   HPI:  Jo Jason is a 84 y.o. female with past medical history of coronary artery disease, chronic kidney disease stage V, dementia, hypertension and tremor who presents to the emergency room on March 10, 2025 with reports of hematochezia.  She comes from a nursing home.  There was 1 large episode of bloody diarrhea at the nursing home.  Just finished  recent Augmentin for folliculitis.    Hemoglobin 12.5 on admission currently at 11.2.  BUN was elevated at 39 down to 31 and creatinine normal at 1.20.  Mild lactic acidosis at 2.5.  CT shows marked wall thickening of the descending, sigmoid and rectum consistent with proctocolitis correlate for ischemia.  No high-volume GI bleeding noted.  Diverticulosis also incidentally noted.    Endoscopic History  EGD -nothing in the system  Colonoscopy -nothing in the system      Review of Systems   Constitutional:  Negative for activity change, appetite change, chills, diaphoresis, fatigue and unexpected weight change.   HENT:  Negative for mouth sores, sore throat and trouble swallowing.    Eyes:  Negative for pain, redness and visual disturbance.   Respiratory:  Negative for apnea, chest tightness and shortness of breath.    Cardiovascular:  Negative for chest pain and leg swelling.   Gastrointestinal:  Positive for anal bleeding. Negative for abdominal distention, abdominal pain, blood in stool, constipation, diarrhea, nausea and vomiting.   Genitourinary:  Negative for difficulty urinating, dysuria and hematuria.   Musculoskeletal:  Negative for arthralgias, back pain, gait problem, joint swelling and myalgias.   Skin:  Negative for color change, pallor and rash.   Allergic/Immunologic: Negative for environmental allergies and food allergies.   Neurological:  Negative for dizziness, weakness, light-headedness, numbness and headaches.   Psychiatric/Behavioral:  Positive for confusion. Negative for agitation and behavioral problems.      Historical Information   Past Medical History:   Diagnosis Date    Dyslipidemia     Hypertension     Tremor      Past Surgical History:   Procedure Laterality Date    CARDIAC SURGERY      CAROTID ARTERY ANGIOPLASTY      CATARACT EXTRACTION Bilateral     CLOSED MANIPULATION SHOULDER       Social History     Tobacco Use    Smoking status: Never     Passive exposure: Past    Smokeless tobacco:  Never   Vaping Use    Vaping status: Never Used   Substance and Sexual Activity    Alcohol use: Never    Drug use: Never    Sexual activity: Not on file     E-Cigarette/Vaping    E-Cigarette Use Never User      E-Cigarette/Vaping Substances       Social History     Tobacco Use    Smoking status: Never     Passive exposure: Past    Smokeless tobacco: Never   Vaping Use    Vaping status: Never Used   Substance and Sexual Activity    Alcohol use: Never    Drug use: Never    Sexual activity: Not on file       Current Facility-Administered Medications:     amLODIPine (NORVASC) tablet 5 mg, BID    [Held by provider] aspirin (ECOTRIN LOW STRENGTH) EC tablet 81 mg, Daily    atorvastatin (LIPITOR) tablet 40 mg, Daily    ceftriaxone (ROCEPHIN) 1 g/50 mL in dextrose IVPB, Q24H    ezetimibe (ZETIA) tablet 10 mg, Daily    losartan (COZAAR) tablet 50 mg, Daily    metroNIDAZOLE (FLAGYL) IVPB (premix) 500 mg 100 mL, Q8H    mirtazapine (REMERON) tablet 15 mg, HS    multi-electrolyte (Plasmalyte-A/Isolyte-S PH 7.4/Normosol-R) IV solution, Continuous, Last Rate: 75 mL/hr (03/11/25 0601)    pantoprazole (PROTONIX) EC tablet 40 mg, Early Morning  Prior to Admission Medications   Prescriptions Last Dose Informant Patient Reported? Taking?   acetaminophen (TYLENOL) 325 mg tablet  Outside Facility (Specify) Yes No   Sig: Take 650 mg by mouth every 4 (four) hours as needed for mild pain   amLODIPine (NORVASC) 5 mg tablet   No No   Sig: TAKE 1 TABLET BY MOUTH TWICE DAILY   Patient taking differently: Take 10 mg by mouth 2 (two) times a day   aspirin (ECOTRIN LOW STRENGTH) 81 mg EC tablet  Outside Facility (Specify) No No   Sig: Take 1 tablet (81 mg total) by mouth daily   atorvastatin (LIPITOR) 40 mg tablet  Outside Facility (Specify) No No   Sig: Take 1 tablet (40 mg total) by mouth daily   bisacodyl (FLEET) 10 MG/30ML ENEM  Outside Facility (Specify) Yes No   Sig: Insert 10 mg into the rectum if needed for constipation On day 4 of no BM    clobetasol (TEMOVATE) 0.05 % ointment  Outside Facility (Specify) No No   Sig: Apply topically 2 (two) times a week   estradiol (ESTRACE VAGINAL) 0.1 mg/g vaginal cream  Outside Facility (Specify) No No   Sig: Apply a pea sized amount to vulva twice weekly   ezetimibe (ZETIA) 10 mg tablet  Outside Facility (Specify) No No   Sig: Take 1 tablet (10 mg total) by mouth daily   losartan (COZAAR) 50 mg tablet  Outside Facility (Specify) No No   Sig: Take 1 tablet (50 mg total) by mouth daily   magnesium hydroxide (MILK OF MAGNESIA) 400 mg/5 mL oral suspension  Outside Facility (Specify) Yes No   Sig: Take 30 mL by mouth daily as needed for constipation Give day 2 of no BM, repeat on day 3 of no BM   mirtazapine (REMERON) 15 mg tablet  Outside Facility (Specify) No No   Sig: Take 1 tablet (15 mg total) by mouth daily at bedtime   nitroglycerin (NITROSTAT) 0.4 mg SL tablet  Outside Facility (Specify) No No   Sig: Place 1 tablet (0.4 mg total) under the tongue every 5 (five) minutes as needed for chest pain   polyethylene glycol (MIRALAX) 17 g packet  Outside Facility (Specify) Yes No   Sig: Take 17 g by mouth if needed (constipation) Give day 2 of no BM, repeat day 3 of no BM   senna (SENOKOT) 8.6 MG tablet  Outside Facility (Specify) Yes No   Sig: Take 1 tablet by mouth if needed for constipation (Give day 2 of no BM, repeat day 3 of no BM)      Facility-Administered Medications: None     Patient has no known allergies.    Objective :  Temp:  [97.4 °F (36.3 °C)] 97.4 °F (36.3 °C)  HR:  [60-87] 64  BP: (116-167)/(53-78) 116/53  Resp:  [16-18] 16  SpO2:  [96 %-100 %] 97 %  O2 Device: None (Room air)    Physical Exam  Vitals reviewed.   Constitutional:       General: She is not in acute distress.     Appearance: Normal appearance. She is not ill-appearing.   HENT:      Head: Normocephalic and atraumatic.   Eyes:      General: No scleral icterus.     Conjunctiva/sclera: Conjunctivae normal.   Cardiovascular:      Rate and  Rhythm: Normal rate and regular rhythm.   Pulmonary:      Effort: Pulmonary effort is normal. No respiratory distress.      Breath sounds: Normal breath sounds.   Abdominal:      General: Bowel sounds are normal. There is no distension.      Palpations: Abdomen is soft.      Tenderness: There is no abdominal tenderness. There is no guarding.   Skin:     General: Skin is warm and dry.   Neurological:      Mental Status: She is alert.      Comments: Alert but only oriented to self   Psychiatric:         Mood and Affect: Mood normal.         Behavior: Behavior normal.           Lab Results: I have reviewed the following results:CBC/BMP:   .     03/10/25  2301 03/11/25  0332   WBC 11.45*  --    HGB 12.5 11.2*   HCT 37.9 34.5*     --    BANDSPCT 10*  --    SODIUM 134*  --    K 4.2  --    CL 99  --    CO2 24  --    BUN 39*  --    CREATININE 1.61*  --    GLUC 320*  --     , LFTs:   .     03/10/25  2301   AST 24   ALT 17   ALB 4.2   TBILI 0.67   ALKPHOS 85    , PTT/INR:  .     03/10/25  2301 03/11/25  0606   PTT 32  --    INR 2.81* 1.50*        Imaging Results Review: I reviewed radiology reports from this admission including: CT chest and CT abdomen/pelvis.  Other Study Results Review: No additional pertinent studies reviewed.        Daniel Shepherd PA-C  [facility] - Gastroentrology

## 2025-03-11 NOTE — ASSESSMENT & PLAN NOTE
-CT shows left-sided colitis consistent with possible ischemic, also diverticulosis, no high-volume GI bleeding  -Currently on ceftriaxone and metronidazole  -Stool enteric pathogen and fecal calprotectin ordered.  Nothing yet collected  -Add C. Difficile  -Pending patient's course can consider colonoscopy, but not planned at this time  -Will continue to observe and follow-up on stool studies

## 2025-03-11 NOTE — ASSESSMENT & PLAN NOTE
Lab Results   Component Value Date    EGFR 29 03/10/2025    EGFR 26 07/15/2024    EGFR 24 04/08/2024    CREATININE 1.61 (H) 03/10/2025    CREATININE 1.76 (H) 07/15/2024    CREATININE 1.89 (H) 04/08/2024   Creatinine stable  Continue PTA antihypertensive regimen

## 2025-03-11 NOTE — ED PROVIDER NOTES
Time reflects when diagnosis was documented in both MDM as applicable and the Disposition within this note       Time User Action Codes Description Comment    3/11/2025  3:15 AM Tarik Bailey Add [K92.1] Hematochezia     3/11/2025  4:07 AM Tarik Bailey Add [K52.9] Proctocolitis     3/11/2025  4:07 AM Tarik Bailey Modify [K92.1] Hematochezia     3/11/2025  4:07 AM Tarik Bailey Modify [K52.9] Proctocolitis     3/11/2025  4:07 AM Tarik Bailey Add [D72.825] Bandemia     3/11/2025  4:08 AM Tarik Bailey Add [D64.9] Anemia     3/11/2025  4:09 AM Tarik Bailey Add [R73.9] Elevated random blood glucose level           ED Disposition       ED Disposition   Admit    Condition   Stable    Date/Time   Tue Mar 11, 2025  4:05 AM    Comment   Case was discussed with Tavo Tran and the patient's admission status was agreed to be Admission Status: inpatient status to the service of Dr. Johnson .               Assessment & Plan       Medical Decision Making  84-year-old female presents with rectal bleeding  At risk for diverticular bleed, ischemic colitis, ulcerative colitis, infectious colitis, proctitis, anal fissure, hemorrhoids, colonic mass, etc.  Patient is but appears to have a lower GI bleed  CMP shows mild elevation in BUN otherwise normal kidney and hepatic function-elevation likely secondary to red blood cell breakdown  Initial CBC shows leukocytosis with bandemia but no anemia at this time  EKG shows no ST segment changes indicative of ischemia  Initial lactic acid of 2.3 will start with fluids and continue infectious workup  Patient procalcitonin elevated at 0.46  Patient's blood sugar elevated greater than 300 will get beta hydroxybutyrate and VBG  VBG shows no acidosis and no beta hydroxybutyrate  Patient started on cefepime and vancomycin  H&H repeated which shows hemoglobin now down to 11.2 with hematocrit of 30.55  Flu/COVID rapid antigen pan negative  UA shows no evidence of acute urinary tract  infection  Lipase of 150  Chest x-ray shows no acute cardiopulmonary abnormalities  CT chest abdomen pelvis shows marked wall thickening with inflammation in descending colon and sigmoid colon likely consistent with and asked to correlate for ischemia  Patient does have elevated lactate however no abdominal pain  Added Flagyl to antibiotic regimen  Consulted general surgery to evaluate for ischemic colitis  Discussed case with Slim, patient to be excepted to medicine with surgical consultation  Discussed findings with patient, patient has no questions, in stable condition and cleared for discharge      Amount and/or Complexity of Data Reviewed  Labs: ordered.  Radiology: ordered and independent interpretation performed. Decision-making details documented in ED Course.    Risk  Prescription drug management.  Decision regarding hospitalization.        ED Course as of 03/11/25 0854   Mon Mar 10, 2025   2346 Patient had short episode of bradycardia that resolved after approximately 20 seconds.  Patient sitting up in bed with good peripheral perfusion at this time.   Tue Mar 11, 2025   0016 Spoke with nursing facility.  Patient had vomiting earlier in the day before her bloody diarrhea.  Patient was also on antibiotics undergoing treatment for folliculitis.  Patient takes 81 mg baby aspirin daily but is not on any other blood thinners.   0109 CT chest w ct abdomen pelvis w wo contrast       Medications   multi-electrolyte (Plasmalyte-A/Isolyte-S PH 7.4/Normosol-R) IV solution (75 mL/hr Intravenous New Bag 3/11/25 0601)   amLODIPine (NORVASC) tablet 5 mg (has no administration in time range)   losartan (COZAAR) tablet 50 mg (has no administration in time range)   mirtazapine (REMERON) tablet 15 mg (has no administration in time range)   atorvastatin (LIPITOR) tablet 40 mg (has no administration in time range)   aspirin (ECOTRIN LOW STRENGTH) EC tablet 81 mg ( Oral Held Dose 3/14/25 0900)   ezetimibe (ZETIA) tablet 10 mg  (has no administration in time range)   pantoprazole (PROTONIX) EC tablet 40 mg (40 mg Oral Not Given 3/11/25 0611)   ceftriaxone (ROCEPHIN) 1 g/50 mL in dextrose IVPB (has no administration in time range)   metroNIDAZOLE (FLAGYL) IVPB (premix) 500 mg 100 mL (has no administration in time range)   pantoprazole (PROTONIX) injection 40 mg (40 mg Intravenous Given 3/10/25 2305)   ondansetron (ZOFRAN) injection 4 mg (4 mg Intravenous Given 3/11/25 0151)   vancomycin (VANCOCIN) IVPB (premix in dextrose) 1,000 mg 200 mL (0 mg Intravenous Stopped 3/11/25 0320)   cefepime (MAXIPIME) 1,000 mg in dextrose 5 % 50 mL IVPB (0 mg Intravenous Stopped 3/11/25 0129)   sodium chloride 0.9 % bolus 1,000 mL (0 mL Intravenous Stopped 3/11/25 0235)   phytonadione (AQUA-MEPHYTON) 10 mg/mL 10 mg in sodium chloride 0.9 % 50 mL IVPB (0 mg Intravenous Stopped 3/11/25 0140)   prothrombin complex concentrate (human) (Kcentra) 1,500 Units (1,500 Units Intravenous Given 3/11/25 0146)   iohexol (OMNIPAQUE) 350 MG/ML injection (MULTI-DOSE) 85 mL (85 mL Intravenous Given 3/11/25 0210)   metroNIDAZOLE (FLAGYL) IVPB (premix) 500 mg 100 mL (0 mg Intravenous Stopped 3/11/25 0424)   sodium chloride 0.9 % bolus 500 mL (0 mL Intravenous Stopped 3/11/25 0600)   multi-electrolyte (Plasmalyte-A/Isolyte-S PH 7.4/Normosol-R) IV bolus 500 mL (0 mL Intravenous Stopped 3/11/25 0600)       ED Risk Strat Scores                                                History of Present Illness       Chief Complaint   Patient presents with    Rectal Bleeding     Patient arrives via EMS from [facility] after having a large bowel movement with blood and clots around 2145. Patient feels nauseous on arrival.         Past Medical History:   Diagnosis Date    Dyslipidemia     Hypertension     Tremor       Past Surgical History:   Procedure Laterality Date    CARDIAC SURGERY      CAROTID ARTERY ANGIOPLASTY      CATARACT EXTRACTION Bilateral     CLOSED MANIPULATION SHOULDER         Family History   Problem Relation Age of Onset    Coronary artery disease Mother       Social History     Tobacco Use    Smoking status: Never     Passive exposure: Past    Smokeless tobacco: Never   Vaping Use    Vaping status: Never Used   Substance Use Topics    Alcohol use: Never    Drug use: Never      E-Cigarette/Vaping    E-Cigarette Use Never User       E-Cigarette/Vaping Substances      I have reviewed and agree with the history as documented.     84-year-old female presents from [facility] via EMS for concerns of GI bleed.  According EMS, facility staff reported the patient was feeling nauseous and they noticed that she had a large bowel movement.  Bowel movement was liquid with bright red blood.  Patient was also passing some clots in her stool.  Patient does not have a history of any recent GI bleeds.  Patient is not complaining of any pain at this time but states she is nauseous.  Is demented at baseline and a poor historian.          Review of Systems   Constitutional:  Negative for chills and fever.   HENT:  Negative for ear pain and sore throat.    Eyes:  Negative for pain and visual disturbance.   Respiratory:  Negative for cough and shortness of breath.    Cardiovascular:  Negative for chest pain and palpitations.   Gastrointestinal:  Positive for abdominal pain, blood in stool, diarrhea and nausea. Negative for vomiting.   Genitourinary:  Negative for dysuria and hematuria.   Musculoskeletal:  Negative for arthralgias and back pain.   Skin:  Negative for color change and rash.   Neurological:  Negative for dizziness, seizures, syncope and light-headedness.   All other systems reviewed and are negative.          Objective       ED Triage Vitals   Temperature Pulse Blood Pressure Respirations SpO2 Patient Position - Orthostatic VS   03/10/25 2236 03/10/25 2232 03/10/25 2232 03/10/25 2232 03/10/25 2232 03/10/25 2232   (!) 97.4 °F (36.3 °C) 67 148/67 18 100 % Sitting      Temp Source Heart  Rate Source BP Location FiO2 (%) Pain Score    03/10/25 2236 03/11/25 0245 03/10/25 2232 -- 03/10/25 2232    Oral Monitor Right arm  No Pain      Vitals      Date and Time Temp Pulse SpO2 Resp BP Pain Score FACES Pain Rating User   03/11/25 0800 -- 68 99 % -- 129/57 -- -- CF   03/11/25 0700 -- 64 97 % -- 116/53 -- -- KB   03/11/25 0615 -- 72 98 % -- -- -- -- KB   03/11/25 0530 -- 67 98 % -- 130/59 -- -- KB   03/11/25 0430 -- 69 97 % -- 150/70 -- -- KB   03/11/25 0415 -- 72 97 % -- -- -- -- KB   03/11/25 0330 -- 73 97 % -- 150/70 -- -- KB   03/11/25 0300 -- 74 97 % -- 149/66 -- -- KB   03/11/25 0245 -- 87 96 % 16 167/78 -- -- DB   03/11/25 0030 -- 63 96 % -- 148/66 -- -- KB   03/11/25 0000 -- 67 97 % -- 137/64 -- -- KB   03/10/25 2300 -- 60 98 % -- 126/60 -- -- KB   03/10/25 2236 97.4 °F (36.3 °C) -- -- -- -- -- -- KB   03/10/25 2234 -- -- 100 % -- -- -- -- KB   03/10/25 2232 -- 67 100 % 18 148/67 No Pain -- KB            Physical Exam  Vitals and nursing note reviewed. Exam conducted with a chaperone present.   Constitutional:       General: She is in acute distress.      Appearance: She is well-developed. She is ill-appearing. She is not toxic-appearing or diaphoretic.      Comments: Frail-appearing elderly patient   HENT:      Head: Normocephalic and atraumatic.      Mouth/Throat:      Mouth: Mucous membranes are moist.   Eyes:      General: No scleral icterus.        Right eye: No discharge.         Left eye: No discharge.      Conjunctiva/sclera: Conjunctivae normal.      Comments: Pale sclera noted.   Cardiovascular:      Rate and Rhythm: Normal rate and regular rhythm.      Heart sounds: No murmur heard.  Pulmonary:      Effort: Pulmonary effort is normal. No respiratory distress.      Breath sounds: Normal breath sounds. No stridor. No wheezing, rhonchi or rales.   Abdominal:      General: There is no distension.      Palpations: Abdomen is soft.      Tenderness: There is abdominal tenderness in the  suprapubic area. There is no right CVA tenderness, left CVA tenderness or guarding.      Comments: Patient admits to minor suprapubic and midepigastric tenderness.   Genitourinary:     Comments: No or external hemorrhoids.  No bleeding from vagina or labia lacerations noted.  Musculoskeletal:         General: No swelling.      Cervical back: Neck supple.      Right lower leg: No edema.      Left lower leg: No edema.   Skin:     General: Skin is warm and dry.      Capillary Refill: Capillary refill takes less than 2 seconds.      Coloration: Skin is not jaundiced or pale.   Neurological:      Mental Status: She is alert.   Psychiatric:         Mood and Affect: Mood normal.         Results Reviewed       Procedure Component Value Units Date/Time    RBC Morphology Reflex Test [[id number]] Collected: 03/11/25 0606    Lab Status: Final result Specimen: Blood from Arm, Right Updated: 03/11/25 0801    Blood culture #1 [[id number]] Collected: 03/11/25 0043    Lab Status: Preliminary result Specimen: Blood from Arm, Right Updated: 03/11/25 0801     Blood Culture Received in Microbiology Lab. Culture in Progress.    Basic metabolic panel [[id number]]  (Abnormal) Collected: 03/11/25 0606    Lab Status: Final result Specimen: Blood from Arm, Right Updated: 03/11/25 0753     Sodium 135 mmol/L      Potassium 4.1 mmol/L      Chloride 104 mmol/L      CO2 21 mmol/L      ANION GAP 10 mmol/L      BUN 31 mg/dL      Creatinine 1.20 mg/dL      Glucose 244 mg/dL      Calcium 8.6 mg/dL      eGFR 41 ml/min/1.73sq m     Narrative:      National Kidney Disease Foundation guidelines for Chronic Kidney Disease (CKD):     Stage 1 with normal or high GFR (GFR > 90 mL/min/1.73 square meters)    Stage 2 Mild CKD (GFR = 60-89 mL/min/1.73 square meters)    Stage 3A Moderate CKD (GFR = 45-59 mL/min/1.73 square meters)    Stage 3B Moderate CKD (GFR = 30-44 mL/min/1.73 square meters)    Stage 4 Severe CKD (GFR = 15-29 mL/min/1.73 square meters)    Stage 5  End Stage CKD (GFR <15 mL/min/1.73 square meters)  Note: GFR calculation is accurate only with a steady state creatinine    CBC and differential [[id number]]  (Abnormal) Collected: 03/11/25 0606    Lab Status: Final result Specimen: Blood from Arm, Right Updated: 03/11/25 0753     WBC 9.52 Thousand/uL      RBC 3.34 Million/uL      Hemoglobin 10.3 g/dL      Hematocrit 31.7 %      MCV 95 fL      MCH 30.8 pg      MCHC 32.5 g/dL      RDW 13.1 %      MPV 10.4 fL      Platelets 148 Thousands/uL     Manual Differential(PHLEBS Do Not Order) [[id number]]  (Abnormal) Collected: 03/11/25 0606    Lab Status: Final result Specimen: Blood from Arm, Right Updated: 03/11/25 0753     Segmented % 86 %      Bands % 8 %      Lymphocytes % 2 %      Monocytes % 4 %      Eosinophils % 0 %      Basophils % 0 %      Absolute Neutrophils 8.95 Thousand/uL      Absolute Lymphocytes 0.19 Thousand/uL      Absolute Monocytes 0.38 Thousand/uL      Absolute Eosinophils 0.00 Thousand/uL      Absolute Basophils 0.00 Thousand/uL      Total Counted --     RBC Morphology Present     Platelet Estimate Adequate     Anisocytosis Present     [place] Cells Present     Ovalocytes Present     Poikilocytes Present    Clostridium difficile toxin by PCR with EIA [[id number]]     Lab Status: No result Specimen: Stool from Rectum     Protime-INR [[id number]]  (Abnormal) Collected: 03/11/25 0606    Lab Status: Final result Specimen: Blood from Arm, Right Updated: 03/11/25 0702     Protime 18.8 seconds      INR 1.50    Narrative:      INR Therapeutic Range    Indication                                             INR Range      Atrial Fibrillation                                               2.0-3.0  Hypercoagulable State                                    2.0.2.3  Left Ventricular Asist Device                            2.0-3.0  Mechanical Heart Valve                                  -    Aortic(with afib, MI, embolism, HF, LA enlargement,    and/or coagulopathy)                                      2.0-3.0 (2.5-3.5)     Mitral                                                             2.5-3.5  Prosthetic/Bioprosthetic Heart Valve               2.0-3.0  Venous thromboembolism (VTE: VT, PE        2.0-3.0    Lactic acid, plasma (w/reflex if result > 2.0) [[id number]]  (Abnormal) Collected: 03/11/25 0606    Lab Status: Final result Specimen: Blood from Arm, Right Updated: 03/11/25 0655     LACTIC ACID 2.1 mmol/L     Narrative:      Result may be elevated if tourniquet was used during collection.    Lactic acid 2 Hours [[id number]]     Lab Status: No result Specimen: Blood     MRSA culture [[id number]] Collected: 03/11/25 0608    Lab Status: In process Specimen: Nares from Nose Updated: 03/11/25 0631    Calprotectin,Fecal [[id number]]     Lab Status: No result Specimen: Stool     Hemoglobin A1C [[id number]] Collected: 03/11/25 0332    Lab Status: In process Specimen: Blood from Arm, Right Updated: 03/11/25 0448    Stool Enteric Bacterial Panel by PCR [[id number]]     Lab Status: No result Specimen: Stool     Lactic acid 2 Hours [[id number]]  (Abnormal) Collected: 03/11/25 0332    Lab Status: Final result Specimen: Blood from Arm, Right Updated: 03/11/25 0411     LACTIC ACID 2.5 mmol/L     Narrative:      Result may be elevated if tourniquet was used during collection.    Hemoglobin and hematocrit, blood [[id number]]  (Abnormal) Collected: 03/11/25 0332    Lab Status: Final result Specimen: Blood from Arm, Right Updated: 03/11/25 0345     Hemoglobin 11.2 g/dL      Hematocrit 34.5 %     FLU/COVID Rapid Antigen (30 min. TAT) - Preferred screening test in ED [[id number]]  (Normal) Collected: 03/11/25 0130    Lab Status: Final result Specimen: Nares from Nose Updated: 03/11/25 0209     SARS COV Rapid Antigen Negative     Influenza A Rapid Antigen Negative     Influenza B Rapid Antigen Negative    Narrative:      This test has been performed using the [employer] Lori 2 FLU+SARS Antigen test under the  Emergency Use Authorization (EUA). This test has been validated by the  and verified by the performing laboratory. The Lori uses lateral flow immunofluorescent sandwich assay to detect SARS-COV, Influenza A and Influenza B Antigen.     The [employer] Lori 2 SARS Antigen test does not differentiate between SARS-CoV and SARS-CoV-2.     Negative results are presumptive and may be confirmed with a molecular assay, if necessary, for patient management. Negative results do not rule out SARS-CoV-2 or influenza infection and should not be used as the sole basis for treatment or patient management decisions. A negative test result may occur if the level of antigen in a sample is below the limit of detection of this test.     Positive results are indicative of the presence of viral antigens, but do not rule out bacterial infection or co-infection with other viruses.     All test results should be used as an adjunct to clinical observations and other information available to the provider.    FOR PEDIATRIC PATIENTS - copy/paste COVID Guidelines URL to browser: [email or web address]    Lactic acid [[id number]]  (Abnormal) Collected: 03/11/25 0043    Lab Status: Final result Specimen: Blood from Arm, Left Updated: 03/11/25 0137     LACTIC ACID 2.3 mmol/L     Narrative:      Result may be elevated if tourniquet was used during collection.    Blood culture #2 [[id number]] Collected: 03/11/25 0043    Lab Status: In process Specimen: Blood from Arm, Left Updated: 03/11/25 0112    Blood gas, venous [[id number]]  (Abnormal) Collected: 03/11/25 0043    Lab Status: Final result Specimen: Blood from Arm, Right Updated: 03/11/25 0112     pH, Esteban 7.387     pCO2, Esteban 34.3 mm Hg      pO2, Esteban 116.2 mm Hg      HCO3, Esteban 20.2 mmol/L      Base Excess, Esteban -4.1 mmol/L      O2 Content, Esteban 16.2 ml/dL      O2 HGB, VENOUS 94.2 %     Procalcitonin [[id number]]  (Abnormal) Collected: 03/10/25  2301    Lab Status: Final result Specimen: Blood from Arm, Left Updated: 03/11/25 0024     Procalcitonin 0.46 ng/ml     Beta Hydroxybutyrate [[id number]]  (Normal) Collected: 03/10/25 2301    Lab Status: Final result Specimen: Blood from Arm, Left Updated: 03/11/25 0024     Beta- Hydroxybutyrate 0.11 mmol/L     Lipase [[id number]]  (Abnormal) Collected: 03/10/25 2301    Lab Status: Final result Specimen: Blood from Arm, Left Updated: 03/11/25 0024     Lipase 150 u/L     RBC Morphology Reflex Test [[id number]] Collected: 03/10/25 2301    Lab Status: Final result Specimen: Blood from Arm, Left Updated: 03/11/25 0001    Urine culture [[id number]] Collected: 03/10/25 2344    Lab Status: In process Specimen: Urine, Straight Cath Updated: 03/10/25 2358    Urine Microscopic [[id number]]  (Normal) Collected: 03/10/25 2344    Lab Status: Final result Specimen: Urine, Straight Cath Updated: 03/10/25 2355     RBC, UA 1-2 /hpf      WBC, UA 1-2 /hpf      Epithelial Cells Occasional /hpf      Bacteria, UA None Seen /hpf     UA w Reflex to Microscopic w Reflex to Culture [[id number]]  (Abnormal) Collected: 03/10/25 2344    Lab Status: Final result Specimen: Urine, Straight Cath Updated: 03/10/25 2354     Color, UA Light Orange     Clarity, UA Clear     Specific Gravity, UA 1.016     pH, UA 5.5     Leukocytes, UA Negative     Nitrite, UA Negative     Protein, UA Trace mg/dl      Glucose, UA Trace mg/dl      Ketones, UA Negative mg/dl      Urobilinogen, UA 2.0 mg/dl      Bilirubin, UA Negative     Occult Blood, UA Negative    CBC and differential [[id number]]  (Abnormal) Collected: 03/10/25 2301    Lab Status: Final result Specimen: Blood from Arm, Left Updated: 03/10/25 2347     WBC 11.45 Thousand/uL      RBC 4.00 Million/uL      Hemoglobin 12.5 g/dL      Hematocrit 37.9 %      MCV 95 fL      MCH 31.3 pg      MCHC 33.0 g/dL      RDW 13.2 %      MPV 10.0 fL      Platelets 255 Thousands/uL     Narrative:      This is an appended report.   These results have been appended to a previously verified report.    Manual Differential(PHLEBS Do Not Order) [[id number]]  (Abnormal) Collected: 03/10/25 2301    Lab Status: Final result Specimen: Blood from Arm, Left Updated: 03/10/25 2347     Segmented % 87 %      Bands % 10 %      Lymphocytes % 1 %      Monocytes % 2 %      Eosinophils % 0 %      Basophils % 0 %      Absolute Neutrophils 11.11 Thousand/uL      Absolute Lymphocytes 0.11 Thousand/uL      Absolute Monocytes 0.23 Thousand/uL      Absolute Eosinophils 0.00 Thousand/uL      Absolute Basophils 0.00 Thousand/uL      Total Counted --     RBC Morphology Present     Platelet Estimate Adequate     Acanthocytes Present     Ovalocytes Present     Poikilocytes Present    Comprehensive metabolic panel [[id number]]  (Abnormal) Collected: 03/10/25 2301    Lab Status: Final result Specimen: Blood from Arm, Left Updated: 03/10/25 2344     Sodium 134 mmol/L      Potassium 4.2 mmol/L      Chloride 99 mmol/L      CO2 24 mmol/L      ANION GAP 11 mmol/L      BUN 39 mg/dL      Creatinine 1.61 mg/dL      Glucose 320 mg/dL      Calcium 10.3 mg/dL      AST 24 U/L      ALT 17 U/L      Alkaline Phosphatase 85 U/L      Total Protein 7.3 g/dL      Albumin 4.2 g/dL      Total Bilirubin 0.67 mg/dL      eGFR 29 ml/min/1.73sq m     Narrative:      National Kidney Disease Foundation guidelines for Chronic Kidney Disease (CKD):     Stage 1 with normal or high GFR (GFR > 90 mL/min/1.73 square meters)    Stage 2 Mild CKD (GFR = 60-89 mL/min/1.73 square meters)    Stage 3A Moderate CKD (GFR = 45-59 mL/min/1.73 square meters)    Stage 3B Moderate CKD (GFR = 30-44 mL/min/1.73 square meters)    Stage 4 Severe CKD (GFR = 15-29 mL/min/1.73 square meters)    Stage 5 End Stage CKD (GFR <15 mL/min/1.73 square meters)  Note: GFR calculation is accurate only with a steady state creatinine    Protime-INR [[id number]]  (Abnormal) Collected: 03/10/25 2301    Lab Status: Final result Specimen: Blood  from Arm, Left Updated: 03/10/25 2331     Protime 30.2 seconds      INR 2.81    Narrative:      INR Therapeutic Range    Indication                                             INR Range      Atrial Fibrillation                                               2.0-3.0  Hypercoagulable State                                    2.0.2.3  Left Ventricular Asist Device                            2.0-3.0  Mechanical Heart Valve                                  -    Aortic(with afib, MI, embolism, HF, LA enlargement,    and/or coagulopathy)                                     2.0-3.0 (2.5-3.5)     Mitral                                                             2.5-3.5  Prosthetic/Bioprosthetic Heart Valve               2.0-3.0  Venous thromboembolism (VTE: VT, PE        2.0-3.0    APTT [[id number]]  (Normal) Collected: 03/10/25 2301    Lab Status: Final result Specimen: Blood from Arm, Left Updated: 03/10/25 2331     PTT 32 seconds             CT chest w ct abdomen pelvis w wo contrast   Final Interpretation by Rob Higgins DO (03/11 0252)      Marked wall thickening and inflammation of the descending colon, sigmoid colon, and rectum, consistent with proctocolitis. Correlate for ischemia. No high volume GI bleed identified.         Subcentimeter pancreatic cystic lesion.-Management recommendation: Followup every 2 years. Endpoint determined by clinician. Consider patient's age and clinical status to assess need for further follow-up. Preferred imaging modality: abdomen MRI and MRCP    with and without IV contrast, or triple phase abdomen CT with IV contrast, or abdomen MRI and MRCP without IV contrast.      Management and follow up recommendations for cystic pancreatic lesions are based on Institutional consensus and international evidence-based Kyoto guidelines for the management of intraductal papillary mucinous neoplasm of the pancreas. Pancreatology 24    2024) 255-270.      The study was marked in EPIC for immediate  notification.      Workstation performed: [id number]         XR chest 1 view portable   ED Interpretation by Tarik Bailey DO (03/11 0027)   No acute cardiopulmonary changes          ECG 12 Lead Documentation Only    Date/Time: 3/11/2025 12:28 AM    Performed by: Tarik Bailey DO  Authorized by: Tarik Bailey DO    Indications / Diagnosis:  GI bleed  ECG reviewed by me, the ED Provider: yes    Patient location:  ED  Previous ECG:     Previous ECG:  Compared to current    Comparison ECG info:  January 26, 2000    Similarity:  Changes noted    Comparison to cardiac monitor: Yes    Interpretation:     Interpretation: abnormal    Rate:     ECG rate:  65    ECG rate assessment: normal    Rhythm:     Rhythm: sinus rhythm    Ectopy:     Ectopy: none    QRS:     QRS axis:  Normal    QRS intervals:  Normal  Conduction:     Conduction: abnormal      Abnormal conduction: 1st degree    ST segments:     ST segments:  Non-specific  T waves:     T waves: normal    Comments:      Sinus rhythm with first-degree AV block.  Rate of 65.  Nonspecific ST and T wave abnormalities.  No acute changes indicative of ischemia.      ED Medication and Procedure Management   Prior to Admission Medications   Prescriptions Last Dose Informant Patient Reported? Taking?   acetaminophen (TYLENOL) 325 mg tablet  Outside Facility (Specify) Yes No   Sig: Take 650 mg by mouth every 4 (four) hours as needed for mild pain   amLODIPine (NORVASC) 5 mg tablet   No No   Sig: TAKE 1 TABLET BY MOUTH TWICE DAILY   Patient taking differently: Take 10 mg by mouth 2 (two) times a day   aspirin (ECOTRIN LOW STRENGTH) 81 mg EC tablet  Outside Facility (Specify) No No   Sig: Take 1 tablet (81 mg total) by mouth daily   atorvastatin (LIPITOR) 40 mg tablet  Outside Facility (Specify) No No   Sig: Take 1 tablet (40 mg total) by mouth daily   bisacodyl (FLEET) 10 MG/30ML ENEM  Outside Facility (Specify) Yes No   Sig: Insert 10 mg into the rectum if needed for constipation On  day 4 of no BM   clobetasol (TEMOVATE) 0.05 % ointment  Outside Facility (Specify) No No   Sig: Apply topically 2 (two) times a week   estradiol (ESTRACE VAGINAL) 0.1 mg/g vaginal cream  Outside Facility (Specify) No No   Sig: Apply a pea sized amount to vulva twice weekly   ezetimibe (ZETIA) 10 mg tablet  Outside Facility (Specify) No No   Sig: Take 1 tablet (10 mg total) by mouth daily   losartan (COZAAR) 50 mg tablet  Outside Facility (Specify) No No   Sig: Take 1 tablet (50 mg total) by mouth daily   magnesium hydroxide (MILK OF MAGNESIA) 400 mg/5 mL oral suspension  Outside Facility (Specify) Yes No   Sig: Take 30 mL by mouth daily as needed for constipation Give day 2 of no BM, repeat on day 3 of no BM   mirtazapine (REMERON) 15 mg tablet  Outside Facility (Specify) No No   Sig: Take 1 tablet (15 mg total) by mouth daily at bedtime   nitroglycerin (NITROSTAT) 0.4 mg SL tablet  Outside Facility (Specify) No No   Sig: Place 1 tablet (0.4 mg total) under the tongue every 5 (five) minutes as needed for chest pain   polyethylene glycol (MIRALAX) 17 g packet  Outside Facility (Specify) Yes No   Sig: Take 17 g by mouth if needed (constipation) Give day 2 of no BM, repeat day 3 of no BM   senna (SENOKOT) 8.6 MG tablet  Outside Facility (Specify) Yes No   Sig: Take 1 tablet by mouth if needed for constipation (Give day 2 of no BM, repeat day 3 of no BM)      Facility-Administered Medications: None     Patient's Medications   Discharge Prescriptions    No medications on file     No discharge procedures on file.  ED SEPSIS DOCUMENTATION   Time reflects when diagnosis was documented in both MDM as applicable and the Disposition within this note       Time User Action Codes Description Comment    3/11/2025  3:15 AM Tarik Bailey Add [K92.1] Hematochezia     3/11/2025  4:07 AM Tarik Bailey Add [K52.9] Proctocolitis     3/11/2025  4:07 AM Tarik Bailey Modify [K92.1] Hematochezia     3/11/2025  4:07 AM Tarik Bailey Modify  [K52.9] Proctocolitis     3/11/2025  4:07 AM Tarik Bailey [D72.825] Bandemia     3/11/2025  4:08 AM Tarik Bailey [D64.9] Anemia     3/11/2025  4:09 AM Tarik Bailey [R73.9] Elevated random blood glucose level            Initial Sepsis Screening       Row Name 03/11/25 0132 03/11/25 0031             Is the patient's history suggestive of a new or worsening infection? Yes (Proceed)  -RM Yes (Proceed)  -RM       Suspected source of infection suspect infection, source unknown  -RM suspect infection, source unknown  -RM       Indicate SIRS criteria -- --       Are two or more of the above signs & symptoms of infection both present and new to the patient? No  -RM No  -RM                 User Key  (r) = Recorded By, (t) = Taken By, (c) = Cosigned By      Initials Name Provider Type    RM Tarik Bailey DO Resident                       Tarik Bailey DO  03/11/25 0854

## 2025-03-12 PROBLEM — D62 ACUTE BLOOD LOSS ANEMIA: Status: ACTIVE | Noted: 2025-03-12

## 2025-03-12 LAB
BACTERIA UR CULT: NORMAL
GLUCOSE SERPL-MCNC: 106 MG/DL (ref 65–140)
GLUCOSE SERPL-MCNC: 108 MG/DL (ref 65–140)
GLUCOSE SERPL-MCNC: 114 MG/DL (ref 65–140)
HCT VFR BLD AUTO: 26.8 % (ref 34.8–46.1)
HCT VFR BLD AUTO: 29.9 % (ref 34.8–46.1)
HCT VFR BLD AUTO: 31.8 % (ref 34.8–46.1)
HCT VFR BLD AUTO: 34.7 % (ref 34.8–46.1)
HGB BLD-MCNC: 10.3 G/DL (ref 11.5–15.4)
HGB BLD-MCNC: 8.7 G/DL (ref 11.5–15.4)
HGB BLD-MCNC: 9.6 G/DL (ref 11.5–15.4)
HGB BLD-MCNC: 9.7 G/DL (ref 11.5–15.4)
MRSA NOSE QL CULT: NORMAL
PROCALCITONIN SERPL-MCNC: 17.38 NG/ML

## 2025-03-12 PROCEDURE — 85018 HEMOGLOBIN: CPT | Performed by: SURGERY

## 2025-03-12 PROCEDURE — 99232 SBSQ HOSP IP/OBS MODERATE 35: CPT | Performed by: INTERNAL MEDICINE

## 2025-03-12 PROCEDURE — 85014 HEMATOCRIT: CPT | Performed by: SURGERY

## 2025-03-12 PROCEDURE — 82948 REAGENT STRIP/BLOOD GLUCOSE: CPT

## 2025-03-12 PROCEDURE — 84145 PROCALCITONIN (PCT): CPT

## 2025-03-12 PROCEDURE — 99232 SBSQ HOSP IP/OBS MODERATE 35: CPT | Performed by: COLON & RECTAL SURGERY

## 2025-03-12 PROCEDURE — 99232 SBSQ HOSP IP/OBS MODERATE 35: CPT

## 2025-03-12 RX ORDER — INSULIN LISPRO 100 [IU]/ML
1-5 INJECTION, SOLUTION INTRAVENOUS; SUBCUTANEOUS
Status: DISCONTINUED | OUTPATIENT
Start: 2025-03-12 | End: 2025-03-14 | Stop reason: HOSPADM

## 2025-03-12 RX ADMIN — LOSARTAN POTASSIUM 50 MG: 50 TABLET, FILM COATED ORAL at 08:49

## 2025-03-12 RX ADMIN — MIRTAZAPINE 15 MG: 15 TABLET, FILM COATED ORAL at 21:57

## 2025-03-12 RX ADMIN — METRONIDAZOLE 500 MG: 500 INJECTION, SOLUTION INTRAVENOUS at 18:47

## 2025-03-12 RX ADMIN — AMLODIPINE BESYLATE 5 MG: 5 TABLET ORAL at 18:00

## 2025-03-12 RX ADMIN — ATORVASTATIN CALCIUM 40 MG: 40 TABLET, FILM COATED ORAL at 08:49

## 2025-03-12 RX ADMIN — SODIUM CHLORIDE, SODIUM GLUCONATE, SODIUM ACETATE, POTASSIUM CHLORIDE, MAGNESIUM CHLORIDE, SODIUM PHOSPHATE, DIBASIC, AND POTASSIUM PHOSPHATE 75 ML/HR: .53; .5; .37; .037; .03; .012; .00082 INJECTION, SOLUTION INTRAVENOUS at 21:58

## 2025-03-12 RX ADMIN — EZETIMIBE 10 MG: 10 TABLET ORAL at 08:49

## 2025-03-12 RX ADMIN — CEFTRIAXONE SODIUM 1000 MG: 10 INJECTION, POWDER, FOR SOLUTION INTRAVENOUS at 11:40

## 2025-03-12 RX ADMIN — METRONIDAZOLE 500 MG: 500 INJECTION, SOLUTION INTRAVENOUS at 11:06

## 2025-03-12 RX ADMIN — AMLODIPINE BESYLATE 5 MG: 5 TABLET ORAL at 08:49

## 2025-03-12 RX ADMIN — SODIUM CHLORIDE, SODIUM GLUCONATE, SODIUM ACETATE, POTASSIUM CHLORIDE, MAGNESIUM CHLORIDE, SODIUM PHOSPHATE, DIBASIC, AND POTASSIUM PHOSPHATE 75 ML/HR: .53; .5; .37; .037; .03; .012; .00082 INJECTION, SOLUTION INTRAVENOUS at 08:38

## 2025-03-12 RX ADMIN — METRONIDAZOLE 500 MG: 500 INJECTION, SOLUTION INTRAVENOUS at 02:00

## 2025-03-12 NOTE — ASSESSMENT & PLAN NOTE
-Top on the differential would include ischemic colitis versus infectious versus diverticular bleed.  Less likely inflammatory bowel disease or neoplasm  - follow hemoglobin and transfuse as needed  -Hemoglobin currently 8.9 up to 9.6 and now down to 8.7.

## 2025-03-12 NOTE — ASSESSMENT & PLAN NOTE
85yo F with hx of dementia brought in by nursing facility with hematochezia.  Ct scan significant for bowel thickening from descending colon to rectum c/w proctocolitis.    Hemoglobin 8.7 from 9.6    Plan  Follow-up GI, recs appreciated  Okay for diet from surgical perspective  Follow-up stool studies

## 2025-03-12 NOTE — PLAN OF CARE
Problem: Prexisting or High Potential for Compromised Skin Integrity  Goal: Skin integrity is maintained or improved  Description: INTERVENTIONS:  - Identify patients at risk for skin breakdown  - Assess and monitor skin integrity  - Assess and monitor nutrition and hydration status  - Monitor labs   - Assess for incontinence   - Turn and reposition patient  - Assist with mobility/ambulation  - Relieve pressure over bony prominences  - Avoid friction and shearing  - Provide appropriate hygiene as needed including keeping skin clean and dry  - Evaluate need for skin moisturizer/barrier cream  - Collaborate with interdisciplinary team   - Patient/family teaching  - Consider wound care consult   Outcome: Progressing     Problem: Potential for Falls  Goal: Patient will remain free of falls  Description: INTERVENTIONS:  - Educate patient/family on patient safety including physical limitations  - Instruct patient to call for assistance with activity   - Consult OT/PT to assist with strengthening/mobility   - Keep Call bell within reach  - Keep bed low and locked with side rails adjusted as appropriate  - Keep care items and personal belongings within reach  - Initiate and maintain comfort rounds  - Make Fall Risk Sign visible to staff  - Offer Toileting every  Hours, in advance of need  - Initiate/Maintain alarm  - Obtain necessary fall risk management equipment:   - Apply yellow socks and bracelet for high fall risk patients  - Consider moving patient to room near nurses station  Outcome: Progressing     Problem: PAIN - ADULT  Goal: Verbalizes/displays adequate comfort level or baseline comfort level  Description: Interventions:  - Encourage patient to monitor pain and request assistance  - Assess pain using appropriate pain scale  - Administer analgesics based on type and severity of pain and evaluate response  - Implement non-pharmacological measures as appropriate and evaluate response  - Consider cultural and  social influences on pain and pain management  - Notify physician/advanced practitioner if interventions unsuccessful or patient reports new pain  Outcome: Progressing     Problem: INFECTION - ADULT  Goal: Absence or prevention of progression during hospitalization  Description: INTERVENTIONS:  - Assess and monitor for signs and symptoms of infection  - Monitor lab/diagnostic results  - Monitor all insertion sites, i.e. indwelling lines, tubes, and drains  - Monitor endotracheal if appropriate and nasal secretions for changes in amount and color  - [place] appropriate cooling/warming therapies per order  - Administer medications as ordered  - Instruct and encourage patient and family to use good hand hygiene technique  - Identify and instruct in appropriate isolation precautions for identified infection/condition  Outcome: Progressing  Goal: Absence of fever/infection during neutropenic period  Description: INTERVENTIONS:  - Monitor WBC    Outcome: Progressing     Problem: SAFETY ADULT  Goal: Patient will remain free of falls  Description: INTERVENTIONS:  - Educate patient/family on patient safety including physical limitations  - Instruct patient to call for assistance with activity   - Consult OT/PT to assist with strengthening/mobility   - Keep Call bell within reach  - Keep bed low and locked with side rails adjusted as appropriate  - Keep care items and personal belongings within reach  - Initiate and maintain comfort rounds  - Make Fall Risk Sign visible to staff  - Offer Toileting every  Hours, in advance of need  - Initiate/Maintain alarm  - Obtain necessary fall risk management equipment:   - Apply yellow socks and bracelet for high fall risk patients  - Consider moving patient to room near nurses station  Outcome: Progressing  Goal: Maintain or return to baseline ADL function  Description: INTERVENTIONS:  -  Assess patient's ability to carry out ADLs; assess patient's baseline for ADL function and  identify physical deficits which impact ability to perform ADLs (bathing, care of mouth/teeth, toileting, grooming, dressing, etc.)  - Assess/evaluate cause of self-care deficits   - Assess range of motion  - Assess patient's mobility; develop plan if impaired  - Assess patient's need for assistive devices and provide as appropriate  - Encourage maximum independence but intervene and supervise when necessary  - Involve family in performance of ADLs  - Assess for home care needs following discharge   - Consider OT consult to assist with ADL evaluation and planning for discharge  - Provide patient education as appropriate  Outcome: Progressing  Goal: Maintains/Returns to pre admission functional level  Description: INTERVENTIONS:  - Perform AM-PAC 6 Click Basic Mobility/ Daily Activity assessment daily.  - Set and communicate daily mobility goal to care team and patient/family/caregiver.   - Collaborate with rehabilitation services on mobility goals if consulted  - Perform Range of Motion  times a day.  - Reposition patient every  hours.  - Dangle patient  times a day  - Stand patient  times a day  - Ambulate patient  times a day  - Out of bed to chair  times a day   - Out of bed for meals times a day  - Out of bed for toileting  - Record patient progress and toleration of activity level   Outcome: Progressing     Problem: DISCHARGE PLANNING  Goal: Discharge to home or other facility with appropriate resources  Description: INTERVENTIONS:  - Identify barriers to discharge w/patient and caregiver  - Arrange for needed discharge resources and transportation as appropriate  - Identify discharge learning needs (meds, wound care, etc.)  - Arrange for interpretive services to assist at discharge as needed  - Refer to Case Management Department for coordinating discharge planning if the patient needs post-hospital services based on physician/advanced practitioner order or complex needs related to functional status, cognitive  ability, or social support system  Outcome: Progressing     Problem: Knowledge Deficit  Goal: Patient/family/caregiver demonstrates understanding of disease process, treatment plan, medications, and discharge instructions  Description: Complete learning assessment and assess knowledge base.  Interventions:  - Provide teaching at level of understanding  - Provide teaching via preferred learning methods  Outcome: Progressing

## 2025-03-12 NOTE — ASSESSMENT & PLAN NOTE
Lactic 2.3-> 1.9  Suspect in the setting of infection   Improved after 500 mL fluid bolus.  And start on maintenance fluids

## 2025-03-12 NOTE — Clinical Note
CHART REVIEW ONLY    Proctocolitis  Hematochezia  Acute blood loss anemia  CAD  Benign hypertension  Dyslipidemia  Pancreatic lesion on imaging  Hyperglycemia  Lactic acidosis  Assessment:   Pt is a 84 y.o. female who arrived at [facility] on 3/10/2025 w/ Proctocolitis.  Order placed for PT.      Prior to admission: Pt from [facility] (admit date 8/26/24) Upon evaluation: Pt ***.      Pt's clinical presentation is currently {MSLstable:93028} given the functional mobility deficits above, especially (but not limited to) {MSLbodysystem:79258}, and combined with medical complications including abnormal renal lab values and abnormal H&H.  Pt IS *** at his/her mobility baseline.  *** is at risk for falls based on {mslfallrisks:49416}.       During this admission, pt would *** benefit from continued skilled inpatient PT in the acute care setting in order to address deficits as defined above to maximize function and mobility.      Recommendations:    From a PT standpoint, recommend next several sessions focus on ***.      Goals: Pt will: Perform {mslbed mobility:85494} bed mobility tasks with {MSLmobilitygoal:09260} to prepare for transfers and reposition in bed. Perform transfers with {MSLmobilitygoal:44038} to {MSLmobilitygoalreasons:69335}. Perform ambulation with *** for *** with {MSLmobilitygoal:80194} to {MSLmobilitygoalreasons:72753}. Perform *** stairs *** railing +/- DME and w/{MSLmobilitygoal:86222} to {MSLmobilitygoalreasons:12007}. ***.    Personal factors affecting pt at time of IE include: {MSLpersonalfactors:40993}.

## 2025-03-12 NOTE — ASSESSMENT & PLAN NOTE
-CT shows left-sided colitis consistent with possible ischemic, also diverticulosis, no high-volume GI bleeding  -Currently on ceftriaxone and metronidazole  -Stool enteric pathogen and fecal calprotectin and C. difficile not yet collected  -Pending patient's course can consider colonoscopy, but not planned at this time  -Will continue to observe and follow-up on stool studies

## 2025-03-12 NOTE — PHYSICAL THERAPY NOTE
PHYSICAL THERAPY NOTE    Patient Name: Jo Jason  Today's Date: 3/12/2025     03/12/25 1036   Note Type   Note type Cancelled Session   Cancel Reasons Other   Additional Comments Consult reviewed and Chart reviewed for 83 y/o female who appears to be a LTC resident @ [facility] since 8/2024. Unfortunately, no information readily available re: Pt's PTA LOF including in media. Reached out to case management who plans to send message to facility. Will follow    Elizabeth Salazar PT

## 2025-03-12 NOTE — ASSESSMENT & PLAN NOTE
Blood sugar noted to be 320 on BMP  This recent A1c 6.7  Initiated on sliding scale insulin.  Follow up outpatient regarding initiation on chronic medication.

## 2025-03-12 NOTE — ASSESSMENT & PLAN NOTE
Lab Results   Component Value Date    EGFR 41 03/11/2025    EGFR 29 03/10/2025    EGFR 26 07/15/2024    CREATININE 1.20 03/11/2025    CREATININE 1.61 (H) 03/10/2025    CREATININE 1.76 (H) 07/15/2024   Creatinine stable  Continue PTA antihypertensive regimen

## 2025-03-12 NOTE — PLAN OF CARE
----- Message from Shanta Mcdonald [phone]     Problem: Prexisting or High Potential for Compromised Skin Integrity  Goal: Skin integrity is maintained or improved  Description: INTERVENTIONS:  - Identify patients at risk for skin breakdown  - Assess and monitor skin integrity  - Assess and monitor nutrition and hydration status  - Monitor labs   - Assess for incontinence   - Turn and reposition patient  - Assist with mobility/ambulation  - Relieve pressure over bony prominences  - Avoid friction and shearing  - Provide appropriate hygiene as needed including keeping skin clean and dry  - Evaluate need for skin moisturizer/barrier cream  - Collaborate with interdisciplinary team   - Patient/family teaching  - Consider wound care consult   Outcome: Progressing     Problem: Potential for Falls  Goal: Patient will remain free of falls  Description: INTERVENTIONS:  - Educate patient/family on patient safety including physical limitations  - Instruct patient to call for assistance with activity   - Consult OT/PT to assist with strengthening/mobility   - Keep Call bell within reach  - Keep bed low and locked with side rails adjusted as appropriate  - Keep care items and personal belongings within reach  - Initiate and maintain comfort rounds  - Make Fall Risk Sign visible to staff  - Apply yellow socks and bracelet for high fall risk patients  - Consider moving patient to room near nurses station  Outcome: Progressing     Problem: PAIN - ADULT  Goal: Verbalizes/displays adequate comfort level or baseline comfort level  Description: Interventions:  - Encourage patient to monitor pain and request assistance  - Assess pain using appropriate pain scale  - Administer analgesics based on type and severity of pain and evaluate response  - Implement non-pharmacological measures as appropriate and evaluate response  - Consider cultural and social influences on pain and pain management  - Notify physician/advanced practitioner if interventions unsuccessful or patient reports  new pain  Outcome: Progressing     Problem: INFECTION - ADULT  Goal: Absence or prevention of progression during hospitalization  Description: INTERVENTIONS:  - Assess and monitor for signs and symptoms of infection  - Monitor lab/diagnostic results  - Monitor all insertion sites, i.e. indwelling lines, tubes, and drains  - Monitor endotracheal if appropriate and nasal secretions for changes in amount and color  - [place] appropriate cooling/warming therapies per order  - Administer medications as ordered  - Instruct and encourage patient and family to use good hand hygiene technique  - Identify and instruct in appropriate isolation precautions for identified infection/condition  Outcome: Progressing  Goal: Absence of fever/infection during neutropenic period  Description: INTERVENTIONS:  - Monitor WBC    Outcome: Progressing     Problem: SAFETY ADULT  Goal: Patient will remain free of falls  Description: INTERVENTIONS:  - Educate patient/family on patient safety including physical limitations  - Instruct patient to call for assistance with activity   - Consult OT/PT to assist with strengthening/mobility   - Keep Call bell within reach  - Keep bed low and locked with side rails adjusted as appropriate  - Keep care items and personal belongings within reach  - Initiate and maintain comfort rounds  - Make Fall Risk Sign visible to staff  - Apply yellow socks and bracelet for high fall risk patients  - Consider moving patient to room near nurses station  Outcome: Progressing  Goal: Maintain or return to baseline ADL function  Description: INTERVENTIONS:  -  Assess patient's ability to carry out ADLs; assess patient's baseline for ADL function and identify physical deficits which impact ability to perform ADLs (bathing, care of mouth/teeth, toileting, grooming, dressing, etc.)  - Assess/evaluate cause of self-care deficits   - Assess range of motion  - Assess patient's mobility; develop plan if impaired  - Assess  patient's need for assistive devices and provide as appropriate  - Encourage maximum independence but intervene and supervise when necessary  - Involve family in performance of ADLs  - Assess for home care needs following discharge   - Consider OT consult to assist with ADL evaluation and planning for discharge  - Provide patient education as appropriate  Outcome: Progressing  Goal: Maintains/Returns to pre admission functional level  Description: INTERVENTIONS:  - Perform AM-PAC 6 Click Basic Mobility/ Daily Activity assessment daily.  - Set and communicate daily mobility goal to care team and patient/family/caregiver.   - Collaborate with rehabilitation services on mobility goals if consulted  - Out of bed for toileting  - Record patient progress and toleration of activity level   Outcome: Progressing     Problem: DISCHARGE PLANNING  Goal: Discharge to home or other facility with appropriate resources  Description: INTERVENTIONS:  - Identify barriers to discharge w/patient and caregiver  - Arrange for needed discharge resources and transportation as appropriate  - Identify discharge learning needs (meds, wound care, etc.)  - Arrange for interpretive services to assist at discharge as needed  - Refer to Case Management Department for coordinating discharge planning if the patient needs post-hospital services based on physician/advanced practitioner order or complex needs related to functional status, cognitive ability, or social support system  Outcome: Progressing     Problem: Knowledge Deficit  Goal: Patient/family/caregiver demonstrates understanding of disease process, treatment plan, medications, and discharge instructions  Description: Complete learning assessment and assess knowledge base.  Interventions:  - Provide teaching at level of understanding  - Provide teaching via preferred learning methods  Outcome: Progressing

## 2025-03-12 NOTE — UTILIZATION REVIEW
Initial Clinical Review    Admission: Date/Time/Statement:   Admission Orders (From admission, onward)       Ordered        03/11/25 0409  INPATIENT ADMISSION  Once                          Orders Placed This Encounter   Procedures    INPATIENT ADMISSION     Standing Status:   Standing     Number of Occurrences:   1     Level of Care:   Med Surg [16]     Estimated length of stay:   More than 2 Midnights     Certification:   I certify that inpatient services are medically necessary for this patient for a duration of greater than two midnights. See H&P and MD Progress Notes for additional information about the patient's course of treatment.     ED Arrival Information       Expected   -    Arrival   3/10/2025 22:28    Acuity   Urgent              Means of arrival   Ambulance    Escorted by   [facility]    Service   Hospitalist    Admission type   Emergency              Arrival complaint   Bloody Stool             Chief Complaint   Patient presents with    Rectal Bleeding     Patient arrives via EMS from [facility] after having a large bowel movement with blood and clots around 2145. Patient feels nauseous on arrival.         Initial Presentation: 84 y.o. female with hx CAD, CKD stage V, dementia- alert, oriented to self as baseline , HTN, tremor, recent folliculitis-( completed course of Augmentin)  who presents to ED from [facility] facility 3/10  via EMS with hematochezia . Per facility, patient had 1 large episode of bloody diarrhea with subsequent nonbloody bilious vomiting . Pt nauseated ion arrival .  On exam, pt is alert to self and disoriented to time, place, and situation. GCS 14 . Dry mucous membranes , pale skin . Abdomen soft , non distended , minor suprapubic and midepigastric    Labs : Hgb 12.5-->11.2, WBC 11.45, glucose 320 , Lactic acid 2.3-->2.5 , INR 2.81 ,  not on anticoagulation , is on ASA CT A/P consistent w/ proctocolitis, shows subcentimeter pancreatic cystic lesion (pts sister feels pt would not want  this lesion worked up. ) .  Pt given IV PPI,  IV antiemetic, IV abx , IVF, Aqua mephyton and K Centra in EDPt admitted as Inpatient 3/11 with proctocolitis . Hematochezia -suspect in the setting of proctocolitis versus ischemic colitis. . Plan- IV abx- Rocephin and Flagyl. Obtain stool studies. GI and general sx consults . F/U blood cx . Trend hgb, transfuse hgb <7 . Give 500 ml IVF bolus, trend lactic acid q2h . Start IVF .    Anticipated Length of Stay/Certification Statement:  Patient will be admitted on an inpatient basis with an anticipated length of stay of greater than 2 midnights secondary to proctocolitis, lactic acidosis, hematochezia.       Date: 3/11   Day 2: Proctocolitis , Hematochezia , Acute blood loss anemia   Patient did have blood in her pad this morning. Was mixed with stool. Hemoglobin trended down to 9.1 from 12 yesterday.  H/H q6h .Lactic acid normalized now. Could be from hypovolemia. Continue antibiotics for now. WBC down to 9.5 today   General sx consult - abdomen exam is almost completely benign but there is some left abdominal tenderness on deep palpation at this time. CT scan shows left-sided bowel wall thickening that is consistent with ischemic colitis which would produce acute bleeding. Recommend nonsurgical management - support, antibiotics, reintroduction of food as soon as deemed reasonable, which could be as early as tomorrow. Defer to GI who has been consulted.   GI consult - Rectal bleeding along with diarrhea secondary to left side proctocolitis-doubt for any active bleeding. Check stool studies including C. Difficile . Agree with IV abx . Monitor Hgb .            Date: 3/12   Day 3: Has surpassed a 2nd midnight with active treatments and services.  Hgb down to 8.7 from 9.6 overnight, from 12.5 on admission . No nausea no vomiting. No bowel movements. Abdomen distended, tympanic, soft, non tender . IVF infusing . Blood cx show GPC in clusters. F/U BCID .    IV abx- Ceftriaxone,  Flagyl.      Per general s, OK for diet - started CL diet this am .  F/U stool studies. .       ED Treatment-Medication Administration from 03/10/2025 2228 to 03/11/2025 1907         Date/Time Order Dose Route Action     03/10/2025 2305 pantoprazole (PROTONIX) injection 40 mg 40 mg Intravenous Given     03/11/2025 0151 ondansetron (ZOFRAN) injection 4 mg 4 mg Intravenous Given     03/11/2025 0155 vancomycin (VANCOCIN) IVPB (premix in dextrose) 1,000 mg 200 mL 1,000 mg Intravenous New Bag     03/11/2025 0055 cefepime (MAXIPIME) 1,000 mg in dextrose 5 % 50 mL IVPB 1,000 mg Intravenous New Bag     03/11/2025 0039 sodium chloride 0.9 % bolus 1,000 mL 1,000 mL Intravenous New Bag     03/11/2025 0101 phytonadione (AQUA-MEPHYTON) 10 mg/mL 10 mg in sodium chloride 0.9 % 50 mL IVPB 10 mg Intravenous New Bag     03/11/2025 0146 prothrombin complex concentrate (human) (Kcentra) 1,500 Units 1,500 Units Intravenous Given     03/11/2025 0210 iohexol (OMNIPAQUE) 350 MG/ML injection (MULTI-DOSE) 85 mL 85 mL Intravenous Given     03/11/2025 0324 metroNIDAZOLE (FLAGYL) IVPB (premix) 500 mg 100 mL 500 mg Intravenous New Bag     03/11/2025 0425 sodium chloride 0.9 % bolus 500 mL 500 mL Intravenous New Bag     03/11/2025 0428 multi-electrolyte (Plasmalyte-A/Isolyte-S PH 7.4/Normosol-R) IV bolus 500 mL 500 mL Intravenous New Bag     03/11/2025 0601 multi-electrolyte (Plasmalyte-A/Isolyte-S PH 7.4/Normosol-R) IV solution 75 mL/hr Intravenous New Bag     03/11/2025 0900 aspirin (ECOTRIN LOW STRENGTH) EC tablet 81 mg -- Oral Held Dose     03/11/2025 1141 ceftriaxone (ROCEPHIN) 1 g/50 mL in dextrose IVPB 1,000 mg Intravenous New Bag     03/11/2025 1055 metroNIDAZOLE (FLAGYL) IVPB (premix) 500 mg 100 mL 500 mg Intravenous New Bag            Scheduled Medications:  amLODIPine, 5 mg, Oral, BID  [Held by provider] aspirin, 81 mg, Oral, Daily  atorvastatin, 40 mg, Oral, Daily  cefTRIAXone, 1,000 mg, Intravenous, Q24H  ezetimibe, 10 mg, Oral,  Daily  losartan, 50 mg, Oral, Daily  metroNIDAZOLE, 500 mg, Intravenous, Q8H  mirtazapine, 15 mg, Oral, HS  pantoprazole, 40 mg, Oral, Early Morning      Continuous IV Infusions:  multi-electrolyte, 75 mL/hr, Intravenous, Continuous      PRN Meds:     ED Triage Vitals   Temperature Pulse Respirations Blood Pressure SpO2 Pain Score   03/10/25 2236 03/10/25 2232 03/10/25 2232 03/10/25 2232 03/10/25 2232 03/10/25 2232   (!) 97.4 °F (36.3 °C) 67 18 148/67 100 % No Pain     Weight (last 2 days)       Date/Time Weight    03/10/25 2232 43.5 (95.9)            Vital Signs (last 3 days)       Date/Time Temp Pulse Resp BP MAP (mmHg) SpO2 O2 Device Patient Position - Orthostatic VS [place] Coma Scale Score Pain    03/12/25 07:12:15 97.8 °F (36.6 °C) -- 18 140/54 83 -- -- -- -- --    03/11/25 2226 -- -- -- 116/50 -- -- -- -- -- --    03/11/25 2000 -- -- -- -- -- 97 % None (Room air) -- 14 No Pain    03/11/25 19:11:03 97.9 °F (36.6 °C) 65 -- 123/49 74 97 % -- -- -- --    03/11/25 1848 97.6 °F (36.4 °C) 67 20 114/58 83 97 % None (Room air) Lying -- --    03/11/25 1500 -- 68 -- 102/52 74 95 % -- -- -- --    03/11/25 1430 -- 67 -- 112/55 79 97 % -- -- -- --    03/11/25 1300 -- 66 -- 106/51 73 99 % -- -- -- --    03/11/25 1245 -- 64 -- 121/55 79 96 % -- -- -- --    03/11/25 1130 -- 62 -- 111/51 74 97 % -- -- -- --    03/11/25 1100 -- 64 -- 111/51 73 97 % -- -- -- --    03/11/25 1015 -- 62 -- 101/52 74 97 % -- -- -- --    03/11/25 0800 -- 68 -- 129/57 82 99 % -- -- -- --    03/11/25 0700 -- 64 -- 116/53 76 97 % -- -- -- --    03/11/25 0615 -- 72 -- -- -- 98 % -- -- -- --    03/11/25 0530 -- 67 -- 130/59 85 98 % -- -- -- --    03/11/25 0455 -- -- -- -- -- -- -- -- 14 --    03/11/25 0430 -- 69 -- 150/70 100 97 % -- -- -- --    03/11/25 0415 -- 72 -- -- -- 97 % -- -- -- --    03/11/25 0330 -- 73 -- 150/70 100 97 % -- -- -- --    03/11/25 0300 -- 74 -- 149/66 101 97 % -- -- -- --    03/11/25 0245 -- 87 16 167/78 -- 96 % None (Room air)  Sitting -- --    03/11/25 0030 -- 63 -- 148/66 95 96 % -- -- -- --    03/11/25 0000 -- 67 -- 137/64 92 97 % -- -- -- --    03/10/25 2304 -- -- -- -- -- -- -- -- 14 --    03/10/25 2300 -- 60 -- 126/60 87 98 % -- -- -- --    03/10/25 2236 97.4 °F (36.3 °C) -- -- -- -- -- -- -- -- --    03/10/25 2234 -- -- -- -- -- 100 % -- -- -- --    03/10/25 2232 -- 67 18 148/67 -- 100 % None (Room air) Sitting -- No Pain              Pertinent Labs/Diagnostic Test Results:   Radiology:  CT chest w ct abdomen pelvis w wo contrast   Final Interpretation by Rob Higgins DO (03/11 0252)      Marked wall thickening and inflammation of the descending colon, sigmoid colon, and rectum, consistent with proctocolitis. Correlate for ischemia. No high volume GI bleed identified.         Subcentimeter pancreatic cystic lesion.-Management recommendation: Followup every 2 years. Endpoint determined by clinician. Consider patient's age and clinical status to assess need for further follow-up. Preferred imaging modality: abdomen MRI and MRCP    with and without IV contrast, or triple phase abdomen CT with IV contrast, or abdomen MRI and MRCP without IV contrast.      Management and follow up recommendations for cystic pancreatic lesions are based on Institutional consensus and international evidence-based Kyoto guidelines for the management of intraductal papillary mucinous neoplasm of the pancreas. Pancreatology 24    (2024) 255-270.      The study was marked in EPIC for immediate notification.      Workstation performed: [id number]         XR chest 1 view portable   ED Interpretation by Tarik Bailey DO (03/11 0027)   No acute cardiopulmonary changes      Final Interpretation by Juarez Anguiano DO (03/11 1115)      No acute cardiopulmonary disease.            Workstation performed: [id number]           Cardiology:  ECG 12 lead   Final Result by Rafa Ojeda MD (03/11 7805)   Sinus rhythm with 1st degree A-V block   Nonspecific ST and T wave  abnormality   Abnormal ECG   When compared with ECG of 26-Jan-2008 04:36,   MANUAL COMPARISON REQUIRED PREVIOUS ECG IS INCOMPATIBLE   Confirmed by Rafa Ojeda ([id number]) on 3/11/2025 3:49:32 PM        GI:  No orders to display           Results from last 7 days   Lab Units 03/12/25  0521 03/12/25  0011 03/11/25  1749 03/11/25  1333 03/11/25  0606 03/11/25  0332 03/10/25  2301   WBC Thousand/uL  --   --   --   --  9.52  --  11.45*   HEMOGLOBIN g/dL 8.7* 9.6* 8.9* 9.1* 10.3*   < > 12.5   HEMATOCRIT % 26.8* 31.8* 27.5* 27.7* 31.7*   < > 37.9   PLATELETS Thousands/uL  --   --   --   --  148*  --  255   BANDS PCT %  --   --   --   --  8  --  10*    < > = values in this interval not displayed.         Results from last 7 days   Lab Units 03/11/25  0606 03/10/25  2301   SODIUM mmol/L 135 134*   POTASSIUM mmol/L 4.1 4.2   CHLORIDE mmol/L 104 99   CO2 mmol/L 21 24   ANION GAP mmol/L 10 11   BUN mg/dL 31* 39*   CREATININE mg/dL 1.20 1.61*   EGFR ml/min/1.73sq m 41 29   CALCIUM mg/dL 8.6 10.3*     Results from last 7 days   Lab Units 03/10/25  2301   AST U/L 24   ALT U/L 17   ALK PHOS U/L 85   TOTAL PROTEIN g/dL 7.3   ALBUMIN g/dL 4.2   TOTAL BILIRUBIN mg/dL 0.67         Results from last 7 days   Lab Units 03/11/25  0606 03/10/25  2301   GLUCOSE RANDOM mg/dL 244* 320*         Results from last 7 days   Lab Units 03/11/25  0332   HEMOGLOBIN A1C % 6.7*   EAG mg/dl 146     Beta- Hydroxybutyrate   Date Value Ref Range Status   03/10/2025 0.11 0.02 - 0.27 mmol/L Final          Results from last 7 days   Lab Units 03/11/25  0043   PH MONTY  7.387   PCO2 MONTY mm Hg 34.3*   PO2 MONTY mm Hg 116.2*   HCO3 MONTY mmol/L 20.2*   BASE EXC MONTY mmol/L -4.1   O2 CONTENT MONYT ml/dL 16.2   O2 HGB, VENOUS % 94.2*                     Results from last 7 days   Lab Units 03/11/25  0606 03/10/25  2301   PROTIME seconds 18.8* 30.2*   INR  1.50* 2.81*   PTT seconds  --  32         Results from last 7 days   Lab Units 03/12/25  0521 03/10/25  2301    PROCALCITONIN ng/ml 17.38* 0.46*     Results from last 7 days   Lab Units 03/11/25  0854 03/11/25  0606 03/11/25  0332 03/11/25  0043   LACTIC ACID mmol/L 1.9 2.1* 2.5* 2.3*                                 Results from last 7 days   Lab Units 03/10/25  2301   LIPASE u/L 150*                 Results from last 7 days   Lab Units 03/10/25  2344   CLARITY UA  Clear   COLOR UA  Light Orange   SPEC GRAV UA  1.016   PH UA  5.5   GLUCOSE UA mg/dl Trace*   KETONES UA mg/dl Negative   BLOOD UA  Negative   PROTEIN UA mg/dl Trace*   NITRITE UA  Negative   BILIRUBIN UA  Negative   UROBILINOGEN UA (BE) mg/dl 2.0*   LEUKOCYTES UA  Negative   WBC UA /hpf 1-2   RBC UA /hpf 1-2   BACTERIA UA /hpf None Seen   EPITHELIAL CELLS WET PREP /hpf Occasional                                 Results from last 7 days   Lab Units 03/11/25  0043   BLOOD CULTURE  No Growth at 24 hrs.   GRAM STAIN RESULT  Gram positive cocci in clusters*                   Past Medical History:   Diagnosis Date    Dyslipidemia     Hypertension     Tremor      Present on Admission:   Coronary artery disease involving native coronary artery of native heart with angina pectoris (HCC)   Benign hypertension with CKD (chronic kidney disease) stage IV (HCC)   Dyslipidemia      Admitting Diagnosis: Hematochezia [K92.1]  Proctocolitis [K52.9]  Anemia [D64.9]  Bloody stool [K92.1]  Bandemia [D72.825]  Elevated random blood glucose level [R73.9]  Age/Sex: 84 y.o. female    Network Utilization Review Department  ATTENTION: Please call with any questions or concerns to [phone] and carefully listen to the prompts so that you are directed to the right person. All voicemails are confidential.   For Discharge needs, contact Care Management DC Support Team at [phone] opt. [phone]  Send all requests for admission clinical reviews, approved or denied determinations and any other requests to dedicated fax number below belonging to the campus where the patient is receiving  treatment. List of dedicated fax numbers for the Facilities:  FACILITY NAME UR FAX NUMBER   ADMISSION DENIALS (Administrative/Medical Necessity) [phone]   DISCHARGE SUPPORT TEAM (NETWORK) [phone]   PARENT CHILD HEALTH (Maternity/NICU/Pediatrics) [phone]   [facility] [phone]   General acute hospital [phone]   [facility] [phone]   [facility] [phone]   [facility] [phone]   [facility] [phone]   [facility] [phone]   [facility] [phone]   [facility] [phone]   Replaced by [facility] [phone]   [facility] [phone]   [facility] [phone]

## 2025-03-12 NOTE — ASSESSMENT & PLAN NOTE
Recent Labs     03/12/25  0011 03/12/25  0521 03/12/25  1236   HGB 9.6* 8.7* 9.7*     In setting of acute GIB.  Hg check q 6h.  Transfuse for <7.

## 2025-03-12 NOTE — PROGRESS NOTES
Progress Note - Gastroenterology   Name: Jo Jason 84 y.o. female I MRN: [id number]  Unit/Bed#: S -01 I Date of Admission: 3/10/2025   Date of Service: 3/12/2025 I Hospital Day: 1    Assessment & Plan  Proctocolitis  -CT shows left-sided colitis consistent with possible ischemic, also diverticulosis, no high-volume GI bleeding  -Currently on ceftriaxone and metronidazole  -Stool enteric pathogen and fecal calprotectin and C. difficile not yet collected  -Pending patient's course can consider colonoscopy, but not planned at this time  -Will continue to observe and follow-up on stool studies  Hematochezia  -Top on the differential would include ischemic colitis versus infectious versus diverticular bleed.  Less likely inflammatory bowel disease or neoplasm  - follow hemoglobin and transfuse as needed  -Hemoglobin currently 8.9 up to 9.6 and now down to 8.7.  Pancreatic lesion  - CT 3/11/25 Subcentimeter pancreatic cystic lesion.-Management recommendation: Followup every 2 years. Endpoint determined by clinician. Consider patient's age and clinical status to assess need for further follow-up. Preferred imaging modality: abdomen MRI and MRCP   with and without IV contrast, or triple phase abdomen CT with IV contrast, or abdomen MRI and MRCP without IV contrast.          Subjective   Hemoglobin currently 8.9 up to 9.6 and now down to 8.7.  Stool studies including enteric pathogen, C. difficile and fecal calprotectin still awaiting collection.  Lactic acidosis has resolved from 2.3 down to 1.9.  Patient had no further bleeding and no nausea vomiting or abdominal pain.    Objective :  Temp:  [97.6 °F (36.4 °C)-97.9 °F (36.6 °C)] 97.8 °F (36.6 °C)  HR:  [62-68] 65  BP: (102-140)/(49-58) 140/54  Resp:  [18-20] 18  SpO2:  [93 %-99 %] 93 %  O2 Device: None (Room air)    Physical Exam  Vitals reviewed.   Constitutional:       General: She is not in acute distress.     Appearance: Normal appearance. She is not  ill-appearing.   HENT:      Head: Normocephalic and atraumatic.   Eyes:      General: No scleral icterus.     Conjunctiva/sclera: Conjunctivae normal.   Cardiovascular:      Rate and Rhythm: Normal rate and regular rhythm.   Pulmonary:      Effort: Pulmonary effort is normal. No respiratory distress.      Breath sounds: Normal breath sounds.   Abdominal:      General: Bowel sounds are normal. There is no distension.      Palpations: Abdomen is soft.      Tenderness: There is no abdominal tenderness. There is no guarding.   Skin:     General: Skin is warm and dry.   Neurological:      Mental Status: She is alert.      Comments: She is oriented to herself place but not situation or time   Psychiatric:         Mood and Affect: Mood normal.         Behavior: Behavior normal.           Lab Results: I have reviewed the following results:CBC/BMP:   .     03/12/25  0521   HGB 8.7*   HCT 26.8*    , LFTs: No new results in last 24 hours. , PTT/INR:No new results in last 24 hours.     Imaging Results Review: I reviewed radiology reports from this admission including: CT abdomen/pelvis.  Other Study Results Review: No additional pertinent studies reviewed.        Daniel Shepherd PA-C  [facility] - Gastroentrology

## 2025-03-12 NOTE — ASSESSMENT & PLAN NOTE
Noted to have episodes of bloody diarrhea nursing facility  Noted to have elevated INR 2.81 not on anticoagulation  Given reversal in the ED  Suspect in the setting of proctocolitis versus ischemic colitis.  Surgery/GI following  Trend hemoglobin every 6 hours  Transfuse for hemoglobin less than 7

## 2025-03-12 NOTE — ASSESSMENT & PLAN NOTE
Patient presents from nursing home with vomiting and multiple episodes of bloody diarrhea.  On ASA.  CT abdomen: Marked wall thickening and inflammation of the descending colon, sigmoid colon, and rectum consistent with proctocolitis.  Correlate for ischemia.  No evidence of high-volume GI bleed.  In ED patient was started on IV ceftriaxone and Flagyl, continue IV Rocephin/Flagyl  Obtain stool enteric panel, C. difficile, and fecal calprotectin, not yet collected.  General surgery consult recommending nonsurgical management as patient poor surgical candidate.  GI consult, can consider colonoscopy in the future however not planned at this time.  Will continue with observation. Would require GOC discussion with sister if any further intervention is planned  Colorectal surgery consulted recommending advancing diet as tolerated.  Continue IV fluids  1 out of 2 blood cultures growing Staph epidermidis contamination.  She is on IV antibiotics anyway.  Initiate on clear liquid diet 3/12.   Dressing: bandage

## 2025-03-12 NOTE — PROGRESS NOTES
Progress Note - Hospitalist   Name: Jo Jason 84 y.o. female I MRN: [id number]  Unit/Bed#: S -01 I Date of Admission: 3/10/2025   Date of Service: 3/12/2025 I Hospital Day: 1    Assessment & Plan  Proctocolitis  Patient presents from nursing home with vomiting and multiple episodes of bloody diarrhea.  On ASA.  CT abdomen: Marked wall thickening and inflammation of the descending colon, sigmoid colon, and rectum consistent with proctocolitis.  Correlate for ischemia.  No evidence of high-volume GI bleed.  In ED patient was started on IV ceftriaxone and Flagyl, continue IV Rocephin/Flagyl  Obtain stool enteric panel, C. difficile, and fecal calprotectin, not yet collected.  General surgery consult recommending nonsurgical management as patient poor surgical candidate.  GI consult, can consider colonoscopy in the future however not planned at this time.  Will continue with observation. Would require GOC discussion with sister if any further intervention is planned  Colorectal surgery consulted recommending advancing diet as tolerated.  Continue IV fluids  1 out of 2 blood cultures growing Staph epidermidis contamination.  She is on IV antibiotics anyway.  Initiate on clear liquid diet 3/12.  Hematochezia  Noted to have episodes of bloody diarrhea nursing facility  Noted to have elevated INR 2.81 not on anticoagulation  Given reversal in the ED  Suspect in the setting of proctocolitis versus ischemic colitis.  Surgery/GI following  Trend hemoglobin every 6 hours  Transfuse for hemoglobin less than 7  Acute blood loss anemia  Recent Labs     03/12/25  0011 03/12/25  0521 03/12/25  1236   HGB 9.6* 8.7* 9.7*     In setting of acute GIB.  Hg check q 6h.  Transfuse for <7.  Hyperglycemia  Blood sugar noted to be 320 on BMP  This recent A1c 6.7  Initiated on sliding scale insulin.  Follow up outpatient regarding initiation on chronic medication.  Coronary artery disease involving native coronary artery of native  heart with angina pectoris (HCC)  Hold ASA in the setting of GI bleeding  Resume when able  Continue PTA Lipitor  Benign hypertension with CKD (chronic kidney disease) stage IV (HCC)  Lab Results   Component Value Date    EGFR 41 03/11/2025    EGFR 29 03/10/2025    EGFR 26 07/15/2024    CREATININE 1.20 03/11/2025    CREATININE 1.61 (H) 03/10/2025    CREATININE 1.76 (H) 07/15/2024   Creatinine stable  Continue PTA antihypertensive regimen  Dyslipidemia  Continue PTA Lipitor and Zetia.  Pancreatic lesion  CT showing subcentimeter pancreatic cystic lesion  Recommend follow-up every 2 years.  Consider patient's age and clinical status to assess need for further follow-up.  Preferred imaging modality is abdomen MRI and MRCP with and without IV contrast ordered triple phase abdomen CT with IV contrast or abdomen MRI and MRCP without IV contrast.  Discussed finding with patients sister Mariposa who feels patient would not want this further worked up.  Lactic acidosis  Lactic 2.3-> 1.9  Suspect in the setting of infection   Improved after 500 mL fluid bolus.  And start on maintenance fluids    VTE Pharmacologic Prophylaxis: VTE Score: 4 Moderate Risk (Score 3-4) - Pharmacological DVT Prophylaxis Contraindicated. Sequential Compression Devices Ordered.    Mobility:   Basic Mobility Inpatient Raw Score: 6  JH-HLM Goal: 2: Bed activities/Dependent transfer  JH-HLM Achieved: 2: Bed activities/Dependent transfer  JH-HLM Goal achieved. Continue to encourage appropriate mobility.    Patient Centered Rounds: I performed bedside rounds with nursing staff today.   Discussions with Specialists or Other Care Team Provider: CM, general surgery/CRC/GI notes reviewed    Education and Discussions with Family / Patient: Updated  (sister) via phone.    Current Length of Stay: 1 day(s)  Current Patient Status: Inpatient   Certification Statement: The patient will continue to require additional inpatient hospital stay due to pending  clinical improvement  Discharge Plan: Anticipate discharge in 48 hrs to prior facility    Code Status: Level 3 - DNAR and DNI    Subjective   Pleasantly confused, offering no complaints at this time.    Objective :  Temp:  [97.6 °F (36.4 °C)-97.9 °F (36.6 °C)] 97.8 °F (36.6 °C)  HR:  [64-68] 65  BP: (102-140)/(49-58) 140/54  Resp:  [18-20] 18  SpO2:  [93 %-99 %] 93 %  O2 Device: None (Room air)    Body mass index is 18.73 kg/m².     Input and Output Summary (last 24 hours):     Intake/Output Summary (Last 24 hours) at 3/12/2025 1212  Last data filed at 3/12/2025 0759  Gross per 24 hour   Intake --   Output 250 ml   Net -250 ml       Physical Exam  Vitals reviewed.   Constitutional:       General: She is not in acute distress.     Appearance: Normal appearance. She is cachectic. She is not toxic-appearing.   HENT:      Head: Normocephalic and atraumatic.   Eyes:      General: No scleral icterus.  Cardiovascular:      Rate and Rhythm: Normal rate and regular rhythm.      Heart sounds: Normal heart sounds. No murmur heard.  Pulmonary:      Effort: Pulmonary effort is normal. No respiratory distress.      Breath sounds: Normal breath sounds. No stridor.   Abdominal:      General: Bowel sounds are normal. There is no distension.      Palpations: Abdomen is soft.      Tenderness: There is no abdominal tenderness.   Musculoskeletal:         General: Normal range of motion.      Cervical back: Normal range of motion.      Right lower leg: No edema.      Left lower leg: No edema.   Skin:     Coloration: Skin is not jaundiced or pale.   Neurological:      General: No focal deficit present.      Mental Status: She is alert and oriented to person, place, and time. Mental status is at baseline.               Lab Results: I have reviewed the following results:   Results from last 7 days   Lab Units 03/12/25  0521 03/11/25  1333 03/11/25  0606   WBC Thousand/uL  --   --  9.52   HEMOGLOBIN g/dL 8.7*   < > 10.3*   HEMATOCRIT % 26.8*    < > 31.7*   PLATELETS Thousands/uL  --   --  148*   BANDS PCT %  --   --  8   LYMPHO PCT %  --   --  2*   MONO PCT %  --   --  4   EOS PCT %  --   --  0    < > = values in this interval not displayed.     Results from last 7 days   Lab Units 03/11/25  0606 03/10/25  2301   SODIUM mmol/L 135 134*   POTASSIUM mmol/L 4.1 4.2   CHLORIDE mmol/L 104 99   CO2 mmol/L 21 24   BUN mg/dL 31* 39*   CREATININE mg/dL 1.20 1.61*   ANION GAP mmol/L 10 11   CALCIUM mg/dL 8.6 10.3*   ALBUMIN g/dL  --  4.2   TOTAL BILIRUBIN mg/dL  --  0.67   ALK PHOS U/L  --  85   ALT U/L  --  17   AST U/L  --  24   GLUCOSE RANDOM mg/dL 244* 320*     Results from last 7 days   Lab Units 03/11/25  0606   INR  1.50*         Results from last 7 days   Lab Units 03/11/25  0332   HEMOGLOBIN A1C % 6.7*     Results from last 7 days   Lab Units 03/12/25  0521 03/11/25  0854 03/11/25  0606 03/11/25  0332 03/11/25  0043 03/10/25  2301   LACTIC ACID mmol/L  --  1.9 2.1* 2.5* 2.3*  --    PROCALCITONIN ng/ml 17.38*  --   --   --   --  0.46*       Recent Cultures (last 7 days):   Results from last 7 days   Lab Units 03/11/25  0043 03/10/25  2344   BLOOD CULTURE  Staphylococcus epidermidis*  No Growth at 24 hrs.  --    GRAM STAIN RESULT  Gram positive cocci in clusters*  --    URINE CULTURE   --  No Growth <1000 cfu/mL       Imaging Results Review: I reviewed radiology reports from this admission including: CT chest and CT abdomen/pelvis.  Other Study Results Review: EKG was reviewed.  Pathology reports reviewed.    Last 24 Hours Medication List:     Current Facility-Administered Medications:     amLODIPine (NORVASC) tablet 5 mg, BID    [Held by provider] aspirin (ECOTRIN LOW STRENGTH) EC tablet 81 mg, Daily    atorvastatin (LIPITOR) tablet 40 mg, Daily    ceftriaxone (ROCEPHIN) 1 g/50 mL in dextrose IVPB, Q24H, Last Rate: 1,000 mg (03/12/25 1140)    ezetimibe (ZETIA) tablet 10 mg, Daily    insulin lispro (HumALOG/ADMELOG) 100 units/mL subcutaneous injection 1-5  Units, TID AC **AND** Fingerstick Glucose (POCT), TID AC    losartan (COZAAR) tablet 50 mg, Daily    metroNIDAZOLE (FLAGYL) IVPB (premix) 500 mg 100 mL, Q8H, Last Rate: 500 mg (03/12/25 1106)    mirtazapine (REMERON) tablet 15 mg, HS    multi-electrolyte (Plasmalyte-A/Isolyte-S PH 7.4/Normosol-R) IV solution, Continuous, Last Rate: 75 mL/hr (03/12/25 0838)    pantoprazole (PROTONIX) EC tablet 40 mg, Early Morning    Administrative Statements   Today, Patient Was Seen By: Cristin Kirby PA-C    **Please Note: This note may have been constructed using a voice recognition system.**

## 2025-03-12 NOTE — PROGRESS NOTES
Progress Note - Surgery-General   Name: Jo Jason 84 y.o. female I MRN: [id number]  Unit/Bed#: S -01 I Date of Admission: 3/10/2025   Date of Service: 3/12/2025 I Hospital Day: 1    Assessment & Plan  Proctocolitis  85yo F with hx of dementia brought in by nursing facility with hematochezia.  Ct scan significant for bowel thickening from descending colon to rectum c/w proctocolitis.    Hemoglobin 8.7 from 9.6    Plan  Follow-up GI, recs appreciated  Okay for diet from surgical perspective  Follow-up stool studies        Subjective   No nausea no vomiting.  No bowel movements.    Objective :  Temp:  [97.6 °F (36.4 °C)-97.9 °F (36.6 °C)] 97.8 °F (36.6 °C)  HR:  [62-68] 65  BP: (101-140)/(49-58) 140/54  Resp:  [18-20] 18  SpO2:  [95 %-99 %] 97 %  O2 Device: None (Room air)    I/O         03/10 0701  03/11 0700 03/11 0701  03/12 0700 03/12 0701  03/13 0700    I.V. (mL/kg) 500 (11.5)      IV Piggyback 1550      Total Intake(mL/kg) 2050 (47.1)      Urine (mL/kg/hr) 250      Total Output 250      Net +1800             Unmeasured Urine Occurrence  1 x             Physical Exam  Vitals and nursing note reviewed.   Constitutional:       General: She is not in acute distress.     Appearance: She is well-developed.   HENT:      Head: Normocephalic and atraumatic.   Eyes:      Conjunctiva/sclera: Conjunctivae normal.   Cardiovascular:      Rate and Rhythm: Normal rate and regular rhythm.      Heart sounds: No murmur heard.  Pulmonary:      Effort: Pulmonary effort is normal. No respiratory distress.      Breath sounds: Normal breath sounds.   Abdominal:      General: There is distension.      Palpations: Abdomen is soft.      Tenderness: There is no abdominal tenderness.      Comments: Distended, tympanic, soft, nontender   Musculoskeletal:         General: No swelling.      Cervical back: Neck supple.   Skin:     General: Skin is warm and dry.      Capillary Refill: Capillary refill takes less than 2 seconds.    Neurological:      Mental Status: She is alert.   Psychiatric:         Mood and Affect: Mood normal.           Lab Results: I have reviewed the following results:  Recent Labs     03/10/25  2301 03/11/25  0043 03/11/25  0606 03/11/25  0854 03/11/25  1333 03/12/25  0521   WBC 11.45*  --  9.52  --   --   --    HGB 12.5   < > 10.3*  --    < > 8.7*   HCT 37.9   < > 31.7*  --    < > 26.8*     --  148*  --   --   --    BANDSPCT 10*  --  8  --   --   --    SODIUM 134*  --  135  --   --   --    K 4.2  --  4.1  --   --   --    CL 99  --  104  --   --   --    CO2 24  --  21  --   --   --    BUN 39*  --  31*  --   --   --    CREATININE 1.61*  --  1.20  --   --   --    GLUC 320*  --  244*  --   --   --    AST 24  --   --   --   --   --    ALT 17  --   --   --   --   --    ALB 4.2  --   --   --   --   --    TBILI 0.67  --   --   --   --   --    ALKPHOS 85  --   --   --   --   --    PTT 32  --   --   --   --   --    INR 2.81*  --  1.50*  --   --   --    LACTICACID  --    < > 2.1* 1.9  --   --     < > = values in this interval not displayed.

## 2025-03-13 PROBLEM — R78.81 POSITIVE BLOOD CULTURE: Status: ACTIVE | Noted: 2025-03-13

## 2025-03-13 LAB
ALBUMIN SERPL BCG-MCNC: 3 G/DL (ref 3.5–5)
ALP SERPL-CCNC: 51 U/L (ref 34–104)
ALT SERPL W P-5'-P-CCNC: 9 U/L (ref 7–52)
ANION GAP SERPL CALCULATED.3IONS-SCNC: 7 MMOL/L (ref 4–13)
AST SERPL W P-5'-P-CCNC: 17 U/L (ref 13–39)
BILIRUB SERPL-MCNC: 0.33 MG/DL (ref 0.2–1)
BUN SERPL-MCNC: 14 MG/DL (ref 5–25)
CALCIUM ALBUM COR SERPL-MCNC: 9.2 MG/DL (ref 8.3–10.1)
CALCIUM SERPL-MCNC: 8.4 MG/DL (ref 8.4–10.2)
CHLORIDE SERPL-SCNC: 108 MMOL/L (ref 96–108)
CO2 SERPL-SCNC: 27 MMOL/L (ref 21–32)
CREAT SERPL-MCNC: 0.99 MG/DL (ref 0.6–1.3)
ERYTHROCYTE [DISTWIDTH] IN BLOOD BY AUTOMATED COUNT: 13.3 % (ref 11.6–15.1)
GFR SERPL CREATININE-BSD FRML MDRD: 52 ML/MIN/1.73SQ M
GLUCOSE SERPL-MCNC: 103 MG/DL (ref 65–140)
GLUCOSE SERPL-MCNC: 135 MG/DL (ref 65–140)
GLUCOSE SERPL-MCNC: 84 MG/DL (ref 65–140)
GLUCOSE SERPL-MCNC: 88 MG/DL (ref 65–140)
GLUCOSE SERPL-MCNC: 89 MG/DL (ref 65–140)
HCT VFR BLD AUTO: 28.3 % (ref 34.8–46.1)
HCT VFR BLD AUTO: 29.2 % (ref 34.8–46.1)
HCT VFR BLD AUTO: 29.6 % (ref 34.8–46.1)
HCT VFR BLD AUTO: 30.1 % (ref 34.8–46.1)
HGB BLD-MCNC: 9.3 G/DL (ref 11.5–15.4)
HGB BLD-MCNC: 9.5 G/DL (ref 11.5–15.4)
HGB BLD-MCNC: 9.8 G/DL (ref 11.5–15.4)
HGB BLD-MCNC: 9.9 G/DL (ref 11.5–15.4)
MAGNESIUM SERPL-MCNC: 1.9 MG/DL (ref 1.9–2.7)
MCH RBC QN AUTO: 30.4 PG (ref 26.8–34.3)
MCHC RBC AUTO-ENTMCNC: 32.5 G/DL (ref 31.4–37.4)
MCV RBC AUTO: 93 FL (ref 82–98)
PLATELET # BLD AUTO: 134 THOUSANDS/UL (ref 149–390)
PMV BLD AUTO: 9.9 FL (ref 8.9–12.7)
POTASSIUM SERPL-SCNC: 3.3 MMOL/L (ref 3.5–5.3)
PROT SERPL-MCNC: 5.1 G/DL (ref 6.4–8.4)
RBC # BLD AUTO: 3.13 MILLION/UL (ref 3.81–5.12)
SODIUM SERPL-SCNC: 142 MMOL/L (ref 135–147)
WBC # BLD AUTO: 5.09 THOUSAND/UL (ref 4.31–10.16)

## 2025-03-13 PROCEDURE — 85018 HEMOGLOBIN: CPT | Performed by: SURGERY

## 2025-03-13 PROCEDURE — 85014 HEMATOCRIT: CPT | Performed by: SURGERY

## 2025-03-13 PROCEDURE — 80053 COMPREHEN METABOLIC PANEL: CPT

## 2025-03-13 PROCEDURE — 99232 SBSQ HOSP IP/OBS MODERATE 35: CPT

## 2025-03-13 PROCEDURE — 85027 COMPLETE CBC AUTOMATED: CPT

## 2025-03-13 PROCEDURE — 82948 REAGENT STRIP/BLOOD GLUCOSE: CPT

## 2025-03-13 PROCEDURE — 83735 ASSAY OF MAGNESIUM: CPT

## 2025-03-13 RX ORDER — POTASSIUM CHLORIDE 1500 MG/1
40 TABLET, EXTENDED RELEASE ORAL ONCE
Status: COMPLETED | OUTPATIENT
Start: 2025-03-13 | End: 2025-03-13

## 2025-03-13 RX ADMIN — CEFTRIAXONE SODIUM 1000 MG: 10 INJECTION, POWDER, FOR SOLUTION INTRAVENOUS at 11:51

## 2025-03-13 RX ADMIN — POTASSIUM CHLORIDE 40 MEQ: 1500 TABLET, EXTENDED RELEASE ORAL at 08:39

## 2025-03-13 RX ADMIN — METRONIDAZOLE 500 MG: 500 INJECTION, SOLUTION INTRAVENOUS at 18:36

## 2025-03-13 RX ADMIN — AMLODIPINE BESYLATE 5 MG: 5 TABLET ORAL at 08:39

## 2025-03-13 RX ADMIN — METRONIDAZOLE 500 MG: 500 INJECTION, SOLUTION INTRAVENOUS at 02:22

## 2025-03-13 RX ADMIN — PANTOPRAZOLE SODIUM 40 MG: 40 TABLET, DELAYED RELEASE ORAL at 05:17

## 2025-03-13 RX ADMIN — AMLODIPINE BESYLATE 5 MG: 5 TABLET ORAL at 17:34

## 2025-03-13 RX ADMIN — EZETIMIBE 10 MG: 10 TABLET ORAL at 08:39

## 2025-03-13 RX ADMIN — SODIUM CHLORIDE, SODIUM GLUCONATE, SODIUM ACETATE, POTASSIUM CHLORIDE, MAGNESIUM CHLORIDE, SODIUM PHOSPHATE, DIBASIC, AND POTASSIUM PHOSPHATE 75 ML/HR: .53; .5; .37; .037; .03; .012; .00082 INJECTION, SOLUTION INTRAVENOUS at 14:28

## 2025-03-13 RX ADMIN — METRONIDAZOLE 500 MG: 500 INJECTION, SOLUTION INTRAVENOUS at 10:52

## 2025-03-13 RX ADMIN — LOSARTAN POTASSIUM 50 MG: 50 TABLET, FILM COATED ORAL at 08:38

## 2025-03-13 RX ADMIN — MIRTAZAPINE 15 MG: 15 TABLET, FILM COATED ORAL at 21:28

## 2025-03-13 RX ADMIN — ATORVASTATIN CALCIUM 40 MG: 40 TABLET, FILM COATED ORAL at 08:39

## 2025-03-13 NOTE — ASSESSMENT & PLAN NOTE
Noted to have episodes of bloody diarrhea nursing facility.  Noted to have elevated INR 2.81 not on anticoagulation.  Given reversal in the ED.  Suspect in the setting of proctocolitis versus ischemic colitis.  Surgery/GI following.  Trend hemoglobin every 6 hours.  Transfuse for hemoglobin less than 7.

## 2025-03-13 NOTE — PROGRESS NOTES
Patient:    MRN:  [id number]    Deepti Request ID:  [id number]    Level of care reserved:  Skilled Nursing Facility    Partner Reserved:  [facility] [place] [id number] [phone]    Clinical needs requested:    Geography searched:  10 miles around [id number]    Start of Service:    Request sent:  10:33am EDT on 3/12/2025 by Kar Camara    Partner reserved:  11:28am EDT on 3/13/2025 by Kar Camara    Choice list shared:  11:28am EDT on 3/13/2025 by Kar Camara

## 2025-03-13 NOTE — CASE MANAGEMENT
Case Management Assessment & Discharge Planning Note    Patient name Jo TREVIÑO /S -01 MRN [id number]  : 1940 Date 3/13/2025       Current Admission Date: 3/10/2025  Current Admission Diagnosis:Proctocolitis   Patient Active Problem List    Diagnosis Date Noted Date Diagnosed    Positive blood culture 2025     Acute blood loss anemia 2025     Proctocolitis 2025     Hematochezia 2025     Pancreatic lesion 2025     Hyperglycemia 2025     Lactic acidosis 2025     MCI (mild cognitive impairment) with memory loss 2024     Bilateral carotid artery disease, unspecified type (HCC) 2024     Chronic renal disease, stage IV (HCC) 2022     Protein-calorie malnutrition, unspecified severity ([facility]) 2021     Tremor, essential 2021     History of bilateral carotid endarterectomy 2021     Benign hypertension with CKD (chronic kidney disease) stage IV ([facility])      Dyslipidemia      Coronary artery disease involving native coronary artery of native heart with angina pectoris ([facility]) 2020       LOS (days): 2  Geometric Mean LOS (GMLOS) (days): 2.9  Days to GMLOS:0.4     OBJECTIVE:    Risk of Unplanned Readmission Score: 13.1         Current admission status: Inpatient       Preferred Pharmacy:   [facility] - [place], [place] - [address]  [address]  [facility] [address]  Phone: [phone] Fax: [phone]    [id number] - [place] - [address]  [address] [address]  Phone: [phone] Fax: [phone]    Primary Care Provider: Elly Mojica MD    Primary Insurance: BLUE CROSS [employer] REP  Secondary Insurance: [place] [employer] AND [employer] [facility]    ASSESSMENT:  Active Health Care Proxies    There are no active Health Care Proxies on file.                 Readmission Root Cause  30 Day Readmission: No    Patient Information  Admitted from::  Facility ([facility])  Mental Status: Alert, Confused  During Assessment patient was accompanied by: Not accompanied during assessment  Assessment information provided by:: Sister, Other - please comment ([facility] staff)  Primary Caregiver: Other (Comment)  Caregiver's Name:: Beth  Caregiver's Relationship to Patient:: Other (Specify)  Caregiver's Telephone Number:: [phone]  Support Systems: Family members  County of Residence: [place]  What city do you live in?: [place]  Home entry access options. Select all that apply.: No steps to enter home  Type of Current Residence: Facility  Living Arrangements: Lives in Facility  Is patient a ?: No    Activities of Daily Living Prior to Admission  Functional Status: Assistance  Completes ADLs independently?: No  Level of ADL dependence: Assistance  Ambulates independently?: No  Level of ambulatory dependence: Assistance  Does patient use assisted devices?: Yes  Assisted Devices (DME) used: Walker  Does patient currently own DME?: Yes  What DME does the patient currently own?: Walker         Patient Information Continued  Income Source: SSI/SSD  Does patient have prescription coverage?: Yes  Does patient receive dialysis treatments?: No  Does patient have a history of substance abuse?: No  Does patient have a history of Mental Health Diagnosis?: No         Means of Transportation  Means of Transport to Appts:: Other (Comment) (Facility coordinates)          DISCHARGE DETAILS:    Discharge planning discussed with:: Mariposa Roy (Sister) and [facility] staff  [place] of Choice: Yes  Comments - Freedom of Choice: Sister confirmed plan is for return to [facility] medically stable  CM contacted family/caregiver?: Yes  Were Treatment Team discharge recommendations reviewed with patient/caregiver?: Yes  Did patient/caregiver verbalize understanding of patient care needs?: N/A- going to facility  Were patient/caregiver advised of the risks associated with  not following Treatment Team discharge recommendations?: Yes    Contacts  Patient Contacts: Mariposa Roy (Sister)  Relationship to Patient:: Family  Contact Method: Phone  Phone Number: [phone] (H)  Reason/Outcome: Discharge Planning, Emergency Contact, Referral, Continuity of Care    Requested Home Health Care         Is the patient interested in [facility] at discharge?: No    DME Referral Provided  Referral made for DME?: No    Other Referral/Resources/Interventions Provided:  Interventions: SNF, Facility Return         Treatment Team Recommendation: Facility Return, SNF  Discharge Destination Plan:: Facility Return, SNF                                            Upon review of chart patient is confused at baseline. CM spoke with patient's sister.  CM name and role reviewed. CM assessment completed and charted.    Sister reported plan when medically stable is to return to [facility].      CM also spoke with [facility] team who confirmed patient is an assist X1 with a RW.  [facility] will need a COVID test from this admission and prior auth for any new skilled need.    CM reviewed discharge planning process including the following: identifying caregivers at home, preference for d/c planning needs, [facility] Meds to Bed program, availability of treatment team to discuss questions or concerns patient and/or family may have regarding diagnosis, plan of care, old or new medications and discharge planning.  CM will continue to follow for care coordination and update assessment as necessary.

## 2025-03-13 NOTE — ASSESSMENT & PLAN NOTE
Lab Results   Component Value Date    EGFR 52 03/13/2025    EGFR 41 03/11/2025    EGFR 29 03/10/2025    CREATININE 0.99 03/13/2025    CREATININE 1.20 03/11/2025    CREATININE 1.61 (H) 03/10/2025   Creatinine stable  Continue PTA antihypertensive regimen

## 2025-03-13 NOTE — ASSESSMENT & PLAN NOTE
With + staph epidermidis in 1/2 blood cultures  Suspect contamination, other culture without growth at 48 hours  Is on IV ceftriaxone and flagyl regardless.

## 2025-03-13 NOTE — PLAN OF CARE
Problem: Prexisting or High Potential for Compromised Skin Integrity  Goal: Skin integrity is maintained or improved  Description: INTERVENTIONS:  - Identify patients at risk for skin breakdown  - Assess and monitor skin integrity  - Assess and monitor nutrition and hydration status  - Monitor labs   - Assess for incontinence   - Turn and reposition patient  - Assist with mobility/ambulation  - Relieve pressure over bony prominences  - Avoid friction and shearing  - Provide appropriate hygiene as needed including keeping skin clean and dry  - Evaluate need for skin moisturizer/barrier cream  - Collaborate with interdisciplinary team   - Patient/family teaching  - Consider wound care consult   Outcome: Progressing     Problem: Potential for Falls  Goal: Patient will remain free of falls  Description: INTERVENTIONS:  - Educate patient/family on patient safety including physical limitations  - Instruct patient to call for assistance with activity   - Consult OT/PT to assist with strengthening/mobility   - Keep Call bell within reach  - Keep bed low and locked with side rails adjusted as appropriate  - Keep care items and personal belongings within reach  - Initiate and maintain comfort rounds  - Make Fall Risk Sign visible to staff  - Offer Toileting every  Hours, in advance of need  - Initiate/Maintain alarm  - Obtain necessary fall risk management equipment:   - Apply yellow socks and bracelet for high fall risk patients  - Consider moving patient to room near nurses station  Outcome: Progressing     Problem: PAIN - ADULT  Goal: Verbalizes/displays adequate comfort level or baseline comfort level  Description: Interventions:  - Encourage patient to monitor pain and request assistance  - Assess pain using appropriate pain scale  - Administer analgesics based on type and severity of pain and evaluate response  - Implement non-pharmacological measures as appropriate and evaluate response  - Consider cultural and  social influences on pain and pain management  - Notify physician/advanced practitioner if interventions unsuccessful or patient reports new pain  Outcome: Progressing     Problem: INFECTION - ADULT  Goal: Absence or prevention of progression during hospitalization  Description: INTERVENTIONS:  - Assess and monitor for signs and symptoms of infection  - Monitor lab/diagnostic results  - Monitor all insertion sites, i.e. indwelling lines, tubes, and drains  - Monitor endotracheal if appropriate and nasal secretions for changes in amount and color  - [place] appropriate cooling/warming therapies per order  - Administer medications as ordered  - Instruct and encourage patient and family to use good hand hygiene technique  - Identify and instruct in appropriate isolation precautions for identified infection/condition  Outcome: Progressing  Goal: Absence of fever/infection during neutropenic period  Description: INTERVENTIONS:  - Monitor WBC    Outcome: Progressing     Problem: SAFETY ADULT  Goal: Patient will remain free of falls  Description: INTERVENTIONS:  - Educate patient/family on patient safety including physical limitations  - Instruct patient to call for assistance with activity   - Consult OT/PT to assist with strengthening/mobility   - Keep Call bell within reach  - Keep bed low and locked with side rails adjusted as appropriate  - Keep care items and personal belongings within reach  - Initiate and maintain comfort rounds  - Make Fall Risk Sign visible to staff  - Offer Toileting every  Hours, in advance of need  - Initiate/Maintain alarm  - Obtain necessary fall risk management equipment:   - Apply yellow socks and bracelet for high fall risk patients  - Consider moving patient to room near nurses station  Outcome: Progressing  Goal: Maintain or return to baseline ADL function  Description: INTERVENTIONS:  -  Assess patient's ability to carry out ADLs; assess patient's baseline for ADL function and  identify physical deficits which impact ability to perform ADLs (bathing, care of mouth/teeth, toileting, grooming, dressing, etc.)  - Assess/evaluate cause of self-care deficits   - Assess range of motion  - Assess patient's mobility; develop plan if impaired  - Assess patient's need for assistive devices and provide as appropriate  - Encourage maximum independence but intervene and supervise when necessary  - Involve family in performance of ADLs  - Assess for home care needs following discharge   - Consider OT consult to assist with ADL evaluation and planning for discharge  - Provide patient education as appropriate  Outcome: Progressing  Goal: Maintains/Returns to pre admission functional level  Description: INTERVENTIONS:  - Perform AM-PAC 6 Click Basic Mobility/ Daily Activity assessment daily.  - Set and communicate daily mobility goal to care team and patient/family/caregiver.   - Collaborate with rehabilitation services on mobility goals if consulted  - Perform Range of Motion  times a day.  - Reposition patient every  hours.  - Dangle patient times a day  - Stand patient  times a day  - Ambulate patient  times a day  - Out of bed to chair times a day   - Out of bed for meals  times a day  - Out of bed for toileting  - Record patient progress and toleration of activity level   Outcome: Progressing     Problem: DISCHARGE PLANNING  Goal: Discharge to home or other facility with appropriate resources  Description: INTERVENTIONS:  - Identify barriers to discharge w/patient and caregiver  - Arrange for needed discharge resources and transportation as appropriate  - Identify discharge learning needs (meds, wound care, etc.)  - Arrange for interpretive services to assist at discharge as needed  - Refer to Case Management Department for coordinating discharge planning if the patient needs post-hospital services based on physician/advanced practitioner order or complex needs related to functional status, cognitive  ability, or social support system  Outcome: Progressing     Problem: Knowledge Deficit  Goal: Patient/family/caregiver demonstrates understanding of disease process, treatment plan, medications, and discharge instructions  Description: Complete learning assessment and assess knowledge base.  Interventions:  - Provide teaching at level of understanding  - Provide teaching via preferred learning methods  Outcome: Progressing

## 2025-03-13 NOTE — PROGRESS NOTES
Progress Note - Hospitalist   Name: Jo Jason 84 y.o. female I MRN: [id number]  Unit/Bed#: S -01 I Date of Admission: 3/10/2025   Date of Service: 3/13/2025 I Hospital Day: 2    Assessment & Plan  Proctocolitis  Patient presents from nursing home with vomiting and multiple episodes of bloody diarrhea.  On ASA.  CT abdomen: Marked wall thickening and inflammation of the descending colon, sigmoid colon, and rectum consistent with proctocolitis.  Correlate for ischemia.  No evidence of high-volume GI bleed.  In ED patient was started on IV ceftriaxone and Flagyl, continue.  Obtain stool enteric panel, and fecal calprotectin, not yet collected as patient has not had a BM since being admitted.  General surgery consult recommending nonsurgical management as patient poor surgical candidate.  GI consult, can consider colonoscopy in the future however not planned at this time.  Will continue with observation. Would require GOC discussion with sister if any further intervention is planned.  Colorectal surgery consulted.  Without blood BM >24 hours, Hgb stable.  Continue IV fluids.  Advance to surgical soft diet 3/13.  Hematochezia  Noted to have episodes of bloody diarrhea nursing facility.  Noted to have elevated INR 2.81 not on anticoagulation.  Given reversal in the ED.  Suspect in the setting of proctocolitis versus ischemic colitis.  Surgery/GI following.  Trend hemoglobin every 6 hours.  Transfuse for hemoglobin less than 7.  Acute blood loss anemia  Recent Labs     03/13/25  0014 03/13/25  0458 03/13/25  1154   HGB 9.8* 9.5* 9.3*   In setting of acute GIB.  Hg check q 6h.  Transfuse for <7.  Hyperglycemia  Blood sugar noted to be 320 on BMP  This recent A1c 6.7  Initiated on sliding scale insulin.  Follow up outpatient regarding initiation on chronic medication.  Coronary artery disease involving native coronary artery of native heart with angina pectoris (HCC)  Hold ASA in the setting of GI bleeding  Resume  when able  Continue PTA Lipitor  Benign hypertension with CKD (chronic kidney disease) stage IV (HCC)  Lab Results   Component Value Date    EGFR 52 03/13/2025    EGFR 41 03/11/2025    EGFR 29 03/10/2025    CREATININE 0.99 03/13/2025    CREATININE 1.20 03/11/2025    CREATININE 1.61 (H) 03/10/2025   Creatinine stable  Continue PTA antihypertensive regimen  Dyslipidemia  Continue PTA Lipitor and Zetia.  Pancreatic lesion  CT showing subcentimeter pancreatic cystic lesion  Recommend follow-up every 2 years.  Consider patient's age and clinical status to assess need for further follow-up.  Preferred imaging modality is abdomen MRI and MRCP with and without IV contrast ordered triple phase abdomen CT with IV contrast or abdomen MRI and MRCP without IV contrast.  Discussed finding with patients sister Mariposa who feels patient would not want this further worked up.  Lactic acidosis  Lactic 2.3-> 1.9  Resolved with fluids.  Positive blood culture  With + staph epidermidis in 1/2 blood cultures  Suspect contamination, other culture without growth at 48 hours  Is on IV ceftriaxone and flagyl regardless.    VTE Pharmacologic Prophylaxis: VTE Score: 4 Moderate Risk (Score 3-4) - Pharmacological DVT Prophylaxis Contraindicated. Sequential Compression Devices Ordered.    Mobility:   Basic Mobility Inpatient Raw Score: 6  JH-HLM Goal: 2: Bed activities/Dependent transfer  JH-HLM Achieved: 2: Bed activities/Dependent transfer  JH-HLM Goal achieved. Continue to encourage appropriate mobility.    Patient Centered Rounds: I performed bedside rounds with nursing staff today.   Discussions with Specialists or Other Care Team Provider: Case management, GI, colorectal,    Education and Discussions with Family / Patient: Updated  (sister) via phone.    Current Length of Stay: 2 day(s)  Current Patient Status: Inpatient   Certification Statement: The patient will continue to require additional inpatient hospital stay due to  return of p.o. tolerance  Discharge Plan: Anticipate discharge in 48 hrs to rehab facility.    Code Status: Level 3 - DNAR and DNI    Subjective   No new complaints    Objective :  Temp:  [97.5 °F (36.4 °C)-98 °F (36.7 °C)] 97.5 °F (36.4 °C)  HR:  [60-63] 62  BP: (117-138)/(51-56) 117/52  Resp:  [16-18] 16  SpO2:  [95 %-98 %] 95 %  O2 Device: None (Room air)    Body mass index is 18.73 kg/m².     Input and Output Summary (last 24 hours):     Intake/Output Summary (Last 24 hours) at 3/13/2025 1221  Last data filed at 3/13/2025 0944  Gross per 24 hour   Intake 310 ml   Output 925 ml   Net -615 ml       Physical Exam  Vitals reviewed.   Constitutional:       General: She is not in acute distress.     Appearance: Normal appearance. She is cachectic. She is not toxic-appearing.   HENT:      Head: Normocephalic and atraumatic.   Eyes:      General: No scleral icterus.  Cardiovascular:      Rate and Rhythm: Normal rate and regular rhythm.      Heart sounds: Normal heart sounds. No murmur heard.  Pulmonary:      Effort: Pulmonary effort is normal. No respiratory distress.      Breath sounds: Normal breath sounds. No stridor.   Abdominal:      General: Abdomen is flat. Bowel sounds are normal. There is no distension.      Palpations: Abdomen is soft.      Tenderness: There is no abdominal tenderness.   Musculoskeletal:         General: Normal range of motion.      Cervical back: Normal range of motion.      Right lower leg: No edema.      Left lower leg: No edema.   Skin:     Coloration: Skin is not jaundiced or pale.   Neurological:      Mental Status: She is alert.      Comments: Sleeping, easily awakened. Answering questions appropriately.           Lab Results: I have reviewed the following results:   Results from last 7 days   Lab Units 03/13/25  1154 03/13/25  0458 03/11/25  1333 03/11/25  0606   WBC Thousand/uL  --  5.09  --  9.52   HEMOGLOBIN g/dL 9.3* 9.5*   < > 10.3*   HEMATOCRIT % 28.3* 29.2*   < > 31.7*    PLATELETS Thousands/uL  --  134*  --  148*   BANDS PCT %  --   --   --  8   LYMPHO PCT %  --   --   --  2*   MONO PCT %  --   --   --  4   EOS PCT %  --   --   --  0    < > = values in this interval not displayed.     Results from last 7 days   Lab Units 03/13/25  0458   SODIUM mmol/L 142   POTASSIUM mmol/L 3.3*   CHLORIDE mmol/L 108   CO2 mmol/L 27   BUN mg/dL 14   CREATININE mg/dL 0.99   ANION GAP mmol/L 7   CALCIUM mg/dL 8.4   ALBUMIN g/dL 3.0*   TOTAL BILIRUBIN mg/dL 0.33   ALK PHOS U/L 51   ALT U/L 9   AST U/L 17   GLUCOSE RANDOM mg/dL 84     Results from last 7 days   Lab Units 03/11/25  0606   INR  1.50*     Results from last 7 days   Lab Units 03/13/25  1100 03/13/25  0714 03/12/25  2125 03/12/25  1601 03/12/25  1229   POC GLUCOSE mg/dl 103 89 106 108 114     Results from last 7 days   Lab Units 03/11/25  0332   HEMOGLOBIN A1C % 6.7*     Results from last 7 days   Lab Units 03/12/25  0521 03/11/25  0854 03/11/25  0606 03/11/25  0332 03/11/25  0043 03/10/25  2301   LACTIC ACID mmol/L  --  1.9 2.1* 2.5* 2.3*  --    PROCALCITONIN ng/ml 17.38*  --   --   --   --  0.46*       Recent Cultures (last 7 days):   Results from last 7 days   Lab Units 03/11/25  0043 03/10/25  2344   BLOOD CULTURE  Staphylococcus epidermidis*  No Growth at 48 hrs.  --    GRAM STAIN RESULT  Gram positive cocci in clusters*  --    URINE CULTURE   --  No Growth <1000 cfu/mL       Imaging Results Review: I reviewed radiology reports from this admission including: CT chest and CT abdomen/pelvis.  Other Study Results Review: Pathology reports reviewed.    Last 24 Hours Medication List:     Current Facility-Administered Medications:     amLODIPine (NORVASC) tablet 5 mg, BID    [Held by provider] aspirin (ECOTRIN LOW STRENGTH) EC tablet 81 mg, Daily    atorvastatin (LIPITOR) tablet 40 mg, Daily    ceftriaxone (ROCEPHIN) 1 g/50 mL in dextrose IVPB, Q24H, Last Rate: 1,000 mg (03/13/25 1151)    ezetimibe (ZETIA) tablet 10 mg, Daily    insulin  lispro (HumALOG/ADMELOG) 100 units/mL subcutaneous injection 1-5 Units, TID AC **AND** Fingerstick Glucose (POCT), TID AC    losartan (COZAAR) tablet 50 mg, Daily    metroNIDAZOLE (FLAGYL) IVPB (premix) 500 mg 100 mL, Q8H, Last Rate: 500 mg (03/13/25 1052)    mirtazapine (REMERON) tablet 15 mg, HS    multi-electrolyte (Plasmalyte-A/Isolyte-S PH 7.4/Normosol-R) IV solution, Continuous, Last Rate: 75 mL/hr (03/12/25 5458)    pantoprazole (PROTONIX) EC tablet 40 mg, Early Morning    Administrative Statements   Today, Patient Was Seen By: Cristin Kirby PA-C    **Please Note: This note may have been constructed using a voice recognition system.**

## 2025-03-13 NOTE — ASSESSMENT & PLAN NOTE
Patient presents from nursing home with vomiting and multiple episodes of bloody diarrhea.  On ASA.  CT abdomen: Marked wall thickening and inflammation of the descending colon, sigmoid colon, and rectum consistent with proctocolitis.  Correlate for ischemia.  No evidence of high-volume GI bleed.  In ED patient was started on IV ceftriaxone and Flagyl, continue.  Obtain stool enteric panel, and fecal calprotectin, not yet collected as patient has not had a BM since being admitted.  General surgery consult recommending nonsurgical management as patient poor surgical candidate.  GI consult, can consider colonoscopy in the future however not planned at this time.  Will continue with observation. Would require GOC discussion with sister if any further intervention is planned.  Colorectal surgery consulted.  Without blood BM >24 hours, Hgb stable.  Continue IV fluids.  Advance to surgical soft diet 3/13.

## 2025-03-13 NOTE — ASSESSMENT & PLAN NOTE
Recent Labs     03/13/25  0014 03/13/25  0458 03/13/25  1154   HGB 9.8* 9.5* 9.3*   In setting of acute GIB.  Hg check q 6h.  Transfuse for <7.

## 2025-03-14 VITALS
DIASTOLIC BLOOD PRESSURE: 55 MMHG | HEIGHT: 60 IN | HEART RATE: 69 BPM | RESPIRATION RATE: 18 BRPM | WEIGHT: 95.9 LBS | BODY MASS INDEX: 18.83 KG/M2 | OXYGEN SATURATION: 98 % | SYSTOLIC BLOOD PRESSURE: 123 MMHG | TEMPERATURE: 97.9 F

## 2025-03-14 LAB
ANION GAP SERPL CALCULATED.3IONS-SCNC: 9 MMOL/L (ref 4–13)
BACTERIA BLD CULT: ABNORMAL
BUN SERPL-MCNC: 10 MG/DL (ref 5–25)
CALCIUM SERPL-MCNC: 8.5 MG/DL (ref 8.4–10.2)
CHLORIDE SERPL-SCNC: 108 MMOL/L (ref 96–108)
CO2 SERPL-SCNC: 26 MMOL/L (ref 21–32)
CREAT SERPL-MCNC: 0.98 MG/DL (ref 0.6–1.3)
ERYTHROCYTE [DISTWIDTH] IN BLOOD BY AUTOMATED COUNT: 13.2 % (ref 11.6–15.1)
FLUAV RNA RESP QL NAA+PROBE: NEGATIVE
FLUBV RNA RESP QL NAA+PROBE: NEGATIVE
GFR SERPL CREATININE-BSD FRML MDRD: 53 ML/MIN/1.73SQ M
GLUCOSE SERPL-MCNC: 101 MG/DL (ref 65–140)
GLUCOSE SERPL-MCNC: 184 MG/DL (ref 65–140)
GLUCOSE SERPL-MCNC: 89 MG/DL (ref 65–140)
GRAM STN SPEC: ABNORMAL
HCT VFR BLD AUTO: 30.9 % (ref 34.8–46.1)
HCT VFR BLD AUTO: 32.4 % (ref 34.8–46.1)
HGB BLD-MCNC: 10 G/DL (ref 11.5–15.4)
HGB BLD-MCNC: 10.5 G/DL (ref 11.5–15.4)
MAGNESIUM SERPL-MCNC: 1.9 MG/DL (ref 1.9–2.7)
MCH RBC QN AUTO: 30.4 PG (ref 26.8–34.3)
MCHC RBC AUTO-ENTMCNC: 32.4 G/DL (ref 31.4–37.4)
MCV RBC AUTO: 94 FL (ref 82–98)
MECA+MECC ISLT/SPM QL: DETECTED
PLATELET # BLD AUTO: 149 THOUSANDS/UL (ref 149–390)
PMV BLD AUTO: 10.1 FL (ref 8.9–12.7)
POTASSIUM SERPL-SCNC: 3.3 MMOL/L (ref 3.5–5.3)
RBC # BLD AUTO: 3.29 MILLION/UL (ref 3.81–5.12)
RSV RNA RESP QL NAA+PROBE: NEGATIVE
S EPIDERMIDIS DNA BLD POS QL NAA+NON-PRB: DETECTED
SARS-COV-2 RNA RESP QL NAA+PROBE: NEGATIVE
SODIUM SERPL-SCNC: 143 MMOL/L (ref 135–147)
WBC # BLD AUTO: 5.17 THOUSAND/UL (ref 4.31–10.16)

## 2025-03-14 PROCEDURE — 92526 ORAL FUNCTION THERAPY: CPT

## 2025-03-14 PROCEDURE — 85018 HEMOGLOBIN: CPT | Performed by: SURGERY

## 2025-03-14 PROCEDURE — 0241U HB NFCT DS VIR RESP RNA 4 TRGT: CPT

## 2025-03-14 PROCEDURE — 85014 HEMATOCRIT: CPT | Performed by: SURGERY

## 2025-03-14 PROCEDURE — 82948 REAGENT STRIP/BLOOD GLUCOSE: CPT

## 2025-03-14 PROCEDURE — 83735 ASSAY OF MAGNESIUM: CPT

## 2025-03-14 PROCEDURE — 85027 COMPLETE CBC AUTOMATED: CPT

## 2025-03-14 PROCEDURE — 97167 OT EVAL HIGH COMPLEX 60 MIN: CPT

## 2025-03-14 PROCEDURE — 99239 HOSP IP/OBS DSCHRG MGMT >30: CPT

## 2025-03-14 PROCEDURE — 80048 BASIC METABOLIC PNL TOTAL CA: CPT

## 2025-03-14 RX ORDER — METRONIDAZOLE 500 MG/1
500 TABLET ORAL EVERY 8 HOURS SCHEDULED
Qty: 6 TABLET | Refills: 0 | Status: SHIPPED
Start: 2025-03-14 | End: 2025-03-16

## 2025-03-14 RX ORDER — CEFUROXIME AXETIL 500 MG/1
500 TABLET ORAL EVERY 24 HOURS
Qty: 2 TABLET | Refills: 0 | Status: SHIPPED
Start: 2025-03-14 | End: 2025-03-16

## 2025-03-14 RX ORDER — PANTOPRAZOLE SODIUM 40 MG/1
40 TABLET, DELAYED RELEASE ORAL
Qty: 30 TABLET | Refills: 0 | Status: SHIPPED
Start: 2025-03-15

## 2025-03-14 RX ADMIN — ATORVASTATIN CALCIUM 40 MG: 40 TABLET, FILM COATED ORAL at 09:38

## 2025-03-14 RX ADMIN — AMLODIPINE BESYLATE 5 MG: 5 TABLET ORAL at 09:38

## 2025-03-14 RX ADMIN — PANTOPRAZOLE SODIUM 40 MG: 40 TABLET, DELAYED RELEASE ORAL at 05:06

## 2025-03-14 RX ADMIN — INSULIN LISPRO 1 UNITS: 100 INJECTION, SOLUTION INTRAVENOUS; SUBCUTANEOUS at 11:41

## 2025-03-14 RX ADMIN — METRONIDAZOLE 500 MG: 500 INJECTION, SOLUTION INTRAVENOUS at 02:20

## 2025-03-14 RX ADMIN — LOSARTAN POTASSIUM 50 MG: 50 TABLET, FILM COATED ORAL at 09:38

## 2025-03-14 RX ADMIN — METRONIDAZOLE 500 MG: 500 INJECTION, SOLUTION INTRAVENOUS at 11:40

## 2025-03-14 RX ADMIN — EZETIMIBE 10 MG: 10 TABLET ORAL at 09:38

## 2025-03-14 RX ADMIN — Medication 3 MG: at 02:13

## 2025-03-14 RX ADMIN — CEFTRIAXONE SODIUM 1000 MG: 10 INJECTION, POWDER, FOR SOLUTION INTRAVENOUS at 12:30

## 2025-03-14 RX ADMIN — SODIUM CHLORIDE, SODIUM GLUCONATE, SODIUM ACETATE, POTASSIUM CHLORIDE, MAGNESIUM CHLORIDE, SODIUM PHOSPHATE, DIBASIC, AND POTASSIUM PHOSPHATE 75 ML/HR: .53; .5; .37; .037; .03; .012; .00082 INJECTION, SOLUTION INTRAVENOUS at 05:07

## 2025-03-14 NOTE — ASSESSMENT & PLAN NOTE
Patient presents from nursing home with vomiting and multiple episodes of bloody diarrhea.  On ASA.  CT abdomen: Marked wall thickening and inflammation of the descending colon, sigmoid colon, and rectum consistent with proctocolitis.  Correlate for ischemia.  No evidence of high-volume GI bleed.  In ED patient was started on IV ceftriaxone and Flagyl, continue w 2 additional days of PO flagyl and cefuroxime on discharge.  Not able to obtain stool enteric panel, and fecal calprotectin.  General surgery consult recommending nonsurgical management as patient poor surgical candidate.  GI consult, can consider colonoscopy in the future however not planned at this time.   Without blood BM >48 hours, Hgb stable.  Continue IV fluids.  Advance to dysphagia dental soft lo residue diet with good tolerance.  GI signed off, ok to be discharged.

## 2025-03-14 NOTE — ASSESSMENT & PLAN NOTE
Noted to have episodes of bloody diarrhea nursing facility.  Noted to have elevated INR 2.81 not on anticoagulation.  Given reversal in the ED.  Suspect in the setting of proctocolitis versus ischemic colitis.  Surgery/GI following, signed off  Without bloody BMS these past 2 days, Hg stable  Discharged on 2 further days of PO antibiotics.

## 2025-03-14 NOTE — PLAN OF CARE
Problem: Prexisting or High Potential for Compromised Skin Integrity  Goal: Skin integrity is maintained or improved  Description: INTERVENTIONS:  - Identify patients at risk for skin breakdown  - Assess and monitor skin integrity  - Assess and monitor nutrition and hydration status  - Monitor labs   - Assess for incontinence   - Turn and reposition patient  - Assist with mobility/ambulation  - Relieve pressure over bony prominences  - Avoid friction and shearing  - Provide appropriate hygiene as needed including keeping skin clean and dry  - Evaluate need for skin moisturizer/barrier cream  - Collaborate with interdisciplinary team   - Patient/family teaching  - Consider wound care consult   Outcome: Progressing     Problem: Potential for Falls  Goal: Patient will remain free of falls  Description: INTERVENTIONS:  - Educate patient/family on patient safety including physical limitations  - Instruct patient to call for assistance with activity   - Consult OT/PT to assist with strengthening/mobility   - Keep Call bell within reach  - Keep bed low and locked with side rails adjusted as appropriate  - Keep care items and personal belongings within reach  - Initiate and maintain comfort rounds  - Make Fall Risk Sign visible to staff  - Offer Toileting every 2 Hours, in advance of need  - Initiate/Maintain bed alarm  - Obtain necessary fall risk management equipment:   - Apply yellow socks and bracelet for high fall risk patients  - Consider moving patient to room near nurses station  Outcome: Progressing     Problem: PAIN - ADULT  Goal: Verbalizes/displays adequate comfort level or baseline comfort level  Description: Interventions:  - Encourage patient to monitor pain and request assistance  - Assess pain using appropriate pain scale  - Administer analgesics based on type and severity of pain and evaluate response  - Implement non-pharmacological measures as appropriate and evaluate response  - Consider cultural and  social influences on pain and pain management  - Notify physician/advanced practitioner if interventions unsuccessful or patient reports new pain  Outcome: Progressing     Problem: INFECTION - ADULT  Goal: Absence or prevention of progression during hospitalization  Description: INTERVENTIONS:  - Assess and monitor for signs and symptoms of infection  - Monitor lab/diagnostic results  - Monitor all insertion sites, i.e. indwelling lines, tubes, and drains  - Monitor endotracheal if appropriate and nasal secretions for changes in amount and color  - [place] appropriate cooling/warming therapies per order  - Administer medications as ordered  - Instruct and encourage patient and family to use good hand hygiene technique  - Identify and instruct in appropriate isolation precautions for identified infection/condition  Outcome: Progressing  Goal: Absence of fever/infection during neutropenic period  Description: INTERVENTIONS:  - Monitor WBC    Outcome: Progressing     Problem: SAFETY ADULT  Goal: Patient will remain free of falls  Description: INTERVENTIONS:  - Educate patient/family on patient safety including physical limitations  - Instruct patient to call for assistance with activity   - Consult OT/PT to assist with strengthening/mobility   - Keep Call bell within reach  - Keep bed low and locked with side rails adjusted as appropriate  - Keep care items and personal belongings within reach  - Initiate and maintain comfort rounds  - Make Fall Risk Sign visible to staff  - Offer Toileting every 2 Hours, in advance of need  - Initiate/Maintain bedalarm  - Obtain necessary fall risk management equipment:   - Apply yellow socks and bracelet for high fall risk patients  - Consider moving patient to room near nurses station  Outcome: Progressing  Goal: Maintain or return to baseline ADL function  Description: INTERVENTIONS:  -  Assess patient's ability to carry out ADLs; assess patient's baseline for ADL function and  identify physical deficits which impact ability to perform ADLs (bathing, care of mouth/teeth, toileting, grooming, dressing, etc.)  - Assess/evaluate cause of self-care deficits   - Assess range of motion  - Assess patient's mobility; develop plan if impaired  - Assess patient's need for assistive devices and provide as appropriate  - Encourage maximum independence but intervene and supervise when necessary  - Involve family in performance of ADLs  - Assess for home care needs following discharge   - Consider OT consult to assist with ADL evaluation and planning for discharge  - Provide patient education as appropriate  Outcome: Progressing  Goal: Maintains/Returns to pre admission functional level  Description: INTERVENTIONS:  - Perform AM-PAC 6 Click Basic Mobility/ Daily Activity assessment daily.  - Set and communicate daily mobility goal to care team and patient/family/caregiver.   - Collaborate with rehabilitation services on mobility goals if consulted  - Perform Range of Motion 2 times a day.  - Reposition patient every 2 hours.  - Dangle patient 2 times a day  - Stand patient 2 times a day  - Ambulate patient 2 times a day  - Out of bed to chair 2 times a day   - Out of bed for meals 2 times a day  - Out of bed for toileting  - Record patient progress and toleration of activity level   Outcome: Progressing     Problem: DISCHARGE PLANNING  Goal: Discharge to home or other facility with appropriate resources  Description: INTERVENTIONS:  - Identify barriers to discharge w/patient and caregiver  - Arrange for needed discharge resources and transportation as appropriate  - Identify discharge learning needs (meds, wound care, etc.)  - Arrange for interpretive services to assist at discharge as needed  - Refer to Case Management Department for coordinating discharge planning if the patient needs post-hospital services based on physician/advanced practitioner order or complex needs related to functional status,  cognitive ability, or social support system  Outcome: Progressing     Problem: Knowledge Deficit  Goal: Patient/family/caregiver demonstrates understanding of disease process, treatment plan, medications, and discharge instructions  Description: Complete learning assessment and assess knowledge base.  Interventions:  - Provide teaching at level of understanding  - Provide teaching via preferred learning methods  Outcome: Progressing

## 2025-03-14 NOTE — SPEECH THERAPY NOTE
"Speech Language/Pathology    Speech/Language Pathology Progress Note    Patient Name: Jo Jason  Today's Date: 3/14/2025       Subjective:  Pt seen for dysphagia tx follow up. Pt was advanced to dysphagia 2 diet as recommended, but then changed to dysphagia 3. Pt seen now w/ dysphagia 3 lunch.   Objective:  Pt able to self feed, c/o no being too hungry. Pt ate peaches and rice pudding, took sips of apple juice by straw. Pt trialed chicken salad and chopped green beans.  Pt appeared w/ adequate mastication but stated she's \"not hungry for that now\".  No overt s/s aspiration w/ liquids and no difficulty w/ mech soft foods.     Assessment:  Pt tolerated mech soft foods and thin liquids, although poor po intake     Plan/Recommendations:  Cont dysphagia 3 diet for now, will follow up as able  Speech can follow up at [facility]        Mirela Guadarrama MA CCC-SLP  Speech Pathologist  Available via SecureChat      "

## 2025-03-14 NOTE — ASSESSMENT & PLAN NOTE
Recent Labs     03/13/25  1831 03/14/25  0004 03/14/25  0456   HGB 9.9* 10.5* 10.0*   In setting of acute GIB. Stabilized.  Hg check q 6h.  Transfuse for <7.

## 2025-03-14 NOTE — CASE MANAGEMENT
Case Management Discharge Planning Note    Patient name Jo TREVIÑO /S -01 MRN [id number]  : 1940 Date 3/14/2025       Current Admission Date: 3/10/2025  Current Admission Diagnosis:Proctocolitis   Patient Active Problem List    Diagnosis Date Noted Date Diagnosed    Positive blood culture 2025     Acute blood loss anemia 2025     Proctocolitis 2025     Hematochezia 2025     Pancreatic lesion 2025     Hyperglycemia 2025     Lactic acidosis 2025     MCI (mild cognitive impairment) with memory loss 2024     Bilateral carotid artery disease, unspecified type (HCC) 2024     Chronic renal disease, stage IV (HCC) 2022     Protein-calorie malnutrition, unspecified severity (HCC) 2021     Tremor, essential 2021     History of bilateral carotid endarterectomy 2021     Benign hypertension with CKD (chronic kidney disease) stage IV (HCC)      Dyslipidemia      Coronary artery disease involving native coronary artery of native heart with angina pectoris ([facility]) 2020       LOS (days): 3  Geometric Mean LOS (GMLOS) (days): 2.9  Days to GMLOS:-0.5     OBJECTIVE:  Risk of Unplanned Readmission Score: 11.85         Current admission status: Inpatient   Preferred Pharmacy:   [facility] - [place], [place] - [address]  [address]  [facility] [address]  Phone: [phone] Fax: [phone]    [id number] - [place] - [address]   [place] [address]  Phone: [phone] Fax: [phone]    Primary Care Provider: Elly Mojica MD    Primary Insurance: BLUE CROSS  REP  Secondary Insurance: [place] [employer] [facility]    DISCHARGE DETAILS:    Discharge planning discussed with:: Patient and Mariposa Roy (Sister)  Freedom of Choice: Yes  Comments - Freedom of Choice: Reviewed DCP of return to [facility] contacted family/caregiver?:  Yes  Were Treatment Team discharge recommendations reviewed with patient/caregiver?: Yes  Did patient/caregiver verbalize understanding of patient care needs?: N/A- going to facility  Were patient/caregiver advised of the risks associated with not following Treatment Team discharge recommendations?: Yes    Contacts  Patient Contacts: Mariposa Roy (Sister)  Relationship to Patient:: Family  Contact Method: Phone  Phone Number: [phone] (H)  Reason/Outcome: Discharge Planning, Emergency Contact, Continuity of Care    Requested Home Health Care         Is the patient interested in HHC at discharge?: No    DME Referral Provided  Referral made for DME?: No    Other Referral/Resources/Interventions Provided:  Interventions: Facility Return, SNF, Transportation         Treatment Team Recommendation: Facility Return, SNF  Discharge Destination Plan:: Facility Return, SNF  Transport at Discharge : Wheelchair van     Number/Name of Dispatcher: Requested via Roundtrip  Transported by (Company and Unit #): [facility]  ETA of Transport (Date): 03/14/25  ETA of Transport (Time): 1345              IMM Given (Date):: 03/14/25  IMM Given to:: Family  Family notified:: Reviewed with sister over the phone     IMM reviewed with patient.  Patient agrees with discharge determination.     CM was notified that patient is medically stable to DC back to her facility today.  CM requested a COVID test be ordered and once resulted the results were attached to the Mikael referral.    [facility] transport was requested via Roundtrip for 1:45 pm and claimed by [facility] for 1:45 pm.    CM spoke with patient's sister and reviewed the DCP and planned transport time.    SLIM, primary nurse, patient family and facility all aware of the DCP and transport time.    No further  DC needs were discussed or identified at this time.

## 2025-03-14 NOTE — OCCUPATIONAL THERAPY NOTE
Occupational Therapy Evaluation     Patient Name: Jo Jason  Today's Date: 3/14/2025  Problem List  Principal Problem:    Proctocolitis  Active Problems:    Coronary artery disease involving native coronary artery of native heart with angina pectoris (HCC)    Benign hypertension with CKD (chronic kidney disease) stage IV (HCC)    Dyslipidemia    Hematochezia    Pancreatic lesion    Hyperglycemia    Lactic acidosis    Acute blood loss anemia    Positive blood culture    Past Medical History  Past Medical History:   Diagnosis Date    Dyslipidemia     Hypertension     Tremor      Past Surgical History  Past Surgical History:   Procedure Laterality Date    CARDIAC SURGERY      CAROTID ARTERY ANGIOPLASTY      CATARACT EXTRACTION Bilateral     CLOSED MANIPULATION SHOULDER               03/14/25 0823   OT Last Visit   OT Visit Date 03/14/25   Note Type   Note type Evaluation   Pain Assessment   Pain Assessment Tool 0-10   Pain Score No Pain   Restrictions/Precautions   Weight Bearing Precautions Per Order No   Other Precautions Cognitive;Chair Alarm;Bed Alarm;Multiple lines  (IV pole)   Home Living   Type of Home SNF  ([facility])   Home Equipment Walker   Additional Comments Per chart review pt is Ax1 with RW at baseline   Prior Function   Level of [place] Needs assistance with ADLs   Lives With Facility staff   Receives Help From Personal care attendant   IADLs Family/Friend/Other provides transportation;Family/Friend/Other provides meals;Family/Friend/Other provides medication management   Falls in the last 6 months   (unable to recall)   Vocational Retired   Comments Pt is a POOR historian, unable to recall living at [facility]- providing conflicting home set-up/PLOF information   Lifestyle   Autonomy PTA pt living at [facility], pt requiring (A) with ADLs and IADLs, (-)falls. (-)drives, use of RW with ax1 at baseline   Reciprocal Relationships supportive sister is local, facility staff   Service to  "Others retired   Intrinsic Gratification unable to recall   General   Additional Pertinent History Admit due to rectal bleeding. PMH of CAD, CKD stage V, dementia, hypertension, tremor   Subjective   Subjective \"I don't, I don't know where\"   ADL   Eating Assistance 5  Supervision/Setup   Eating Deficit Increased time to complete;Supervision/safety   Grooming Assistance 5  Supervision/Setup   Grooming Deficit Increased time to complete;Supervision/safety;Verbal cueing;Wash/dry hands   UB Bathing Assistance 4  Minimal Assistance   LB Bathing Assistance 3  Moderate Assistance   UB Dressing Assistance 3  Moderate Assistance   UB Dressing Deficit Increased time to complete;Thread RUE;Thread LUE;Pull around back   LB Dressing Assistance 3  Moderate Assistance   LB Dressing Deficit Increased time to complete;Supervision/safety;Verbal cueing;Don/doff R sock;Don/doff L sock   Toileting Assistance  3  Moderate Assistance   Toileting Deficit Increased time to complete;Supervison/safety;Verbal cueing;Clothing management down;Perineal hygiene;Clothing management up  (A with locating and folding toilet paper, cuing to lift gown prior to completing hygiene)   Bed Mobility   Supine to Sit 3  Moderate assistance   Additional items Assist x 1;Increased time required;Verbal cues;LE management   Transfers   Sit to Stand 4  Minimal assistance   Additional items Assist x 1;Increased time required;Verbal cues   Stand to Sit 4  Minimal assistance   Additional items Assist x 1;Increased time required;Verbal cues   Toilet transfer 3  Moderate assistance   Additional items Assist x 1;Increased time required;Verbal cues;Standard toilet   Additional Comments requiring extensive cuing for transfers with intermittent hand over hand assist for placement on armrests. A with step by step sequencing of how to back up to chair   Functional Mobility   Functional Mobility 4  Minimal assistance   Additional Comments Intermittent min-mod A x1 to/from " bathroom. Requiring A for steering RW at times and navigating obstacles   Additional items Rolling walker   Balance   Static Sitting Fair +   Dynamic Sitting Fair -   Static Standing Poor +   Dynamic Standing Poor +   Ambulatory Poor   Activity Tolerance   Activity Tolerance Patient limited by fatigue   Medical Staff Made Aware YANELY Mas, spoke to PT Martina PRATT Assessment   RUE Assessment WFL   LUE Assessment   LUE Assessment WFL   Hand Function   Gross Motor Coordination Functional   Fine Motor Coordination Impaired   Hand Function Comments noted with tremors in BUE   Cognition   Overall Cognitive Status Impaired   Arousal/Participation Alert;Cooperative   Attention Attends with cues to redirect   Orientation Level Oriented to person;Disoriented to place;Disoriented to time;Disoriented to situation   Memory Decreased short term memory;Decreased recall of recent events;Decreased recall of precautions   Following Commands Follows one step commands with increased time or repetition   Comments pleasantly confused, poor problem solving and insight into deficits   Assessment   Limitation Decreased ADL status;Decreased Safe judgement during ADL;Decreased cognition;Decreased endurance;Decreased self-care trans;Decreased high-level ADLs  (impaired balance, fxnl mobility, act flora, FM coord, fxnl reach, standing flora, strength, attention to task, direction following, safety awareness, insight, pacing, problem solving, learning new tasks, response time)   Prognosis Good   Assessment Pt is a 84 y.o. female seen for OT evaluation s/p admission to [facility] on 3/10/2025 due to rectal bleeding. Diagnosed with Proctocolitis. Personal and env factors supporting pt at time of IE include supportive facility staff to (A) with ADLS and IADLs, accessible home environment, and FFSU. Personal and env factors inhibiting engagement in occupations include advanced age, lifestyle patterns, difficulty completing ADLs, and difficulty completing IADLs.  Performance deficits that affect the pt’s occupational performance can be seen above. Due to pt's current functional limitations and medical complications pt is functioning below baseline. Pt would benefit from continued skilled OT treatment in order to maximize safety, independence and overall performance with ADLs, functional mobility, functional transfers, and cognition in order to achieve highest level of function.   Goals   Patient Goals to be clean   LTG Time Frame 10-14   Long Term Goal see goals listed below   Plan   Treatment Interventions ADL retraining;Functional transfer training;Endurance training;Cognitive reorientation;Patient/family training;Equipment evaluation/education;Compensatory technique education;Energy conservation;Activityengagement   Goal Expiration Date 03/24/25   OT Treatment Day 0   OT Frequency 2-3x/wk   Discharge Recommendation   Rehab Resource Intensity Level, OT III (Minimum Resource Intensity)  (as long as facility able to continue to provide (A) with ADLs and functional mobility)   AM-PAC Daily Activity Inpatient   Lower Body Dressing 2   Bathing 2   Toileting 2   Upper Body Dressing 2   Grooming 3   Eating 3   Daily Activity Raw Score 14   Daily Activity Standardized Score (Calc for Raw Score >=11) 33.39   AM-PAC Applied Cognition Inpatient   Following a Speech/Presentation 2   Understanding Ordinary Conversation 3   Taking Medications 1   Remembering Where Things Are Placed or Put Away 1   Remembering List of 4-5 Errands 1   Taking Care of Complicated Tasks 1   Applied Cognition Raw Score 9   Applied Cognition Standardized Score 22.48   End of Consult   Patient Position at End of Consult Bedside chair;All needs within reach;Bed/Chair alarm activated     GOALS:      -Patient will perform grooming tasks standing at sink with overall Supervision in order to increase overall independence    -Patient will be Supervision with UB dressing using AE and AD as needed in order to increase  (I) with ADLs    -Patient will be Supervision with UB bathing using AE and AD as needed in order to increase (I) with ADLs    -Patient will be Min A  with LB dressing with use of AE and AD as needed in order to increase (I) with ADLs    -Patient will be Min A  with LB bathing with use of AE and AD as needed in order to increase (I) with ADLs    -Patient will complete toileting w/ Supervision w/ G hygiene/thoroughness in order to reduce caregiver burden    -Patient will demonstrate Supervision with bed mobility for ability to manage own comfort and initiate OOB tasks.     -Patient will perform functional transfers with Supervision to/from all surfaces using DME as needed in order to increase (I) with functional tasks    -Patient will be Min A x 1 with functional mobility to/from bathroom for increased independence with toileting tasks    -Patient will tolerate therapeutic activities for greater than 30 min, in order to increase tolerance for functional activities.     -Patient’s caregivers will be independent with providing appropriate cognitive cues in order to facilitate patient’s independence during functional tasks.      The patient's raw score on the -PAC Daily Activity Inpatient Short Form is 14. A raw score of less than 19 suggests the patient may benefit from discharge to post-acute rehabilitation services. HOWEVER please refer to the recommendation of the Occupational Therapist for safe discharge planning.      Chula Gomez MS, OTR/L

## 2025-03-14 NOTE — ASSESSMENT & PLAN NOTE
Lab Results   Component Value Date    EGFR 53 03/14/2025    EGFR 52 03/13/2025    EGFR 41 03/11/2025    CREATININE 0.98 03/14/2025    CREATININE 0.99 03/13/2025    CREATININE 1.20 03/11/2025   Creatinine stable  Continue PTA antihypertensive regimen

## 2025-03-14 NOTE — DISCHARGE SUMMARY
Discharge Summary - Hospitalist   Name: Jo Jason 84 y.o. female I MRN: [id number]  Unit/Bed#: S -01 I Date of Admission: 3/10/2025   Date of Service: 3/14/2025 I Hospital Day: 3     Assessment & Plan  Proctocolitis  Patient presents from nursing home with vomiting and multiple episodes of bloody diarrhea.  On ASA.  CT abdomen: Marked wall thickening and inflammation of the descending colon, sigmoid colon, and rectum consistent with proctocolitis.  Correlate for ischemia.  No evidence of high-volume GI bleed.  In ED patient was started on IV ceftriaxone and Flagyl, continue w 2 additional days of PO flagyl and cefuroxime on discharge.  Not able to obtain stool enteric panel, and fecal calprotectin.  General surgery consult recommending nonsurgical management as patient poor surgical candidate.  GI consult, can consider colonoscopy in the future however not planned at this time.   Without blood BM >48 hours, Hgb stable.  Continue IV fluids.  Advance to dysphagia dental soft lo residue diet with good tolerance.  GI signed off, ok to be discharged.  Hematochezia  Noted to have episodes of bloody diarrhea nursing facility.  Noted to have elevated INR 2.81 not on anticoagulation.  Given reversal in the ED.  Suspect in the setting of proctocolitis versus ischemic colitis.  Surgery/GI following, signed off  Without bloody BMS these past 2 days, Hg stable  Discharged on 2 further days of PO antibiotics.  Acute blood loss anemia  Recent Labs     03/13/25  1831 03/14/25  0004 03/14/25  0456   HGB 9.9* 10.5* 10.0*   In setting of acute GIB. Stabilized.  Hg check q 6h.  Transfuse for <7.  Hyperglycemia  Blood sugar noted to be 320 on BMP  This recent A1c 6.7  Initiated on sliding scale insulin.  Follow up outpatient regarding initiation on chronic medication.  Coronary artery disease involving native coronary artery of native heart with angina pectoris (HCC)  Hold ASA in the setting of GI bleeding  Resume when  able  Continue PTA Lipitor  Benign hypertension with CKD (chronic kidney disease) stage IV (HCC)  Lab Results   Component Value Date    EGFR 53 03/14/2025    EGFR 52 03/13/2025    EGFR 41 03/11/2025    CREATININE 0.98 03/14/2025    CREATININE 0.99 03/13/2025    CREATININE 1.20 03/11/2025   Creatinine stable  Continue PTA antihypertensive regimen  Dyslipidemia  Continue PTA Lipitor and Zetia.  Pancreatic lesion  CT showing subcentimeter pancreatic cystic lesion  Recommend follow-up every 2 years.  Consider patient's age and clinical status to assess need for further follow-up.  Preferred imaging modality is abdomen MRI and MRCP with and without IV contrast ordered triple phase abdomen CT with IV contrast or abdomen MRI and MRCP without IV contrast.  Discussed finding with patients sister Mariposa who feels patient would not want this further worked up.  Lactic acidosis  Lactic 2.3-> 1.9  Resolved with fluids.  Positive blood culture  With + staph epidermidis in 1/2 blood cultures  Suspect contamination, other culture without growth at 48 hours  Is on IV ceftriaxone and flagyl regardless.     Medical Problems       Resolved Problems  Date Reviewed: 3/10/2025   None       Discharging Physician / Practitioner: Cristin Kirby PA-C  PCP: Elly Mojica MD  Admission Date:   Admission Orders (From admission, onward)       Ordered        03/11/25 0409  INPATIENT ADMISSION  Once                          Discharge Date: 03/14/25    Consultations During Hospital Stay:  GI, gen surg, CRC    Procedures Performed:   XR chest 1 view portable  Result Date: 3/11/2025  Impression: No acute cardiopulmonary disease. Workstation performed: [id number]     CT chest w ct abdomen pelvis w wo contrast  Result Date: 3/11/2025  Impression: Marked wall thickening and inflammation of the descending colon, sigmoid colon, and rectum, consistent with proctocolitis. Correlate for ischemia. No high volume GI bleed identified. Subcentimeter pancreatic  cystic lesion.-Management recommendation: Followup every 2 years. Endpoint determined by clinician. Consider patient's age and clinical status to assess need for further follow-up. Preferred imaging modality: abdomen MRI and MRCP  with and without IV contrast, or triple phase abdomen CT with IV contrast, or abdomen MRI and MRCP without IV contrast. Management and follow up recommendations for cystic pancreatic lesions are based on Institutional consensus and international evidence-based Kyoto guidelines for the management of intraductal papillary mucinous neoplasm of the pancreas. Pancreatology 24 (2024) 255-270. The study was marked in EPIC for immediate notification. Workstation performed: [employer]         Significant Findings / Test Results:   See above    Incidental Findings:   None     Test Results Pending at Discharge (will require follow up):   None     Outpatient Tests Requested:  Follow up with GI    Complications:  None    Reason for Admission: GIB    Hospital Course:   Jo Jason is a 84 y.o. female patient who originally presented to the hospital on 3/10/2025 with PMHx CAD, CKD stage V, dementia, hypertension, tremor who presents with hematochezia found to have proctocolitis.  History is limited by patient's dementia.  Spoke with nursing facility and spoke to  Daniel BLANCO.  She reports that patient left at 2210 prior to her shift starting.  Based off the records that were available to her she tells me that the patient had 1 large episode of bloody diarrhea with subsequent nonbloody bilious vomiting.  Patient is typically only alert to self.  She was denying any pain at that time.  Patient just finished course of Augmentin.    Seen by colorectal surgery, GI, general surgery on the hospital.  Patient resolution of of bloody diarrhea as well as in hospital as well as stable hemoglobin.  Determined she is not a good surgical candidate regardless.  We are planning to check stool studies however they were  not collected during hospital stay.  As symptoms resolved, GI signed off.  Patient tolerating regular diet at time of discharge is stable be discharged back to prior facility.    Please see above list of diagnoses and related plan for additional information.     Condition at Discharge: stable    Discharge Day Visit / Exam:   Subjective:  Feeling well today 1 person assist to ambulate to chair  Vitals: Blood Pressure: 123/55 (03/14/25 0938)  Pulse: 69 (03/14/25 0704)  Temperature: 97.9 °F (36.6 °C) (03/14/25 0704)  Temp Source: Oral (03/12/25 2258)  Respirations: 18 (03/14/25 0704)  Height: 5' (152.4 cm) (03/11/25 1500)  Weight - Scale: 43.5 kg (95 lb 14.4 oz) (03/10/25 2232)  SpO2: 98 % (03/14/25 0704)  Physical Exam  Vitals reviewed.   Constitutional:       General: She is not in acute distress.     Appearance: Normal appearance. She is cachectic. She is not toxic-appearing.   HENT:      Head: Normocephalic and atraumatic.   Eyes:      General: No scleral icterus.  Cardiovascular:      Rate and Rhythm: Normal rate and regular rhythm.      Heart sounds: Normal heart sounds. No murmur heard.  Pulmonary:      Effort: Pulmonary effort is normal. No respiratory distress.      Breath sounds: Normal breath sounds. No stridor.   Abdominal:      General: Abdomen is flat. Bowel sounds are normal. There is no distension.      Palpations: Abdomen is soft.      Tenderness: There is no abdominal tenderness.   Musculoskeletal:         General: Normal range of motion.      Cervical back: Normal range of motion.      Right lower leg: No edema.      Left lower leg: No edema.   Skin:     Coloration: Skin is not jaundiced or pale.   Neurological:      Mental Status: She is alert.      Comments: Was ambulating with walker with physical therapy prior to evaluation          Discussion with Family: Updated  (sister) via phone.    Discharge instructions/Information to patient and family:   See after visit summary for  information provided to patient and family.      Provisions for Follow-Up Care:  See after visit summary for information related to follow-up care and any pertinent home health orders.      Mobility at time of Discharge:   Basic Mobility Inpatient Raw Score: 6  JH-HLM Goal: 2: Bed activities/Dependent transfer  JH-HLM Achieved: 2: Bed activities/Dependent transfer  HLM Goal achieved. Continue to encourage appropriate mobility.     Disposition:   Facility    Planned Readmission: No     Discharge Medications:  See after visit summary for reconciled discharge medications provided to patient and/or family.      Administrative Statements   Discharge Statement:  I have spent a total time of 41 minutes in caring for this patient on the day of the visit/encounter. >30 minutes of time was spent on: Diagnostic results, Prognosis, Risks and benefits of tx options, Instructions for management, Counseling / Coordination of care, Documenting in the medical record, Reviewing / ordering tests, medicine, procedures  , and Communicating with other healthcare professionals .    **Please Note: This note may have been constructed using a voice recognition system**

## 2025-03-14 NOTE — PLAN OF CARE
Problem: OCCUPATIONAL THERAPY ADULT  Goal: Performs self-care activities at highest level of function for planned discharge setting.  See evaluation for individualized goals.  Description: Treatment Interventions: ADL retraining, Functional transfer training, Endurance training, Cognitive reorientation, Patient/family training, Equipment evaluation/education, Compensatory technique education, Energy conservation, Activityengagement          See flowsheet documentation for full assessment, interventions and recommendations.   Note: Limitation: Decreased ADL status, Decreased Safe judgement during ADL, Decreased cognition, Decreased endurance, Decreased self-care trans, Decreased high-level ADLs (impaired balance, fxnl mobility, act flora, FM coord, fxnl reach, standing flora, strength, attention to task, direction following, safety awareness, insight, pacing, problem solving, learning new tasks, response time)  Prognosis: Good  Assessment: Pt is a 84 y.o. female seen for OT evaluation s/p admission to [facility] on 3/10/2025 due to rectal bleeding. Diagnosed with Proctocolitis. Personal and env factors supporting pt at time of IE include supportive facility staff to (A) with ADLS and IADLs, accessible home environment, and [facility]. Personal and env factors inhibiting engagement in occupations include advanced age, lifestyle patterns, difficulty completing ADLs, and difficulty completing IADLs. Performance deficits that affect the pt’s occupational performance can be seen above. Due to pt's current functional limitations and medical complications pt is functioning below baseline. Pt would benefit from continued skilled OT treatment in order to maximize safety, independence and overall performance with ADLs, functional mobility, functional transfers, and cognition in order to achieve highest level of function.     Rehab Resource Intensity Level, OT: III (Minimum Resource Intensity) (as long as facility able to continue to provide  (A) with ADLs and functional mobility)

## 2025-03-16 LAB — BACTERIA BLD CULT: NORMAL

## 2025-03-18 NOTE — UTILIZATION REVIEW
NOTIFICATION OF ADMISSION DISCHARGE   This is a Notification of Discharge from [facility]. Please be advised that this patient has been discharge from our facility. Below you will find the admission and discharge date and time including the patient’s disposition.   UTILIZATION REVIEW CONTACT:  Shikha Benson  Utilization   Network Utilization Review Department  Phone: [phone] x carefully listen to the prompts. All voicemails are confidential.  Email: [email or web address]     ADMISSION INFORMATION  PRESENTATION DATE: 3/10/2025 10:28 PM  OBERVATION ADMISSION DATE: N/A  INPATIENT ADMISSION DATE: 3/11/25  4:09 AM   DISCHARGE DATE: 3/14/2025  2:32 PM   [facility]    Network Utilization Review Department  ATTENTION: Please call with any questions or concerns to [phone] and carefully listen to the prompts so that you are directed to the right person. All voicemails are confidential.   For Discharge needs, contact Care Management DC Support Team at [phone] opt. 2  Send all requests for admission clinical reviews, approved or denied determinations and any other requests to dedicated fax number below belonging to the campus where the patient is receiving treatment. List of dedicated fax numbers for the Facilities:  FACILITY NAME UR FAX NUMBER   ADMISSION DENIALS (Administrative/Medical Necessity) [phone]   DISCHARGE SUPPORT TEAM (Health system) [phone]   PARENT CHILD HEALTH (Maternity/NICU/Pediatrics) [phone]   [facility] [phone]   [facility] [phone]   [facility] [phone]   [facility] [phone]   [facility] [phone]   [facility] [phone]   [facility] [phone]   [facility]  [phone]   [facility] [phone]   [facility] [phone]   [facility] [phone]   [facility] [phone]

## 2025-04-25 ENCOUNTER — OFFICE VISIT (OUTPATIENT)
Dept: CARDIOLOGY CLINIC | Facility: CLINIC | Age: 85
End: 2025-04-25
Payer: COMMERCIAL

## 2025-04-25 VITALS — OXYGEN SATURATION: 98 % | HEART RATE: 70 BPM | DIASTOLIC BLOOD PRESSURE: 66 MMHG | SYSTOLIC BLOOD PRESSURE: 110 MMHG

## 2025-04-25 DIAGNOSIS — Z98.890 HISTORY OF BILATERAL CAROTID ENDARTERECTOMY: ICD-10-CM

## 2025-04-25 DIAGNOSIS — N18.4 BENIGN HYPERTENSION WITH CKD (CHRONIC KIDNEY DISEASE) STAGE IV (HCC): ICD-10-CM

## 2025-04-25 DIAGNOSIS — I25.119 CORONARY ARTERY DISEASE INVOLVING NATIVE CORONARY ARTERY OF NATIVE HEART WITH ANGINA PECTORIS (HCC): Primary | ICD-10-CM

## 2025-04-25 DIAGNOSIS — I12.9 BENIGN HYPERTENSION WITH CKD (CHRONIC KIDNEY DISEASE) STAGE IV (HCC): ICD-10-CM

## 2025-04-25 DIAGNOSIS — I77.9 BILATERAL CAROTID ARTERY DISEASE, UNSPECIFIED TYPE (HCC): ICD-10-CM

## 2025-04-25 PROCEDURE — 99214 OFFICE O/P EST MOD 30 MIN: CPT | Performed by: INTERNAL MEDICINE

## 2025-04-25 NOTE — PROGRESS NOTES
Cardiology Follow Up    Jo Jason  1940  [id number]  [facility] [place]  [address]  [address]  [facility] [address] [phone]    No diagnosis found.    There are no diagnoses linked to this encounter.  I had the pleasure of seeing Jo Jason for a follow up visit.     INTERVAL HISTORY: none    History of the presenting illness, Discussion/Summary and My Plan are as follows:::    Jo is a pleasant 84-year-old with hypertension, dyslipidemia, coronary artery disease and coronary artery bypass grafting in 2008, bilateral (Left at the time  of her coronary bypass and subsequently right in [place]) carotid endarterectomy, CKD with previous baseline creatinine around 1.2-1.3, who used to follow at the [facility] office but now a resident of [facility]    She does not have any symptoms.  She is now a resident at [facility] and walks with a walker for a few minutes a day.  Denies any symptoms.  Previously used to walk in her basement while she still lived in [facility] No chest pains or shortness of breath or change in physical capacity.  Weight has also been stable.  No recent cardiac admissions.    Cardiac exam is unremarkable.      Plan:    Hypertension:  Controlled   she has CKD with baseline creatinine around 1.2-1.3,     Dyslipidemia:  Controlled, on a statin and Zetia with good control, no changes at this time    Coronary artery disease and prior CABG x 4-2008--Hallman-lad, SVG-diagonal 1, SVG-OM1, SVG-distal right coronary artery  , clinically stable, continue aspirin 81 mg,  statin    History of bilateral carotid endarterectomy:  Not sure of this shows up on her history but she had carotid endarterectomy bilaterally, 1 at [facility], 1 in [place],   Last US  in September 2024 showing patency of the internal carotid arteries with severe external carotid artery stenosis    Follow-up in 12 months     Latest Reference  Range & Units 03/14/25 04:56   Hemoglobin 11.5 - 15.4 g/dL 10.0 (L)   Hematocrit 34.8 - 46.1 % 30.9 (L)   (L): Data is abnormally low     Latest Reference Range & Units Most Recent   BUN 5 - 25 mg/dL 10  3/14/25 04:56   Creatinine 0.60 - 1.30 mg/dL 0.98  3/14/25 04:56      Latest Reference Range & Units 10/03/23 09:06 04/08/24 09:41   Cholesterol See Comment mg/dL 129 122   Triglycerides See Comment mg/dL 67 69   HDL >=50 mg/dL 54 52   Non-HDL Cholesterol mg/dl 75 70   LDL Calculated 0 - 100 mg/dL 62 56     Patient Active Problem List   Diagnosis    Coronary artery disease involving native coronary artery of native heart with angina pectoris (HCC)    Benign hypertension with CKD (chronic kidney disease) stage IV (HCC)    Dyslipidemia    History of bilateral carotid endarterectomy    Tremor, essential    Protein-calorie malnutrition, unspecified severity (HCC)    Chronic renal disease, stage IV (HCC)    Bilateral carotid artery disease, unspecified type (HCC)    MCI (mild cognitive impairment) with memory loss    Proctocolitis    Hematochezia    Pancreatic lesion    Hyperglycemia    Lactic acidosis    Acute blood loss anemia    Positive blood culture     Past Medical History:   Diagnosis Date    Dyslipidemia     Hypertension     Tremor      Social History     Socioeconomic History    Marital status: Single     Spouse name: Not on file    Number of children: Not on file    Years of education: Not on file    Highest education level: Not on file   Occupational History    Not on file   Tobacco Use    Smoking status: Never     Passive exposure: Past    Smokeless tobacco: Never   Vaping Use    Vaping status: Never Used   Substance and Sexual Activity    Alcohol use: Never    Drug use: Never    Sexual activity: Not on file   Other Topics Concern    Not on file   Social History Narrative    Not on file     Social Drivers of Health     Financial Resource Strain: Low Risk  (4/17/2023)    Overall Financial Resource Strain  (CARDIA)     Difficulty of Paying Living Expenses: Not hard at all   Food Insecurity: No Food Insecurity (3/12/2025)    Nursing - Inadequate Food Risk Classification     Worried About Running Out of Food in the Last Year: Never true     Ran Out of Food in the Last Year: Never true     Ran Out of Food in the Last Year: Never true   Transportation Needs: No Transportation Needs (3/12/2025)    Nursing - Transportation Risk Classification     Lack of Transportation: Not on file     Lack of Transportation: No   Physical Activity: Not on file   Stress: Not on file   Social Connections: Not on file   Intimate Partner Violence: Unknown (3/12/2025)    Nursing IPS     Feels Physically and Emotionally Safe: Not on file     Physically Hurt by Someone: Not on file     Humiliated or Emotionally Abused by Someone: Not on file     Physically Hurt by Someone: No     Hurt or Threatened by Someone: No   Housing Stability: Unknown (3/12/2025)    Nursing: Inadequate Housing Risk Classification     Has Housing: Not on file     Worried About Losing Housing: Not on file     Unable to Get Utilities: Not on file     Unable to Pay for Housing in the Last Year: No     Has Housin      Family History   Problem Relation Age of Onset    Coronary artery disease Mother      Past Surgical History:   Procedure Laterality Date    CARDIAC SURGERY      CAROTID ARTERY ANGIOPLASTY      CATARACT EXTRACTION Bilateral     CLOSED MANIPULATION SHOULDER         Current Outpatient Medications:     acetaminophen (TYLENOL) 325 mg tablet, Take 650 mg by mouth every 4 (four) hours as needed for mild pain, Disp: , Rfl:     amLODIPine (NORVASC) 5 mg tablet, TAKE 1 TABLET BY MOUTH TWICE DAILY, Disp: 180 tablet, Rfl: 0    aspirin (ECOTRIN LOW STRENGTH) 81 mg EC tablet, Take 1 tablet (81 mg total) by mouth daily, Disp: 90 tablet, Rfl: 3    atorvastatin (LIPITOR) 40 mg tablet, Take 1 tablet (40 mg total) by mouth daily, Disp: 90 tablet, Rfl: 1    bisacodyl (FLEET) 10  MG/30ML ENEM, Insert 10 mg into the rectum if needed for constipation On day 4 of no BM, Disp: , Rfl:     clobetasol (TEMOVATE) 0.05 % ointment, Apply topically 2 (two) times a week, Disp: 30 g, Rfl: 0    estradiol (ESTRACE VAGINAL) 0.1 mg/g vaginal cream, Apply a pea sized amount to vulva twice weekly, Disp: 42.5 g, Rfl: 0    ezetimibe (ZETIA) 10 mg tablet, Take 1 tablet (10 mg total) by mouth daily, Disp: 90 tablet, Rfl: 1    losartan (COZAAR) 50 mg tablet, Take 1 tablet (50 mg total) by mouth daily, Disp: 90 tablet, Rfl: 1    magnesium hydroxide (MILK OF MAGNESIA) 400 mg/5 mL oral suspension, Take 30 mL by mouth daily as needed for constipation Give day 2 of no BM, repeat on day 3 of no BM, Disp: , Rfl:     mirtazapine (REMERON) 15 mg tablet, Take 1 tablet (15 mg total) by mouth daily at bedtime, Disp: 90 tablet, Rfl: 1    nitroglycerin (NITROSTAT) 0.4 mg SL tablet, Place 1 tablet (0.4 mg total) under the tongue every 5 (five) minutes as needed for chest pain, Disp: 100 tablet, Rfl: 1    pantoprazole (PROTONIX) 40 mg tablet, Take 1 tablet (40 mg total) by mouth daily in the early morning, Disp: 30 tablet, Rfl: 0    polyethylene glycol (MIRALAX) 17 g packet, Take 17 g by mouth if needed (constipation) Give day 2 of no BM, repeat day 3 of no BM, Disp: , Rfl:     senna (SENOKOT) 8.6 MG tablet, Take 1 tablet by mouth if needed for constipation (Give day 2 of no BM, repeat day 3 of no BM), Disp: , Rfl:   No Known Allergies    Imaging: No results found.    Review of Systems:  Review of Systems   Constitutional: Negative.    HENT: Negative.     Eyes: Negative.    Respiratory: Negative.     Cardiovascular: Negative.    Gastrointestinal: Negative.    Endocrine: Negative.    Musculoskeletal: Negative.        Physical Exam:  /66 (BP Location: Right arm, Patient Position: Sitting, Cuff Size: Standard)   Pulse 70   SpO2 98%   Physical Exam  Constitutional:       General: She is not in acute distress.     Appearance:  "Normal appearance. She is not ill-appearing.   HENT:      Nose: Nose normal. No congestion or rhinorrhea.      Mouth/Throat:      Mouth: Mucous membranes are moist.      Pharynx: Oropharynx is clear. No oropharyngeal exudate or posterior oropharyngeal erythema.   Neck:      Comments: Right 1 plus, left 2 plus carotid impulse  Cardiovascular:      Rate and Rhythm: Normal rate and regular rhythm.      Heart sounds: No murmur heard.     No friction rub.   Pulmonary:      Effort: Pulmonary effort is normal. No respiratory distress.      Breath sounds: No stridor. No wheezing or rhonchi.   Musculoskeletal:         General: No swelling, deformity or signs of injury. Normal range of motion.      Cervical back: Normal range of motion. No rigidity or tenderness.   Neurological:      Mental Status: She is alert.         This note was completed in part utilizing [employer] direct voice recognition software.   Grammatical errors, random word insertion, spelling mistakes, occasional wrong word or \"sound-alike\" substitutions and incomplete sentences may be an occasional consequence of the system secondary to software limitations, ambient noise and hardware issues. At the time of dictation, efforts were made to edit, clarify and /or correct errors.  Please read the chart carefully and recognize, using context, where substitutions have occurred.  If you have any questions or concerns about the context, text or information contained within the body of this dictation, please contact myself, the provider, for further clarification.  "

## 2025-06-21 ENCOUNTER — APPOINTMENT (EMERGENCY)
Dept: CT IMAGING | Facility: HOSPITAL | Age: 85
End: 2025-06-21
Payer: COMMERCIAL

## 2025-06-21 ENCOUNTER — APPOINTMENT (EMERGENCY)
Dept: RADIOLOGY | Facility: HOSPITAL | Age: 85
End: 2025-06-21
Payer: COMMERCIAL

## 2025-06-21 ENCOUNTER — HOSPITAL ENCOUNTER (EMERGENCY)
Facility: HOSPITAL | Age: 85
Discharge: HOME | End: 2025-06-21
Attending: STUDENT IN AN ORGANIZED HEALTH CARE EDUCATION/TRAINING PROGRAM | Admitting: EMERGENCY MEDICINE
Payer: COMMERCIAL

## 2025-06-21 VITALS
BODY MASS INDEX: 18.47 KG/M2 | TEMPERATURE: 97.8 F | RESPIRATION RATE: 16 BRPM | DIASTOLIC BLOOD PRESSURE: 70 MMHG | SYSTOLIC BLOOD PRESSURE: 171 MMHG | WEIGHT: 94.58 LBS | OXYGEN SATURATION: 98 % | HEART RATE: 78 BPM
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
## 2025-06-21 DIAGNOSIS — S51.012A SKIN TEAR OF LEFT ELBOW WITHOUT COMPLICATION, INITIAL ENCOUNTER: ICD-10-CM

## 2025-06-21 PROCEDURE — 99284 EMERGENCY DEPT VISIT MOD MDM: CPT | Performed by: STUDENT IN AN ORGANIZED HEALTH CARE EDUCATION/TRAINING PROGRAM

## 2025-06-21 PROCEDURE — 90715 TDAP VACCINE 7 YRS/> IM: CPT

## 2025-06-21 PROCEDURE — 70450 CT HEAD/BRAIN W/O DYE: CPT

## 2025-06-21 PROCEDURE — 72131 CT LUMBAR SPINE W/O DYE: CPT

## 2025-06-21 PROCEDURE — 72125 CT NECK SPINE W/O DYE: CPT

## 2025-06-21 PROCEDURE — 90471 IMMUNIZATION ADMIN: CPT

## 2025-06-21 PROCEDURE — 99284 EMERGENCY DEPT VISIT MOD MDM: CPT

## 2025-06-21 PROCEDURE — 73080 X-RAY EXAM OF ELBOW: CPT

## 2025-06-21 RX ADMIN — TETANUS TOXOID, REDUCED DIPHTHERIA TOXOID AND ACELLULAR PERTUSSIS VACCINE, ADSORBED 0.5 ML: 5; 2.5; 8; 8; 2.5 SUSPENSION INTRAMUSCULAR at 12:05

## 2025-06-21 NOTE — DISCHARGE INSTRUCTIONS
Dear Jo Jason,     It was our pleasure to care for you here at [facility]. It was an honor and privilege to be part of your health care team. Please review this entire after visit summary and read your personalized discharge instructions below:  Please follow up with your PCP within 1 week or as soon as possible.  Please take Tylenol and Ibuprofen as needed..  Please return to the ER if your symptoms worsen or fail to improve, if you experience passing out, falling, or if you experience anything concerning to you.    Sincerely,     Billy Garvey MD

## 2025-06-21 NOTE — ED PROVIDER NOTES
Emergency Department Trauma Note  Jo Jason 84 y.o. female MRN: [id number]  Unit/Bed#: ED-09/ED-09 Encounter: [id number]      Trauma Alert: Trauma Acuity: C  Model of Arrival: Mode of Arrival: BLS via    Trauma Team: Current Providers  Attending Provider: Yamileth Butler MD  Attending Provider: Festus Black DO  Registered Nurse: Gilbert Martinez, YANELY  Resident: Billy Garvey MD  Registered Nurse: Renee Jeronimo RN  Consultants:     None      History of Present Illness     Chief Complaint:   Chief Complaint   Patient presents with    Fall       Mechanism:Details of Incident: +headstrike +aspirin          Jo Jason is a 84 y.o. female     They presented to the emergency department on June 21, 2025. Patient presents with:  Fall  .    The patient states that she slipped on water and fell.  EMS confirms the story and states it was a witnessed fall.  She did hit her head.  She takes aspirin.  She is currently complaining of pain everywhere and is not able to localize anything.  She has a history of dementia and is oriented to person and place at baseline, GCS 14 at baseline.  She is currently oriented to person place and situation but not year. Patient denies chest pain, shortness of breath, headache, lightheadedness, dizziness, fevers, chills, nausea, vomiting, diarrhea, hematuria, dysuria, or any other complaint at this time.            History provided by:  Patient    Review of Systems   Constitutional:  Negative for chills, diaphoresis and fever.   HENT:  Negative for congestion, ear pain, postnasal drip, rhinorrhea and sore throat.    Eyes:  Negative for pain and visual disturbance.   Respiratory:  Negative for cough, chest tightness and shortness of breath.    Cardiovascular:  Negative for chest pain and palpitations.   Gastrointestinal:  Negative for abdominal pain, constipation, diarrhea, nausea and vomiting.   Genitourinary:  Negative for dysuria and hematuria.   Musculoskeletal:  Negative for  arthralgias and back pain.   Skin:  Negative for color change and rash.   Neurological:  Negative for dizziness, seizures, syncope, weakness, light-headedness, numbness and headaches.   All other systems reviewed and are negative.      Historical Information     Immunizations:   Immunization History   Administered Date(s) Administered    COVID-19 PFIZER VACCINE 0.3 ML IM 03/06/2021, 03/25/2021, 11/13/2021, 10/04/2023    INFLUENZA 10/04/2023    Tdap 06/21/2025       Past Medical History[1]  Family History[2]  Past Surgical History[3]  Social History[4]  E-Cigarette/Vaping    E-Cigarette Use Never User      E-Cigarette/Vaping Substances       Family History: non-contributory    Meds/Allergies   Prior to Admission Medications   Prescriptions Last Dose Informant Patient Reported? Taking?   acetaminophen (TYLENOL) 325 mg tablet  Outside Facility (Specify) Yes No   Sig: Take 650 mg by mouth every 4 (four) hours as needed for mild pain   amLODIPine (NORVASC) 5 mg tablet   No No   Sig: TAKE 1 TABLET BY MOUTH TWICE DAILY   aspirin (ECOTRIN LOW STRENGTH) 81 mg EC tablet  Outside Facility (Specify) No No   Sig: Take 1 tablet (81 mg total) by mouth daily   atorvastatin (LIPITOR) 40 mg tablet  Outside Facility (Specify) No No   Sig: Take 1 tablet (40 mg total) by mouth daily   bisacodyl (FLEET) 10 MG/30ML ENEM  Outside Facility (Specify) Yes No   Sig: Insert 10 mg into the rectum if needed for constipation On day 4 of no BM   clobetasol (TEMOVATE) 0.05 % ointment  Outside Facility (Specify) No No   Sig: Apply topically 2 (two) times a week   estradiol (ESTRACE VAGINAL) 0.1 mg/g vaginal cream  Outside Facility (Specify) No No   Sig: Apply a pea sized amount to vulva twice weekly   ezetimibe (ZETIA) 10 mg tablet  Outside Facility (Specify) No No   Sig: Take 1 tablet (10 mg total) by mouth daily   losartan (COZAAR) 50 mg tablet  Outside Facility (Specify) No No   Sig: Take 1 tablet (50 mg total) by mouth daily   magnesium hydroxide  (MILK OF MAGNESIA) 400 mg/5 mL oral suspension  Outside Facility (Specify) Yes No   Sig: Take 30 mL by mouth daily as needed for constipation Give day 2 of no BM, repeat on day 3 of no BM   mirtazapine (REMERON) 15 mg tablet  Outside Facility (Specify) No No   Sig: Take 1 tablet (15 mg total) by mouth daily at bedtime   nitroglycerin (NITROSTAT) 0.4 mg SL tablet  Outside Facility (Specify) No No   Sig: Place 1 tablet (0.4 mg total) under the tongue every 5 (five) minutes as needed for chest pain   pantoprazole (PROTONIX) 40 mg tablet   No No   Sig: Take 1 tablet (40 mg total) by mouth daily in the early morning   polyethylene glycol (MIRALAX) 17 g packet  Outside Facility (Specify) Yes No   Sig: Take 17 g by mouth if needed (constipation) Give day 2 of no BM, repeat day 3 of no BM   senna (SENOKOT) 8.6 MG tablet  Outside Facility (Specify) Yes No   Sig: Take 1 tablet by mouth if needed for constipation (Give day 2 of no BM, repeat day 3 of no BM)      Facility-Administered Medications: None       Allergies[5]    PHYSICAL EXAM        Objective   Vitals:   First set: Temperature: 97.8 °F (36.6 °C) (06/21/25 1053)  Pulse: 74 (06/21/25 1053)  Respirations: 18 (06/21/25 1053)  Blood Pressure: 168/83 (06/21/25 1053)  SpO2: 97 % (06/21/25 1053)    Primary Survey:   (A) Airway: Intact  (B) Breathing: Bilateral breath sounds  (C) Circulation: Pulses:   normal  (D) Disabliity:  GCS Total:  14  (E) Expose:  Completed    Secondary Survey: (Click on Physical Exam tab above)  Physical Exam  Constitutional:       General: She is not in acute distress.     Appearance: She is not diaphoretic.   HENT:      Head: Normocephalic and atraumatic.      Nose: No congestion or rhinorrhea.      Mouth/Throat:      Mouth: Mucous membranes are moist.      Pharynx: No oropharyngeal exudate.     Eyes:      General: No scleral icterus.      Cardiovascular:      Rate and Rhythm: Normal rate and regular rhythm.      Heart sounds: Normal heart sounds.  No murmur heard.     No friction rub. No gallop.   Pulmonary:      Effort: No respiratory distress.      Breath sounds: Normal breath sounds. No wheezing, rhonchi or rales.   Abdominal:      General: Abdomen is flat. There is no distension.      Palpations: Abdomen is soft.      Tenderness: There is no abdominal tenderness. There is no guarding.   Lymphadenopathy:      Cervical: No cervical adenopathy.     Skin:     General: Skin is warm and dry.      Capillary Refill: Capillary refill takes less than 2 seconds.      Findings: Wound present. No rash.      Comments: 0.7 cm skin tear to the left elbow     Neurological:      General: No focal deficit present.      Mental Status: She is alert and oriented to person, place, and time.         Cervical spine cleared by clinical criteria? No (imaging required)      Invasive Devices       None                   Lab Results:   Results Reviewed       None                   Imaging Studies:   Direct to CT: No  XR elbow 3+ views LEFT   ED Interpretation by Billy Garvey MD (06/21 1315)   No acute fracture or bony abnormality.  Independently interpreted by myself.      TRAUMA - CT head wo contrast   Final Result by Ashley Kline MD (06/21 1212)   No acute intracranial abnormality.   Chronic intracranial findings, as above.                  Workstation performed: [id number]         TRAUMA - CT spine cervical wo contrast   Final Result by Merna Kaiser MD (06/21 2117)      No acute compression collapse of the vertebra      Multilevel degenerative disc disease with central canal narrowing at C5-6, C6/7                  Workstation performed: [id number]         TRAUMA - CT spine lumbar wo contrast   Final Result by Merna Kaiser MD (06/21 1597)      No acute compression collapse of the vertebra      Multilevel degenerative disc disease with severe central canal stenosis at L2-3, L3-4 and L4-5 with multilevel foraminal narrowing      Incompletely assessed large hiatal hernia  with distended stomach      Workstation performed: [id number]               Procedures  Procedures         ED Course           Medical Decision Making  Patient seen and examined. Physical exam is notable for 0.7 cm skin tear on the left elbow. Remainder of exam is within normal limits.    Differential: Fall, traumatic head injury    Appropriate imaging ordered.     Imaging shows no acute abnormalities. This supports a diagnosis of fall. All results discussed with patient, they express understanding.     Patient is agreeable to discharge home with follow-up with PCP. All questions answered and return precautions discussed.       Amount and/or Complexity of Data Reviewed  Radiology: ordered and independent interpretation performed.    Risk  Prescription drug management.                Disposition  [facility] Transfer: No  Final diagnoses:   Fall, initial encounter   Skin tear of left elbow without complication, initial encounter     Time reflects when diagnosis was documented in both MDM as applicable and the Disposition within this note       Time User Action Codes Description Comment    6/21/2025  1:15 PM Billy Garvey Add [W19.XXXA] Fall, initial encounter     6/21/2025  1:15 PM Billy Garvey Add [S51.012A] Skin tear of left elbow without complication, initial encounter           ED Disposition       ED Disposition   Discharge    Condition   Stable    Date/Time   Sat Jun 21, 2025  1:15 PM    Comment   Jo Jason discharge to home/self care.                   Follow-up Information       Follow up With Specialties Details Why Contact Info    Elly Mojica MD Internal Medicine In 1 week  [address] [facility]  [address] [place] [address]  [phone]            Discharge Medication List as of 6/21/2025  1:16 PM        CONTINUE these medications which have NOT CHANGED    Details   acetaminophen (TYLENOL) 325 mg tablet Take 650 mg by mouth every 4 (four) hours as needed for mild pain, Historical Med       amLODIPine (NORVASC) 5 mg tablet TAKE 1 TABLET BY MOUTH TWICE DAILY, Starting Wed 10/23/2024, Normal      aspirin (ECOTRIN LOW STRENGTH) 81 mg EC tablet Take 1 tablet (81 mg total) by mouth daily, Starting Tue 11/8/2022, Normal      atorvastatin (LIPITOR) 40 mg tablet Take 1 tablet (40 mg total) by mouth daily, Starting Mon 7/29/2024, Normal      bisacodyl (FLEET) 10 MG/30ML ENEM Insert 10 mg into the rectum if needed for constipation On day 4 of no BM, Historical Med      clobetasol (TEMOVATE) 0.05 % ointment Apply topically 2 (two) times a week, Starting Thu 6/8/2023, Normal      estradiol (ESTRACE VAGINAL) 0.1 mg/g vaginal cream Apply a pea sized amount to vulva twice weekly, Normal      ezetimibe (ZETIA) 10 mg tablet Take 1 tablet (10 mg total) by mouth daily, Starting Mon 7/29/2024, Normal      losartan (COZAAR) 50 mg tablet Take 1 tablet (50 mg total) by mouth daily, Starting Mon 7/29/2024, Normal      magnesium hydroxide (MILK OF MAGNESIA) 400 mg/5 mL oral suspension Take 30 mL by mouth daily as needed for constipation Give day 2 of no BM, repeat on day 3 of no BM, Historical Med      mirtazapine (REMERON) 15 mg tablet Take 1 tablet (15 mg total) by mouth daily at bedtime, Starting Mon 7/29/2024, Normal      nitroglycerin (NITROSTAT) 0.4 mg SL tablet Place 1 tablet (0.4 mg total) under the tongue every 5 (five) minutes as needed for chest pain, Starting Tue 2/15/2022, Normal      pantoprazole (PROTONIX) 40 mg tablet Take 1 tablet (40 mg total) by mouth daily in the early morning, Starting Sat 3/15/2025, No Print      polyethylene glycol (MIRALAX) 17 g packet Take 17 g by mouth if needed (constipation) Give day 2 of no BM, repeat day 3 of no BM, Historical Med      senna (SENOKOT) 8.6 MG tablet Take 1 tablet by mouth if needed for constipation (Give day 2 of no BM, repeat day 3 of no BM), Historical Med           No discharge procedures on file.    PDMP Review         Value Time User    PDMP Reviewed  Yes  3/10/2025 10:37 PM Radhames Adkins MD            ED Provider  Electronically Signed by             [1]   Past Medical History:  Diagnosis Date    Dyslipidemia     Hypertension     Tremor    [2]   Family History  Problem Relation Name Age of Onset    Coronary artery disease Mother     [3]   Past Surgical History:  Procedure Laterality Date    CARDIAC SURGERY      CAROTID ARTERY ANGIOPLASTY      CATARACT EXTRACTION Bilateral     CLOSED MANIPULATION SHOULDER     [4]   Social History  Tobacco Use    Smoking status: Never     Passive exposure: Past    Smokeless tobacco: Never   Vaping Use    Vaping status: Never Used   Substance Use Topics    Alcohol use: Never    Drug use: Never   [5] No Known Allergies       Billy Garvey MD  06/21/25 2762

## 2025-06-22 NOTE — ED ATTENDING ATTESTATION
I, Yamileth Butler MD, saw and evaluated the patient. I have discussed the patient with the resident/non-physician practitioner and agree with the resident's/non-physician practitioner's findings, Plan of Care, and MDM as documented in the resident's/non-physician practitioner's note, except where noted. All available labs and Radiology studies were reviewed.  I was present for key portions of any procedure(s) performed by the resident/non-physician practitioner and I was immediately available to provide assistance.       At this point I agree with the current assessment done in the Emergency Department.  I have conducted an independent evaluation of this patient a history and physical is as follows:    Subjective: 84-year-old female with history of dementia presenting from assisted living facility after witnessed slip and fall with head strike without LOC but on aspirin.  She reports entire body pain without numbness/tingling/weakness, chest pain, shortness of breath, abdominal pain, nausea/vomiting, headache, or any other complaints.    Objective: Vitals with hypertension but otherwise stable and afebrile.  No Ya sign, maxillary instability, bloody/clear rhinorrhea, racoon eyes, or other evidence of head trauma.  Lungs clear to auscultation with normal heart sounds and soft nontender abdomen.  Contusion to left elbow.  Mild midline tenderness to palpation of lumbar spine without step-offs/deformities.  Patient briskly moving all 4 extremities with sensation intact to light touch throughout.    Assessment/Plan: Elderly female on aspirin presenting status post witnessed mechanical ground-level fall with head strike without LOC.  Primary survey intact, GCS 14 for confused speech which is at baseline for patient, briskly moving all 4 extremities.  Secondary survey with contusion to the left elbow but otherwise no external stigmata of trauma and neurovascularly intact throughout all 4 extremities.  CT imaging of head,  C-spine, and L-spine without acute abnormalities.  Plain films of the elbow negative for acute abnormality.    Patient discharged back to her assisted living facility in stable condition.

## (undated) RX ORDER — HYDROXYZINE HYDROCHLORIDE 25 MG/1: 1 TABLET, FILM COATED ORAL

## (undated) RX ORDER — LOTEPREDNOL ETABONATE 3.8 MG/G: 1 GEL OPHTHALMIC